# Patient Record
Sex: MALE | Race: BLACK OR AFRICAN AMERICAN | NOT HISPANIC OR LATINO | Employment: FULL TIME | URBAN - METROPOLITAN AREA
[De-identification: names, ages, dates, MRNs, and addresses within clinical notes are randomized per-mention and may not be internally consistent; named-entity substitution may affect disease eponyms.]

---

## 2018-04-23 ENCOUNTER — HOSPITAL ENCOUNTER (EMERGENCY)
Facility: HOSPITAL | Age: 23
Discharge: HOME | End: 2018-04-23
Attending: EMERGENCY MEDICINE
Payer: COMMERCIAL

## 2018-04-23 VITALS
BODY MASS INDEX: 28.77 KG/M2 | HEART RATE: 65 BPM | WEIGHT: 201 LBS | OXYGEN SATURATION: 96 % | SYSTOLIC BLOOD PRESSURE: 126 MMHG | DIASTOLIC BLOOD PRESSURE: 72 MMHG | RESPIRATION RATE: 20 BRPM | TEMPERATURE: 98 F | HEIGHT: 70 IN

## 2018-04-23 DIAGNOSIS — J02.9 PHARYNGITIS, UNSPECIFIED ETIOLOGY: ICD-10-CM

## 2018-04-23 DIAGNOSIS — R09.81 SINUS CONGESTION: Primary | ICD-10-CM

## 2018-04-23 LAB — DEPRECATED S PYO AG THROAT QL EIA: NEGATIVE

## 2018-04-23 PROCEDURE — 99284 EMERGENCY DEPT VISIT MOD MDM: CPT | Mod: 25

## 2018-04-23 PROCEDURE — 87880 STREP A ASSAY W/OPTIC: CPT

## 2018-04-23 PROCEDURE — 63600175 PHARM REV CODE 636 W HCPCS: Performed by: PHYSICIAN ASSISTANT

## 2018-04-23 PROCEDURE — 87081 CULTURE SCREEN ONLY: CPT

## 2018-04-23 PROCEDURE — 25000003 PHARM REV CODE 250: Performed by: PHYSICIAN ASSISTANT

## 2018-04-23 PROCEDURE — 96372 THER/PROPH/DIAG INJ SC/IM: CPT

## 2018-04-23 RX ORDER — KETOROLAC TROMETHAMINE 30 MG/ML
15 INJECTION, SOLUTION INTRAMUSCULAR; INTRAVENOUS
Status: COMPLETED | OUTPATIENT
Start: 2018-04-23 | End: 2018-04-23

## 2018-04-23 RX ORDER — IBUPROFEN 600 MG/1
600 TABLET ORAL EVERY 6 HOURS PRN
Qty: 20 TABLET | Refills: 0 | Status: SHIPPED | OUTPATIENT
Start: 2018-04-23 | End: 2019-08-26 | Stop reason: ALTCHOICE

## 2018-04-23 RX ORDER — BENZONATATE 100 MG/1
100 CAPSULE ORAL 3 TIMES DAILY PRN
Qty: 20 CAPSULE | Refills: 0 | Status: SHIPPED | OUTPATIENT
Start: 2018-04-23 | End: 2018-05-03

## 2018-04-23 RX ORDER — ACETAMINOPHEN 325 MG/1
325 TABLET ORAL EVERY 6 HOURS PRN
COMMUNITY
End: 2023-02-09

## 2018-04-23 RX ORDER — FLUTICASONE PROPIONATE 50 MCG
1 SPRAY, SUSPENSION (ML) NASAL 2 TIMES DAILY PRN
Qty: 15 G | Refills: 0 | Status: SHIPPED | OUTPATIENT
Start: 2018-04-23 | End: 2022-03-21

## 2018-04-23 RX ORDER — BENZONATATE 100 MG/1
100 CAPSULE ORAL
Status: COMPLETED | OUTPATIENT
Start: 2018-04-23 | End: 2018-04-23

## 2018-04-23 RX ADMIN — BENZONATATE 100 MG: 100 CAPSULE ORAL at 06:04

## 2018-04-23 RX ADMIN — KETOROLAC TROMETHAMINE 15 MG: 30 INJECTION, SOLUTION INTRAMUSCULAR at 06:04

## 2018-04-25 LAB — BACTERIA THROAT CULT: NORMAL

## 2018-12-07 ENCOUNTER — HOSPITAL ENCOUNTER (EMERGENCY)
Facility: HOSPITAL | Age: 23
Discharge: HOME | End: 2018-12-07
Attending: EMERGENCY MEDICINE
Payer: COMMERCIAL

## 2018-12-07 VITALS
DIASTOLIC BLOOD PRESSURE: 96 MMHG | BODY MASS INDEX: 29.4 KG/M2 | HEART RATE: 68 BPM | SYSTOLIC BLOOD PRESSURE: 153 MMHG | HEIGHT: 71 IN | RESPIRATION RATE: 20 BRPM | OXYGEN SATURATION: 97 % | WEIGHT: 210 LBS | TEMPERATURE: 98 F

## 2018-12-07 DIAGNOSIS — R09.89 ABNORMAL LUNG SOUNDS: ICD-10-CM

## 2018-12-07 DIAGNOSIS — J06.9 URI WITH COUGH AND CONGESTION: Primary | ICD-10-CM

## 2018-12-07 DIAGNOSIS — S02.5XXA CLOSED FRACTURE OF TOOTH, INITIAL ENCOUNTER: ICD-10-CM

## 2018-12-07 LAB
FLUAV AG SPEC QL IA: NEGATIVE
FLUBV AG SPEC QL IA: NEGATIVE
SPECIMEN SOURCE: NORMAL

## 2018-12-07 PROCEDURE — 25000003 PHARM REV CODE 250: Performed by: NURSE PRACTITIONER

## 2018-12-07 PROCEDURE — 25000242 PHARM REV CODE 250 ALT 637 W/ HCPCS: Performed by: NURSE PRACTITIONER

## 2018-12-07 PROCEDURE — 94760 N-INVAS EAR/PLS OXIMETRY 1: CPT

## 2018-12-07 PROCEDURE — 94640 AIRWAY INHALATION TREATMENT: CPT

## 2018-12-07 PROCEDURE — 99284 EMERGENCY DEPT VISIT MOD MDM: CPT | Mod: 25

## 2018-12-07 PROCEDURE — 87400 INFLUENZA A/B EACH AG IA: CPT

## 2018-12-07 RX ORDER — PENICILLIN V POTASSIUM 250 MG/1
250 TABLET, FILM COATED ORAL EVERY 6 HOURS
Qty: 28 TABLET | Refills: 0 | Status: SHIPPED | OUTPATIENT
Start: 2018-12-07 | End: 2018-12-14

## 2018-12-07 RX ORDER — PENICILLIN V POTASSIUM 250 MG/1
250 TABLET, FILM COATED ORAL
Status: COMPLETED | OUTPATIENT
Start: 2018-12-07 | End: 2018-12-07

## 2018-12-07 RX ORDER — ALBUTEROL SULFATE 90 UG/1
1-2 AEROSOL, METERED RESPIRATORY (INHALATION) EVERY 6 HOURS PRN
Qty: 1 INHALER | Refills: 0 | Status: SHIPPED | OUTPATIENT
Start: 2018-12-07 | End: 2022-03-21 | Stop reason: ALTCHOICE

## 2018-12-07 RX ORDER — SULINDAC 150 MG/1
150 TABLET ORAL 2 TIMES DAILY
Qty: 10 TABLET | Refills: 0 | Status: SHIPPED | OUTPATIENT
Start: 2018-12-07 | End: 2022-03-21

## 2018-12-07 RX ORDER — NAPROXEN 250 MG/1
250 TABLET ORAL
Status: COMPLETED | OUTPATIENT
Start: 2018-12-07 | End: 2018-12-07

## 2018-12-07 RX ORDER — ALBUTEROL SULFATE 2.5 MG/.5ML
2.5 SOLUTION RESPIRATORY (INHALATION)
Status: COMPLETED | OUTPATIENT
Start: 2018-12-07 | End: 2018-12-07

## 2018-12-07 RX ADMIN — NAPROXEN 250 MG: 250 TABLET ORAL at 05:12

## 2018-12-07 RX ADMIN — PENICILLIN V POTASIUM 250 MG: 250 TABLET ORAL at 05:12

## 2018-12-07 RX ADMIN — ALBUTEROL SULFATE 2.5 MG: 2.5 SOLUTION RESPIRATORY (INHALATION) at 06:12

## 2018-12-07 NOTE — ED TRIAGE NOTES
Patient reports body aches, runny nose, headache, sore throat, cough with yellow sputum, fever, vomiting x 2 days. Also reports right lower dental pain. Reports taking Ibuprofen and Theraflu for the symptoms with minimal relief.

## 2018-12-08 NOTE — ED PROVIDER NOTES
Encounter Date: 12/7/2018       History     Chief Complaint   Patient presents with    Generalized Body Aches     body aches, runny nose, headache, sore throat, cough - yellow, fever, and vomiting x 2 days.    Dental Pain     right bottom cracked tooth x 3 days.     Chief complaint:  Myalgias, dental pain    History of present illness:  Patient is a 23-year-old male who presents with 2 days of upper respiratory symptoms as well as a fractured right molar.  He reports he has tried there fluid ibuprofen without effectiveness.  Currently reports 7/10 pain scale.  Reports tooth was fracture 3 days ago while eating.  Reports the distant history of asthma.      The history is provided by the patient and medical records. No  was used.     Review of patient's allergies indicates:  No Known Allergies  History reviewed. No pertinent past medical history.  Past Surgical History:   Procedure Laterality Date    ACHILLES TENDON SURGERY       Family History   Family history unknown: Yes     Social History     Tobacco Use    Smoking status: Current Some Day Smoker    Smokeless tobacco: Never Used    Tobacco comment: Cigar   Substance Use Topics    Alcohol use: Yes     Comment: occasionally    Drug use: No     Review of Systems   Constitutional: Negative for appetite change, chills, diaphoresis, fatigue and fever.   HENT: Positive for dental problem, rhinorrhea and sore throat. Negative for congestion, ear discharge, ear pain, postnasal drip, sinus pressure, sneezing and voice change.    Eyes: Negative for discharge, itching and visual disturbance.   Respiratory: Positive for cough. Negative for shortness of breath and wheezing.    Cardiovascular: Negative for chest pain, palpitations and leg swelling.   Gastrointestinal: Positive for nausea and vomiting. Negative for abdominal pain.   Endocrine: Negative for polydipsia, polyphagia and polyuria.   Genitourinary: Negative for difficulty urinating,  discharge, dysuria, frequency, hematuria, penile pain, penile swelling and urgency.   Musculoskeletal: Positive for myalgias. Negative for arthralgias.   Skin: Negative for rash and wound.   Neurological: Positive for headaches. Negative for dizziness, seizures, syncope and weakness.   Hematological: Negative for adenopathy. Does not bruise/bleed easily.   Psychiatric/Behavioral: Negative for agitation and self-injury. The patient is not nervous/anxious.        Physical Exam     Initial Vitals [12/07/18 1715]   BP Pulse Resp Temp SpO2   (!) 140/90 76 18 98.4 °F (36.9 °C) 97 %      MAP       --         Physical Exam    Nursing note and vitals reviewed.  Constitutional: He appears well-developed and well-nourished. He is not diaphoretic. No distress. He is not intubated.   HENT:   Head: Normocephalic and atraumatic.   Right Ear: External ear normal.   Left Ear: External ear normal.   Nose: Nose normal. No epistaxis.   Mouth/Throat: Oropharynx is clear and moist. Abnormal dentition.       Eyes: Pupils are equal, round, and reactive to light. Right eye exhibits no discharge. Left eye exhibits no discharge. No scleral icterus.   Neck: Normal range of motion.   Cardiovascular: Regular rhythm, S1 normal, S2 normal and normal heart sounds. Exam reveals no gallop.    No murmur heard.  Pulmonary/Chest: Effort normal and breath sounds normal. No accessory muscle usage. No apnea, no tachypnea and no bradypnea. He is not intubated. No respiratory distress. He has no decreased breath sounds. He has no wheezes. He has no rhonchi. He has no rales.   Abdominal: He exhibits no distension.   Musculoskeletal: Normal range of motion.   Neurological: He is alert and oriented to person, place, and time.   Skin: Skin is dry. Capillary refill takes less than 2 seconds.         ED Course   Procedures  Labs Reviewed   INFLUENZA A AND B ANTIGEN          Imaging Results          X-Ray Chest PA And Lateral (Final result)  Result time 12/07/18  18:02:20    Final result by Aria Bouderaux MD (12/07/18 18:02:20)                 Impression:      No acute intrathoracic process identified.      Electronically signed by: Aria Boudreaux MD  Date:    12/07/2018  Time:    18:02             Narrative:    EXAMINATION:  XR CHEST PA AND LATERAL    CLINICAL HISTORY:  Other specified symptoms and signs involving the circulatory and respiratory systems    TECHNIQUE:  PA and lateral views of the chest were performed.    COMPARISON:  None    FINDINGS:  Cardiac silhouette is normal in size.  Lungs are symmetrically expanded.  No evidence of focal consolidative process, pneumothorax, or significant effusion.  No acute osseous abnormality identified.                                       APC / Resident Notes:   Initial assessment:  23-year-old male with dental pain secondary to a dental fracture as well as upper respiratory infection symptoms.    Differential diagnosis includes asthma exacerbation, influenza, URI, dental fracture    I will give Pen-VK both now on discharge for dental fracture as well as an anti-inflammatory such as Clinoril 150 mg p.o. b.i.d. after reviewing renal status allergy listed medications.  Patient was given 2.5 mg of albuterol inhaled.  Naproxen 250 mg given in the emergency department for relief of myalgias.    Chest x-ray indicated no abnormalities.    Patient was discharged home on Pen-VK for dental infection and told to follow up with his dentist as soon as possible.  He is also prescribed an albuterol MDI for use every 4-6 hours p.r.n. wheezing; Clinoril 100 mg p.o. b.i.d. for relief of myalgias and body aches.  He is recommended to use Flonase for rhinorrhea, Cepacol for sore throat, Delsym for cough.  Follow up with primary care provider for upper respiratory infection symptoms.    Return to the emergency department for any worsening or changes in condition.              ED Course as of Dec 07 1857   Fri Dec 07, 2018   1811   No acute  intrathoracic process identified. X-Ray Chest PA And Lateral [VC]   1812 BP: (!) 140/90 [VC]   1812 Temp: 98.4 °F (36.9 °C) [VC]   1812 Pulse: 76 [VC]   1812 Resp: 18 [VC]   1812 SpO2: 97 % [VC]   1817 Influenza A Ag, EIA: Negative [VC]   1817 Influenza B Ag, EIA: Negative [VC]      ED Course User Index  [VC] Sanju Guthrie DNP     Clinical Impression:   The primary encounter diagnosis was URI with cough and congestion. Diagnoses of Abnormal lung sounds and Closed fracture of tooth, initial encounter were also pertinent to this visit.      Disposition:   Disposition: Discharged  Condition: Stable                        Sanju Guthrie DNP  12/07/18 1901

## 2018-12-08 NOTE — DISCHARGE INSTRUCTIONS
Flonase for congestion and runny nose. Cepacol lozenges, warm fluids to drink, and warm salt water gargles for sore throat. Delsym over the counter for cough. Return to the Emergency department for any worsening or failure to improve, otherwise follow up with your primary care provider.

## 2019-03-01 ENCOUNTER — HOSPITAL ENCOUNTER (EMERGENCY)
Facility: HOSPITAL | Age: 24
Discharge: HOME | End: 2019-03-01
Attending: EMERGENCY MEDICINE
Payer: COMMERCIAL

## 2019-03-01 VITALS
TEMPERATURE: 99 F | HEIGHT: 71 IN | SYSTOLIC BLOOD PRESSURE: 185 MMHG | OXYGEN SATURATION: 100 % | HEART RATE: 62 BPM | BODY MASS INDEX: 29.4 KG/M2 | WEIGHT: 210 LBS | DIASTOLIC BLOOD PRESSURE: 104 MMHG | RESPIRATION RATE: 18 BRPM

## 2019-03-01 DIAGNOSIS — K04.01 ACUTE PULPITIS: ICD-10-CM

## 2019-03-01 DIAGNOSIS — K08.89 PAIN, DENTAL: Primary | ICD-10-CM

## 2019-03-01 PROCEDURE — 25000003 PHARM REV CODE 250: Performed by: NURSE PRACTITIONER

## 2019-03-01 PROCEDURE — 99283 EMERGENCY DEPT VISIT LOW MDM: CPT

## 2019-03-01 RX ORDER — HYDROCODONE BITARTRATE AND ACETAMINOPHEN 5; 325 MG/1; MG/1
1 TABLET ORAL
Status: COMPLETED | OUTPATIENT
Start: 2019-03-01 | End: 2019-03-01

## 2019-03-01 RX ORDER — PENICILLIN V POTASSIUM 250 MG/1
500 TABLET, FILM COATED ORAL
Status: COMPLETED | OUTPATIENT
Start: 2019-03-01 | End: 2019-03-01

## 2019-03-01 RX ORDER — PENICILLIN V POTASSIUM 500 MG/1
500 TABLET, FILM COATED ORAL 4 TIMES DAILY
Qty: 40 TABLET | Refills: 0 | Status: SHIPPED | OUTPATIENT
Start: 2019-03-01 | End: 2019-03-11

## 2019-03-01 RX ORDER — ONDANSETRON 4 MG/1
4 TABLET, ORALLY DISINTEGRATING ORAL
Status: COMPLETED | OUTPATIENT
Start: 2019-03-01 | End: 2019-03-01

## 2019-03-01 RX ORDER — HYDROCODONE BITARTRATE AND ACETAMINOPHEN 5; 325 MG/1; MG/1
1 TABLET ORAL EVERY 6 HOURS PRN
Qty: 10 TABLET | Refills: 0 | Status: SHIPPED | OUTPATIENT
Start: 2019-03-01 | End: 2019-03-11

## 2019-03-01 RX ADMIN — HYDROCODONE BITARTRATE AND ACETAMINOPHEN 1 TABLET: 5; 325 TABLET ORAL at 03:03

## 2019-03-01 RX ADMIN — ONDANSETRON 4 MG: 4 TABLET, ORALLY DISINTEGRATING ORAL at 03:03

## 2019-03-01 RX ADMIN — PENICILLIN V POTASIUM 500 MG: 250 TABLET ORAL at 03:03

## 2019-03-01 NOTE — ED TRIAGE NOTES
Arrived via personal transportation. Right bottom dental pain x 2 months.Pt states it really started hurting bad yesterday. Pt denies making dentist appointment

## 2019-03-01 NOTE — ED PROVIDER NOTES
Encounter Date: 3/1/2019       History     Chief Complaint   Patient presents with    Dental Pain     Pt c/o R lower jaw tooth pain off and on for a couple months. Pain worse since yesterday.      This is a 24 y/o male with no significant medical history that comes to the ER c/o dental pain that started yesterday. Pt reports a cracked tooth for approximately 1 month with intermittent pain. Pt states pain became severe and constant last night with associated swelling in the area surrounding the tooth. He denies fever, bodyaches, vomiting, difficulty swallowing/choking.  He has attempted treatment with ibuprofen and placing a temporary cap/filling over the tooth without improvement.  No other modifying factors.          Review of patient's allergies indicates:  No Known Allergies  History reviewed. No pertinent past medical history.  Past Surgical History:   Procedure Laterality Date    ACHILLES TENDON SURGERY       Family History   Family history unknown: Yes     Social History     Tobacco Use    Smoking status: Current Some Day Smoker    Smokeless tobacco: Never Used    Tobacco comment: Cigar   Substance Use Topics    Alcohol use: Yes     Comment: occasionally    Drug use: No     Review of Systems   Constitutional: Negative for chills and fever.   HENT: Positive for dental problem and ear pain. Negative for sinus pain, sore throat and trouble swallowing.    Respiratory: Negative for choking.    Gastrointestinal: Negative for nausea and vomiting.   Musculoskeletal: Negative for back pain.   Neurological: Negative for weakness and headaches.       Physical Exam     Initial Vitals [03/01/19 1544]   BP Pulse Resp Temp SpO2   (!) 185/104 62 18 98.8 °F (37.1 °C) 100 %      MAP       --         Physical Exam    Nursing note and vitals reviewed.  Constitutional: He appears well-developed and well-nourished. He is not diaphoretic. No distress.   HENT:   Mouth/Throat: Uvula is midline, oropharynx is clear and moist and  mucous membranes are normal. No trismus in the jaw. No uvula swelling.       Cracked molar #31 with surrounding gingival swelling, erythema, tenderness. No sublingual/facial swelling, fluctuant abscess, trismus, drooling.   Eyes: Pupils are equal, round, and reactive to light.   Neck: Normal range of motion.   Pulmonary/Chest: No respiratory distress.   Neurological: He is alert and oriented to person, place, and time. No sensory deficit.   Skin: Skin is warm and dry.   Psychiatric: He has a normal mood and affect.         ED Course   Procedures  Labs Reviewed - No data to display       Imaging Results    None             Additional MDM:   Comments: This is an urgent evaluation of a 22 y/o male that presents to the ED with dental pain.  He appears to have pulpitis; most likely a periapical abscess/dental infection.  Pt is afebrile, non-toxic in appearance; no signs of Aiden's angina, airway compromise, facial cellulitis/swelling, sepsis, dehydration, or a fluctuant abscess to drain.  Will discharge with Pen VK and pain control.  Given return precautions and instructed to follow up with a dentist within a week.  .                    Clinical Impression:       ICD-10-CM ICD-9-CM   1. Pain, dental K08.89 525.9   2. Acute pulpitis K04.01 522.0         Disposition:   Disposition: Discharged  Condition: Stable                        Chelsea Gorman NP  03/01/19 4469

## 2019-03-01 NOTE — DISCHARGE INSTRUCTIONS
It appears you have a dental infection.    Please take PENICILLIN for the infection for 10 days.    Take IBUPROFEN for pain/inflammation.  If you still have pain, take NORCO for breakthrough pain.  Please do not take Norco while working, drinking alcohol, driving/operating heavy machinery, swimming. It may cause drowsiness, impair judgment, and reduce physical capabilities.    Please follow up with a dentist within 1 week. Call for an appointment as soon as possible.  If you do not have a dentist, refer to the dental resources page for nearby clinics.    Return to the ER for any new or worsening symptoms or concerns.

## 2019-08-22 ENCOUNTER — OCCUPATIONAL HEALTH (OUTPATIENT)
Dept: URGENT CARE | Facility: CLINIC | Age: 24
End: 2019-08-22

## 2019-08-22 PROCEDURE — 80305 PR COLLECTION ONLY DRUG SCREEN: ICD-10-PCS | Mod: S$GLB,,, | Performed by: NURSE PRACTITIONER

## 2019-08-22 PROCEDURE — 80305 DRUG TEST PRSMV DIR OPT OBS: CPT | Mod: S$GLB,,, | Performed by: NURSE PRACTITIONER

## 2019-08-26 ENCOUNTER — HOSPITAL ENCOUNTER (EMERGENCY)
Facility: HOSPITAL | Age: 24
Discharge: HOME | End: 2019-08-26
Attending: EMERGENCY MEDICINE
Payer: COMMERCIAL

## 2019-08-26 VITALS
BODY MASS INDEX: 32.2 KG/M2 | WEIGHT: 230 LBS | OXYGEN SATURATION: 97 % | TEMPERATURE: 98 F | SYSTOLIC BLOOD PRESSURE: 151 MMHG | HEART RATE: 66 BPM | DIASTOLIC BLOOD PRESSURE: 104 MMHG | HEIGHT: 71 IN | RESPIRATION RATE: 18 BRPM

## 2019-08-26 DIAGNOSIS — S39.012A BACK STRAIN, INITIAL ENCOUNTER: ICD-10-CM

## 2019-08-26 DIAGNOSIS — M54.50 ACUTE BILATERAL LOW BACK PAIN WITHOUT SCIATICA: Primary | ICD-10-CM

## 2019-08-26 DIAGNOSIS — M53.3 COCCYX PAIN: ICD-10-CM
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
## 2019-08-26 PROCEDURE — 99284 EMERGENCY DEPT VISIT MOD MDM: CPT | Mod: 25

## 2019-08-26 PROCEDURE — 99283 EMERGENCY DEPT VISIT LOW MDM: CPT | Mod: 25

## 2019-08-26 PROCEDURE — 25000003 PHARM REV CODE 250: Performed by: NURSE PRACTITIONER

## 2019-08-26 RX ORDER — METHOCARBAMOL 500 MG/1
1000 TABLET, FILM COATED ORAL 3 TIMES DAILY PRN
Qty: 18 TABLET | Refills: 0 | Status: SHIPPED | OUTPATIENT
Start: 2019-08-26 | End: 2022-03-21

## 2019-08-26 RX ORDER — NAPROXEN 500 MG/1
500 TABLET ORAL
Status: COMPLETED | OUTPATIENT
Start: 2019-08-26 | End: 2019-08-26

## 2019-08-26 RX ORDER — METHOCARBAMOL 500 MG/1
1000 TABLET, FILM COATED ORAL
Status: COMPLETED | OUTPATIENT
Start: 2019-08-26 | End: 2019-08-26

## 2019-08-26 RX ORDER — NAPROXEN 500 MG/1
500 TABLET ORAL 2 TIMES DAILY PRN
Qty: 10 TABLET | Refills: 0 | Status: SHIPPED | OUTPATIENT
Start: 2019-08-26 | End: 2019-08-31

## 2019-08-26 RX ADMIN — NAPROXEN 500 MG: 500 TABLET ORAL at 07:08

## 2019-08-26 RX ADMIN — METHOCARBAMOL 1000 MG: 500 TABLET, FILM COATED ORAL at 07:08

## 2019-08-26 NOTE — ED TRIAGE NOTES
Patient reports reports falling at work approximately 2 hours ago and injuring back and buttocks.  Patient denies hitting head or LOC.  Patient reports lower back pain and tingling to left buttocks.

## 2019-08-26 NOTE — ED NOTES
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 Supervisor doesn't need any more information will receive papers from the patient.

## 2019-08-27 NOTE — ED PROVIDER NOTES
Encounter Date: 8/26/2019       History     Chief Complaint   Patient presents with    Fall     fall at work complaints of lower back pain.  moreso on left side.     CC: Back and Buttock pain following fall    HPI: Chester Red Jr., a 24 y.o. male presents to the ED with an acute onset of lower back pain (L>R) and pain over the tailbone after a slip and fall that occurred at work today. He reports he was walking out of the locker room at work and had a slip and fall in a puddle of water. Reports no head injury or loss of consciousness. No medications or treatments attempted prior to arrival.    The history is provided by the patient. No  was used.     Review of patient's allergies indicates:  No Known Allergies  Past Medical History:   Diagnosis Date    Hypertension      Past Surgical History:   Procedure Laterality Date    ACHILLES TENDON SURGERY       Family History   Family history unknown: Yes     Social History     Tobacco Use    Smoking status: Current Some Day Smoker    Smokeless tobacco: Never Used    Tobacco comment: Cigar   Substance Use Topics    Alcohol use: Yes     Comment: occasionally    Drug use: No     Review of Systems   Constitutional: Negative for fever.   HENT: Negative for sore throat and trouble swallowing.         (-) Head Injury   Gastrointestinal: Negative for abdominal pain and vomiting.   Musculoskeletal: Positive for back pain. Negative for neck pain.        (+) Tailbone Pain     Skin: Negative for color change, pallor, rash and wound.   Neurological: Negative for syncope and headaches.   Psychiatric/Behavioral: Negative for confusion.       Physical Exam     Initial Vitals [08/26/19 1756]   BP Pulse Resp Temp SpO2   131/77 73 16 98.1 °F (36.7 °C) 99 %      MAP       --         Physical Exam    Nursing note and vitals reviewed.  Constitutional: He appears well-developed and well-nourished. He is not diaphoretic. He is cooperative.  Non-toxic appearance. He  does not have a sickly appearance. He does not appear ill. No distress.   HENT:   Head: Normocephalic and atraumatic.   Right Ear: Tympanic membrane and external ear normal. No hemotympanum.   Left Ear: Tympanic membrane and external ear normal. No hemotympanum.   Nose: No mucosal edema or rhinorrhea.   Mouth/Throat: Uvula is midline, oropharynx is clear and moist and mucous membranes are normal. No trismus in the jaw.   Eyes: Conjunctivae and EOM are normal.   Neck: Trachea normal, normal range of motion and phonation normal. Neck supple. No spinous process tenderness and no muscular tenderness present. Normal range of motion present. No neck rigidity.   Cardiovascular: Normal rate and regular rhythm.   Pulmonary/Chest: Effort normal. No tachypnea and no bradypnea. No respiratory distress. He has no wheezes. He has no rhonchi. He has no rales. He exhibits no tenderness and no bony tenderness.   Abdominal: He exhibits no distension. There is no tenderness. There is no rebound.   Musculoskeletal: Normal range of motion. He exhibits tenderness.        Right hip: Normal.        Left hip: Normal.        Cervical back: He exhibits no tenderness, no bony tenderness, no deformity and no pain.        Thoracic back: He exhibits no tenderness, no bony tenderness, no deformity and no pain.        Lumbar back: He exhibits tenderness (R>L), bony tenderness and pain.   TTP over the upper sacrum. No crepitus or bony deformity.    Neurological: He is alert and oriented to person, place, and time. He has normal strength. No sensory deficit. Coordination and gait normal. GCS score is 15. GCS eye subscore is 4. GCS verbal subscore is 5. GCS motor subscore is 6.   Skin: Skin is warm, dry and intact. Capillary refill takes less than 2 seconds. No bruising and no rash noted. No erythema.   Psychiatric: He has a normal mood and affect. His behavior is normal. Judgment and thought content normal.         ED Course   Procedures  Labs  Reviewed - No data to display       Imaging Results          X-Ray Lumbar Spine Ap And Lateral (Final result)  Result time 08/26/19 21:14:13    Final result by Enmanuel Beaulieu MD (08/26/19 21:14:13)                 Impression:      No acute findings in the lumbar spine      Electronically signed by: Enmanuel Beaulieu MD  Date:    08/26/2019  Time:    21:14             Narrative:    EXAMINATION:  XR LUMBAR SPINE AP AND LATERAL    CLINICAL HISTORY:  Low back pain, minor trauma;    TECHNIQUE:  AP, lateral and spot images were performed of the lumbar spine.    COMPARISON:  None    FINDINGS:  Lumbar spine has normal alignment.  Body heights and disc space heights are maintained.  No compression fracture or bone destruction.  There is a transitional S1 segment.                               X-Ray Sacrum And Coccyx (Final result)  Result time 08/26/19 21:24:53    Final result by Porfirio Mercado MD (08/26/19 21:24:53)                 Impression:      No acute process.      Electronically signed by: Porfirio Mercado MD  Date:    08/26/2019  Time:    21:24             Narrative:    EXAMINATION:  XR SACRUM AND COCCYX    CLINICAL HISTORY:  Unspecified fall, initial encounter    TECHNIQUE:  Two or three views of the sacrum and coccyx were performed.    COMPARISON:  None    FINDINGS:  The bone mineralization is within normal limits.  No cortical step-off is identified.  The alignment of the sacrum and coccyx is unremarkable.  The sacroiliac joints are within normal limits.  The neural arches are intact.  There are phleboliths in the pelvis.  The soft tissues are unremarkable.  There is no evidence of a fracture or dislocation.                                       APC / Resident Notes:   This is an evaluation of a 24 y.o. male that presents to the Emergency Department for back pain and sacral pain following a slip and fall. Physical Exam shows a non-toxic, afebrile, and well appearing male.  Palpation of the generalized lower lumbar  muscular back, left greater than right.  There is some midline vertebral tenderness without crepitus, step-off, or bony deformity noted.  There is tenderness over the proximal coccyx without crepitus noted. No bruising over the buttocks, sacrum, or lower back.  No external evidence of head injury or trauma. Vital signs are reassuring. If available, previous records reviewed. RESULTS:  X-ray lumbar spine without evidence of acute fracture or subluxation.    My overall impression is slip and fall at work with back pain/strain and coccyx pain. I considered, but at this time, do not suspect acute displaced bony fracture, subluxation, closed head injury, or intra-abdominal injury.    ED Course:  Naproxen and Robaxin. D/C Meds:  Naproxen and Robaxin. D/C Information:  I/she does needed, follow up with occupational health.  The diagnosis, treatment plan, instructions for follow-up and reevaluation with occupational health tomorrow for work clearance as well as ED return precautions were discussed and understanding was verbalized. All questions or concerns have been addressed.  KARLEE Pena, DENNISP-C                 Clinical Impression:       ICD-10-CM ICD-9-CM   1. Acute bilateral low back pain without sciatica M54.5 724.2     338.19   2. Fall W19.XXXA E888.9   3. Accident at workplace Y99.0 E928.9   4. Back strain, initial encounter S39.012A 847.9   5. Coccyx pain M53.3 724.79         Disposition:   Disposition: Discharged  Condition: Stable                        BRET Klein  08/26/19 2860

## 2019-08-27 NOTE — DISCHARGE INSTRUCTIONS
Please return to the Emergency Department for any new or worsening symptoms including: worsening or changes in your pain, fever, chest pain, shortness of breath, loss of consciousness, dizziness, weakness, or any other concerns.     Please follow up with Occupational Medicine(Health) in 1 day for a recheck of your symptoms.     Please take all medication as prescribed. You have been prescribed Robaxin (Methocarbamol) for pain. Please do not take this medication while working, drinking alcohol, swimming, or while driving/operating heavy machinery. This medication may cause drowsiness, dizziness, impair judgment, and reduce physical capabilities.You should not drive, operate heavy machinery, or make life changing decisions while taking this medication.      You have been prescribed Naproxen for pain. This is an Non-Steroidal Anti-Inflammatory (NSAID) Medication. Please do not take any additional NSAIDs while you are taking this medication including (Advil, Aleve, Motrin, Ibuprofen, Mobic\meloxicam, Naprosyn, Toradol, ketoralac, etc.). Please stop taking this medication if you experience: weakness, itching, yellow skin or eyes, joint pains, vomiting blood, blood or black stools, unusual weight gain, or swelling in your arms, legs, hands, or feet.

## 2019-08-29 ENCOUNTER — TELEPHONE (OUTPATIENT)
Dept: URGENT CARE | Facility: CLINIC | Age: 24
End: 2019-08-29

## 2019-09-04 ENCOUNTER — OCCUPATIONAL HEALTH (OUTPATIENT)
Dept: URGENT CARE | Facility: CLINIC | Age: 24
End: 2019-09-04

## 2019-09-04 ENCOUNTER — TELEPHONE (OUTPATIENT)
Dept: URGENT CARE | Facility: CLINIC | Age: 24
End: 2019-09-04

## 2019-09-04 DIAGNOSIS — Z02.1 PHYSICAL EXAM, PRE-EMPLOYMENT: Primary | ICD-10-CM

## 2019-09-04 PROCEDURE — 99080 OSHA QUESTIONNAIRE: ICD-10-PCS | Mod: S$GLB,,, | Performed by: PHYSICIAN ASSISTANT

## 2019-09-04 PROCEDURE — 94010 PULMONARY FUNCTION SCREENING (OCC MED_PHYSICALS): ICD-10-PCS | Mod: S$GLB,,, | Performed by: PHYSICIAN ASSISTANT

## 2019-09-04 PROCEDURE — 99080 SPECIAL REPORTS OR FORMS: CPT | Mod: S$GLB,,, | Performed by: PHYSICIAN ASSISTANT

## 2019-09-04 PROCEDURE — 94010 BREATHING CAPACITY TEST: CPT | Mod: S$GLB,,, | Performed by: PHYSICIAN ASSISTANT

## 2022-02-16 PROCEDURE — 99284 EMERGENCY DEPT VISIT MOD MDM: CPT

## 2022-02-17 ENCOUNTER — HOSPITAL ENCOUNTER (EMERGENCY)
Facility: HOSPITAL | Age: 27
Discharge: HOME | End: 2022-02-17
Attending: EMERGENCY MEDICINE
Payer: COMMERCIAL

## 2022-02-17 VITALS
OXYGEN SATURATION: 100 % | BODY MASS INDEX: 34.87 KG/M2 | RESPIRATION RATE: 17 BRPM | DIASTOLIC BLOOD PRESSURE: 86 MMHG | SYSTOLIC BLOOD PRESSURE: 135 MMHG | HEART RATE: 68 BPM | TEMPERATURE: 99 F | WEIGHT: 250 LBS

## 2022-02-17 DIAGNOSIS — U07.1 COVID-19 VIRUS INFECTION: Primary | ICD-10-CM

## 2022-02-17 DIAGNOSIS — R42 DIZZINESS: ICD-10-CM

## 2022-02-17 DIAGNOSIS — U07.1 COVID-19 VIRUS DETECTED: ICD-10-CM

## 2022-02-17 DIAGNOSIS — T50.905A MEDICATION SIDE EFFECT, INITIAL ENCOUNTER: ICD-10-CM

## 2022-02-17 LAB
ALBUMIN SERPL BCP-MCNC: 4.3 G/DL (ref 3.5–5.2)
ALP SERPL-CCNC: 48 U/L (ref 55–135)
ALT SERPL W/O P-5'-P-CCNC: 35 U/L (ref 10–44)
ANION GAP SERPL CALC-SCNC: 10 MMOL/L (ref 8–16)
AST SERPL-CCNC: 38 U/L (ref 10–40)
BASOPHILS # BLD AUTO: 0.03 K/UL (ref 0–0.2)
BASOPHILS NFR BLD: 1 % (ref 0–1.9)
BILIRUB SERPL-MCNC: 0.9 MG/DL (ref 0.1–1)
BNP SERPL-MCNC: 18 PG/ML (ref 0–99)
BUN SERPL-MCNC: 13 MG/DL (ref 6–20)
CALCIUM SERPL-MCNC: 9.1 MG/DL (ref 8.7–10.5)
CHLORIDE SERPL-SCNC: 101 MMOL/L (ref 95–110)
CO2 SERPL-SCNC: 26 MMOL/L (ref 23–29)
CREAT SERPL-MCNC: 1.2 MG/DL (ref 0.5–1.4)
DIFFERENTIAL METHOD: ABNORMAL
EOSINOPHIL # BLD AUTO: 0.3 K/UL (ref 0–0.5)
EOSINOPHIL NFR BLD: 11.4 % (ref 0–8)
ERYTHROCYTE [DISTWIDTH] IN BLOOD BY AUTOMATED COUNT: 13.8 % (ref 11.5–14.5)
EST. GFR  (AFRICAN AMERICAN): >60 ML/MIN/1.73 M^2
EST. GFR  (NON AFRICAN AMERICAN): >60 ML/MIN/1.73 M^2
GLUCOSE SERPL-MCNC: 116 MG/DL (ref 70–110)
HCT VFR BLD AUTO: 43.1 % (ref 40–54)
HGB BLD-MCNC: 14.3 G/DL (ref 14–18)
IMM GRANULOCYTES # BLD AUTO: 0.01 K/UL (ref 0–0.04)
IMM GRANULOCYTES NFR BLD AUTO: 0.3 % (ref 0–0.5)
LYMPHOCYTES # BLD AUTO: 1.1 K/UL (ref 1–4.8)
LYMPHOCYTES NFR BLD: 39.3 % (ref 18–48)
MCH RBC QN AUTO: 26.7 PG (ref 27–31)
MCHC RBC AUTO-ENTMCNC: 33.2 G/DL (ref 32–36)
MCV RBC AUTO: 81 FL (ref 82–98)
MONOCYTES # BLD AUTO: 0.4 K/UL (ref 0.3–1)
MONOCYTES NFR BLD: 14.5 % (ref 4–15)
NEUTROPHILS # BLD AUTO: 1 K/UL (ref 1.8–7.7)
NEUTROPHILS NFR BLD: 33.5 % (ref 38–73)
NRBC BLD-RTO: 0 /100 WBC
PLATELET # BLD AUTO: 255 K/UL (ref 150–450)
PMV BLD AUTO: 10.4 FL (ref 9.2–12.9)
POTASSIUM SERPL-SCNC: 3.7 MMOL/L (ref 3.5–5.1)
PROT SERPL-MCNC: 7.7 G/DL (ref 6–8.4)
RBC # BLD AUTO: 5.35 M/UL (ref 4.6–6.2)
SARS-COV-2 RDRP RESP QL NAA+PROBE: POSITIVE
SODIUM SERPL-SCNC: 137 MMOL/L (ref 136–145)
TROPONIN I SERPL DL<=0.01 NG/ML-MCNC: <0.03 NG/ML
WBC # BLD AUTO: 2.9 K/UL (ref 3.9–12.7)

## 2022-02-17 PROCEDURE — U0002 COVID-19 LAB TEST NON-CDC: HCPCS | Performed by: EMERGENCY MEDICINE

## 2022-02-17 PROCEDURE — 80053 COMPREHEN METABOLIC PANEL: CPT | Performed by: EMERGENCY MEDICINE

## 2022-02-17 PROCEDURE — 93005 ELECTROCARDIOGRAM TRACING: CPT | Performed by: SPECIALIST

## 2022-02-17 PROCEDURE — 85025 COMPLETE CBC W/AUTO DIFF WBC: CPT | Performed by: EMERGENCY MEDICINE

## 2022-02-17 PROCEDURE — 93010 ELECTROCARDIOGRAM REPORT: CPT | Mod: ,,, | Performed by: SPECIALIST

## 2022-02-17 PROCEDURE — 83880 ASSAY OF NATRIURETIC PEPTIDE: CPT | Performed by: EMERGENCY MEDICINE

## 2022-02-17 PROCEDURE — 93010 EKG 12-LEAD: ICD-10-PCS | Mod: ,,, | Performed by: SPECIALIST

## 2022-02-17 PROCEDURE — 84484 ASSAY OF TROPONIN QUANT: CPT | Performed by: EMERGENCY MEDICINE

## 2022-02-17 NOTE — ED NOTES
Resting comfortably on stretcher with RR e/u. Admits to becoming dizzy with chest discomfort after using nasal spray for nasal congestion.   Denies cp/sob at this time. Admits s/s resolved. No other complaints/requests. No neuro deficits. BBS C/E. Skin w/d, MARANJEET. +cms. NADN. Mother at bedside.

## 2022-02-17 NOTE — ED PROVIDER NOTES
Encounter Date: 2/16/2022       History     Chief Complaint   Patient presents with    Dizziness     Took half a hydrocodone this am with some robitussin and nasal spray this afternoon when it started     This is a 26-year-old male with history of hypertension who comes in complaining of dizziness.  Patient reports that he has been having a really bad sinus infection.  He has been taking Robitussin and using Afrin repeatedly.  He also took half a hydrocodone.  Patient reports that after he used Afrin several times today he began to feel dizzy and lightheaded.  He felt like his heart was racing.  Patient denies any chest pain or shortness of breath.  No focal weakness or numbness.  He denies any blurry or double vision.  No headache.  He reports that his symptoms were mild to moderate and constant.  He denies any exacerbating or alleviating factors otherwise.        Review of patient's allergies indicates:  No Known Allergies  Past Medical History:   Diagnosis Date    Hypertension      Past Surgical History:   Procedure Laterality Date    ACHILLES TENDON SURGERY       Family History   Family history unknown: Yes     Social History     Tobacco Use    Smoking status: Current Some Day Smoker    Smokeless tobacco: Never Used    Tobacco comment: Cigar   Substance Use Topics    Alcohol use: Yes     Comment: occasionally    Drug use: No     Review of Systems   Constitutional: Negative for chills and fever.   HENT: Positive for congestion. Negative for sore throat and trouble swallowing.    Respiratory: Negative for cough and shortness of breath.    Cardiovascular: Positive for palpitations. Negative for chest pain.   Gastrointestinal: Negative for abdominal pain, diarrhea, nausea and vomiting.   Genitourinary: Negative for dysuria and flank pain.   Musculoskeletal: Negative for back pain and neck pain.   Neurological: Positive for dizziness. Negative for weakness, numbness and headaches.   Psychiatric/Behavioral:  Negative for agitation and confusion.   All other systems reviewed and are negative.      Physical Exam     Initial Vitals [02/16/22 2341]   BP Pulse Resp Temp SpO2   (!) 147/104 88 20 98.3 °F (36.8 °C) 100 %      MAP       --         Physical Exam    Nursing note and vitals reviewed.  Constitutional: Vital signs are normal. He appears well-developed and well-nourished.  Non-toxic appearance. No distress.   HENT:   Head: Normocephalic and atraumatic.   Mouth/Throat: Oropharynx is clear and moist.   Bilateral frontal sinus tenderness to palpation with nasal congestion.   Eyes: Conjunctivae and EOM are normal. Pupils are equal, round, and reactive to light.   Neck: Neck supple.   Normal range of motion.  Cardiovascular: Normal rate, regular rhythm and intact distal pulses.   Pulmonary/Chest: Breath sounds normal. He has no wheezes.   Abdominal: Abdomen is soft. Normal appearance and bowel sounds are normal. There is no abdominal tenderness.   Musculoskeletal:         General: No tenderness or edema. Normal range of motion.      Cervical back: Normal range of motion and neck supple. No rigidity. No muscular tenderness.     Lymphadenopathy:     He has no cervical adenopathy.     He has no axillary adenopathy.   Neurological: He is alert and oriented to person, place, and time. He has normal strength. No cranial nerve deficit or sensory deficit. Gait normal.   Skin: Skin is warm, dry and intact.   Psychiatric: He has a normal mood and affect. His behavior is normal.         ED Course   Procedures  Labs Reviewed   CBC W/ AUTO DIFFERENTIAL - Abnormal; Notable for the following components:       Result Value    WBC 2.90 (*)     MCV 81 (*)     MCH 26.7 (*)     Gran # (ANC) 1.0 (*)     Gran % 33.5 (*)     Eosinophil % 11.4 (*)     All other components within normal limits    Narrative:     Recoll. [id number] by THAD at 02/17/2022 01:22, reason: Specimen   clotted   COMPREHENSIVE METABOLIC PANEL - Abnormal; Notable for the  following components:    Glucose 116 (*)     Alkaline Phosphatase 48 (*)     All other components within normal limits   SARS-COV-2 RNA AMPLIFICATION, QUAL - Abnormal; Notable for the following components:    SARS-CoV-2 RNA, Amplification, Qual Positive (*)     All other components within normal limits   TROPONIN I   B-TYPE NATRIURETIC PEPTIDE    Narrative:     Recoll. [id number] by MS1 at 02/17/2022 01:21, reason: Specimen   clotted     EKG Readings: (Independently Interpreted)   Time: 0043  Rate: 63  Normal sinus rhythm. No ST or T wave abnormality.  No priors.       Imaging Results          X-Ray Chest PA And Lateral (Final result)  Result time 02/17/22 00:35:43    Final result by Carson Ortega DO (02/17/22 00:35:43)                 Narrative:    EXAM DESCRIPTION: XR CHEST PA AND LATERAL    CLINICAL HISTORY:26 years Male, dizziness    Comparison: None    FINDINGS:  No focal lung consolidation.  No pleural effusion. No pneumothorax.  Cardiomediastinal silhouette is within normal limits.  No acute osseous abnormality.    IMPRESSION:    No acute cardiopulmonary disease.    Electronically signed by:  Carson Ortega DO  2/17/2022 12:35 AM CST Workstation: [id number]                               Medications - No data to display  Medical Decision Making:   Initial Assessment:   This is a 26-year-old male with history of hypertension who comes in complaining of dizziness after using Robitussin, often hydrocodone.  On examination his vitals are stable.  Is alert and oriented.  He is neurologically intact.  He does have sinus congestion.  His exam is unremarkable otherwise.    Orders included EKG, CBC, CMP, chest x-ray.  He was hydrated.  Differential Diagnosis:   Arrhythmia, electrolyte abnormality, dehydration, ACS, medication side effect.  Independently Interpreted Test(s):   I have ordered and independently interpreted X-rays - see summary below.       <> Summary of X-Ray Reading(s): Chest x-ray was  independently reviewed by me and showed no infiltrate or effusion.  I have ordered and independently interpreted EKG Reading(s) - see prior notes  Clinical Tests:   Lab Tests: Ordered and Reviewed       <> Summary of Lab: Labs were reviewed and are significant for positive COVID-19 test.  ED Management:  Patient's work-up is significant for COVID19. His symptoms were likely secondary to that an afrin use. He was instructed to stop using afrin. He was instructed regarding quarantine precautions. He is to follow-up outpatient and return to the ER for any concerns.                      Clinical Impression:   Final diagnoses:  [R42] Dizziness  [U07.1] COVID-19 virus infection (Primary)  [T50.905A] Medication side effect, initial encounter          ED Disposition Condition    Discharge Stable        ED Prescriptions     None        Follow-up Information     Follow up With Specialties Details Why Contact Info    Sanchez Hawkins MD Family Medicine In 2 days For recheck [address] [facility] [address] [phone]             Martha Hernandez MD  02/17/22 3350

## 2022-03-21 ENCOUNTER — OFFICE VISIT (OUTPATIENT)
Dept: FAMILY MEDICINE | Facility: CLINIC | Age: 27
End: 2022-03-21
Payer: COMMERCIAL

## 2022-03-21 ENCOUNTER — LAB VISIT (OUTPATIENT)
Dept: LAB | Facility: HOSPITAL | Age: 27
End: 2022-03-21
Payer: COMMERCIAL

## 2022-03-21 VITALS
RESPIRATION RATE: 16 BRPM | HEIGHT: 71 IN | TEMPERATURE: 99 F | HEART RATE: 59 BPM | SYSTOLIC BLOOD PRESSURE: 134 MMHG | OXYGEN SATURATION: 97 % | BODY MASS INDEX: 34.72 KG/M2 | WEIGHT: 248 LBS | DIASTOLIC BLOOD PRESSURE: 78 MMHG

## 2022-03-21 DIAGNOSIS — R03.0 ELEVATED BLOOD PRESSURE READING WITHOUT DIAGNOSIS OF HYPERTENSION: ICD-10-CM

## 2022-03-21 DIAGNOSIS — Z00.00 WELLNESS EXAMINATION: Primary | ICD-10-CM

## 2022-03-21 DIAGNOSIS — Z00.00 WELLNESS EXAMINATION: ICD-10-CM

## 2022-03-21 LAB
CHOLEST SERPL-MCNC: 129 MG/DL (ref 120–199)
CHOLEST/HDLC SERPL: 3.6 {RATIO} (ref 2–5)
ESTIMATED AVG GLUCOSE: 114 MG/DL (ref 68–131)
HBA1C MFR BLD: 5.6 % (ref 4–5.6)
HDLC SERPL-MCNC: 36 MG/DL (ref 40–75)
HDLC SERPL: 27.9 % (ref 20–50)
LDLC SERPL CALC-MCNC: 68.6 MG/DL (ref 63–159)
NONHDLC SERPL-MCNC: 93 MG/DL
TRIGL SERPL-MCNC: 122 MG/DL (ref 30–150)

## 2022-03-21 PROCEDURE — 1159F MED LIST DOCD IN RCRD: CPT | Mod: CPTII,S$GLB,,

## 2022-03-21 PROCEDURE — 3075F SYST BP GE 130 - 139MM HG: CPT | Mod: CPTII,S$GLB,,

## 2022-03-21 PROCEDURE — 99203 OFFICE O/P NEW LOW 30 MIN: CPT | Mod: S$GLB,,,

## 2022-03-21 PROCEDURE — 3008F BODY MASS INDEX DOCD: CPT | Mod: CPTII,S$GLB,,

## 2022-03-21 PROCEDURE — 80061 LIPID PANEL: CPT

## 2022-03-21 PROCEDURE — 99203 PR OFFICE/OUTPT VISIT, NEW, LEVL III, 30-44 MIN: ICD-10-PCS | Mod: S$GLB,,,

## 2022-03-21 PROCEDURE — 3008F PR BODY MASS INDEX (BMI) DOCUMENTED: ICD-10-PCS | Mod: CPTII,S$GLB,,

## 2022-03-21 PROCEDURE — 36415 COLL VENOUS BLD VENIPUNCTURE: CPT | Mod: PO

## 2022-03-21 PROCEDURE — 1160F RVW MEDS BY RX/DR IN RCRD: CPT | Mod: CPTII,S$GLB,,

## 2022-03-21 PROCEDURE — 99999 PR PBB SHADOW E&M-EST. PATIENT-LVL IV: ICD-10-PCS | Mod: PBBFAC,,,

## 2022-03-21 PROCEDURE — 3078F PR MOST RECENT DIASTOLIC BLOOD PRESSURE < 80 MM HG: ICD-10-PCS | Mod: CPTII,S$GLB,,

## 2022-03-21 PROCEDURE — 83036 HEMOGLOBIN GLYCOSYLATED A1C: CPT

## 2022-03-21 PROCEDURE — 99999 PR PBB SHADOW E&M-EST. PATIENT-LVL IV: CPT | Mod: PBBFAC,,,

## 2022-03-21 PROCEDURE — 1159F PR MEDICATION LIST DOCUMENTED IN MEDICAL RECORD: ICD-10-PCS | Mod: CPTII,S$GLB,,

## 2022-03-21 PROCEDURE — 1160F PR REVIEW ALL MEDS BY PRESCRIBER/CLIN PHARMACIST DOCUMENTED: ICD-10-PCS | Mod: CPTII,S$GLB,,

## 2022-03-21 PROCEDURE — 3078F DIAST BP <80 MM HG: CPT | Mod: CPTII,S$GLB,,

## 2022-03-21 PROCEDURE — 3075F PR MOST RECENT SYSTOLIC BLOOD PRESS GE 130-139MM HG: ICD-10-PCS | Mod: CPTII,S$GLB,,

## 2022-03-21 NOTE — PROGRESS NOTES
Subjective:       Patient ID: Chester Red Jr. is a 26 y.o. male.    Chief Complaint: Establish Care    Chester Red is a 25 yo M pt w/ Mhx listed below that presents to the clinic to establish care. He does have concerns about elevated blood pressures. He called EMS recently for dizziness, headaches and chest pain after smoking a new vape brand. The medical providers recommended that he follow up with primary care to discuss elevated blood pressures. He has not been monitoring his pressures at home and he is not currently on therapy for HTN. He admits that he eats a lot of salt in his diet. He has high end of normal pressures at presentation today. Patient states that he would prefer lifestyle modifications over starting a daily medication at this time for this problem .      Past Medical History:   Diagnosis Date    Hypertension        Review of patient's allergies indicates:  No Known Allergies      Current Outpatient Medications:     acetaminophen (TYLENOL) 325 MG tablet, Take 325 mg by mouth every 6 (six) hours as needed for Pain., Disp: , Rfl:     Review of Systems   Constitutional: Negative for activity change, appetite change, chills, diaphoresis, fatigue, fever and unexpected weight change.   HENT: Negative for congestion, ear pain, postnasal drip, rhinorrhea, sinus pressure, sneezing, sore throat and trouble swallowing.    Eyes: Negative for pain, itching and visual disturbance.   Respiratory: Negative for cough, chest tightness, shortness of breath and wheezing.    Cardiovascular: Negative for chest pain, palpitations and leg swelling.   Gastrointestinal: Negative for abdominal distention, abdominal pain, blood in stool, constipation, diarrhea, nausea and vomiting.   Endocrine: Negative for cold intolerance and heat intolerance.   Genitourinary: Negative for difficulty urinating, dysuria, frequency, hematuria and urgency.   Musculoskeletal: Negative for arthralgias, back pain, myalgias and neck  "pain.   Skin: Negative for color change, pallor, rash and wound.   Neurological: Positive for dizziness, light-headedness and headaches. Negative for syncope, weakness and numbness.   Hematological: Negative for adenopathy.   Psychiatric/Behavioral: Negative for behavioral problems. The patient is not nervous/anxious.        Objective:      /78 (BP Location: Right arm, Patient Position: Sitting, BP Method: Large (Manual))   Pulse (!) 59   Temp 98.8 °F (37.1 °C) (Oral)   Resp 16   Ht 5' 11" (1.803 m)   Wt 112.5 kg (248 lb 0.3 oz)   SpO2 97%   BMI 34.59 kg/m²   Physical Exam  Vitals reviewed.   Constitutional:       General: He is not in acute distress.     Appearance: Normal appearance. He is obese. He is not ill-appearing, toxic-appearing or diaphoretic.   HENT:      Head: Normocephalic.      Right Ear: External ear normal.      Left Ear: External ear normal.      Nose: Nose normal. No congestion or rhinorrhea.      Mouth/Throat:      Mouth: Mucous membranes are moist.      Pharynx: Oropharynx is clear.   Eyes:      General: No scleral icterus.        Right eye: No discharge.         Left eye: No discharge.      Extraocular Movements: Extraocular movements intact.      Conjunctiva/sclera: Conjunctivae normal.   Cardiovascular:      Rate and Rhythm: Normal rate and regular rhythm.      Pulses: Normal pulses.      Heart sounds: Normal heart sounds. No murmur heard.    No friction rub. No gallop.   Pulmonary:      Effort: Pulmonary effort is normal. No respiratory distress.      Breath sounds: Normal breath sounds. No wheezing, rhonchi or rales.   Chest:      Chest wall: No tenderness.   Abdominal:      General: There is no distension.      Palpations: Abdomen is soft. There is no mass.      Tenderness: There is no abdominal tenderness. There is no guarding.   Musculoskeletal:         General: No swelling, tenderness or deformity. Normal range of motion.      Cervical back: Normal range of motion.      " Right lower leg: No edema.      Left lower leg: No edema.   Skin:     General: Skin is warm and dry.      Capillary Refill: Capillary refill takes less than 2 seconds.      Coloration: Skin is not jaundiced.      Findings: No bruising, erythema, lesion or rash.   Neurological:      Mental Status: He is alert and oriented to person, place, and time.      Gait: Gait normal.   Psychiatric:         Mood and Affect: Mood normal.         Behavior: Behavior normal.         Thought Content: Thought content normal.         Judgment: Judgment normal.         Assessment:       1. Wellness examination    2. Elevated blood pressure reading without diagnosis of hypertension        Plan:       Wellness examination  -     Lipid Panel; Future; Expected date: 03/21/2022  -     Hemoglobin A1C; Future; Expected date: 03/21/2022    Elevated blood pressure reading without diagnosis of hypertension        -      Likely situational or related to patient's excessive salt intake. Recommended modifying diet and regular exercise to manage this problem .        Patient to follow up in 12 months w/ Dr. Mckeon to establish care.        Darío Rios PA-C  Family Medicine Physician Assistant       I spent a total of 20 minutes on the day of the visit.This includes face to face time and non-face to face time preparing to see the patient (eg, review of tests), obtaining and/or reviewing separately obtained history, documenting clinical information in the electronic or other health record, independently interpreting results and communicating results to the patient/family/caregiver, or care coordinator.      We have addressed [3] Low: 2 or more self-limited or minor problems / 1 stable chronic illness / 1 acute, uncomplicated illness or injury  The complexity of the data reviewed and analyzed for this visit was [3] Limited (Reviewed prior external note, ordered unique testing or reviewed the results of each unique test)   The risk of complications  and/or morbidity or mortality are [3] Low risk   The level of Medical Decision Making for this visit is [3] Low

## 2022-03-21 NOTE — PATIENT INSTRUCTIONS
If you are due for any health screening(s) below please notify me so we can arrange them to be ordered and scheduled to maintain your health.     Health Maintenance   Topic Date Due    Lipid Panel  Never done    TETANUS VACCINE  Never done    Hepatitis C Screening  Completed

## 2022-03-25 ENCOUNTER — LAB VISIT (OUTPATIENT)
Dept: LAB | Facility: HOSPITAL | Age: 27
End: 2022-03-25
Attending: NURSE PRACTITIONER
Payer: COMMERCIAL

## 2022-03-25 ENCOUNTER — OFFICE VISIT (OUTPATIENT)
Dept: FAMILY MEDICINE | Facility: CLINIC | Age: 27
End: 2022-03-25
Payer: COMMERCIAL

## 2022-03-25 VITALS
BODY MASS INDEX: 34.9 KG/M2 | DIASTOLIC BLOOD PRESSURE: 82 MMHG | WEIGHT: 249.31 LBS | HEIGHT: 71 IN | TEMPERATURE: 98 F | OXYGEN SATURATION: 98 % | SYSTOLIC BLOOD PRESSURE: 136 MMHG | HEART RATE: 61 BPM

## 2022-03-25 DIAGNOSIS — R10.12 LUQ ABDOMINAL PAIN: Primary | ICD-10-CM

## 2022-03-25 DIAGNOSIS — R10.12 LUQ ABDOMINAL PAIN: ICD-10-CM

## 2022-03-25 LAB
ALBUMIN SERPL BCP-MCNC: 4 G/DL (ref 3.5–5.2)
ALP SERPL-CCNC: 62 U/L (ref 55–135)
ALT SERPL W/O P-5'-P-CCNC: 27 U/L (ref 10–44)
ANION GAP SERPL CALC-SCNC: 8 MMOL/L (ref 8–16)
AST SERPL-CCNC: 22 U/L (ref 10–40)
BASOPHILS # BLD AUTO: 0.03 K/UL (ref 0–0.2)
BASOPHILS NFR BLD: 0.6 % (ref 0–1.9)
BILIRUB SERPL-MCNC: 0.6 MG/DL (ref 0.1–1)
BUN SERPL-MCNC: 10 MG/DL (ref 6–20)
CALCIUM SERPL-MCNC: 10.1 MG/DL (ref 8.7–10.5)
CHLORIDE SERPL-SCNC: 100 MMOL/L (ref 95–110)
CO2 SERPL-SCNC: 30 MMOL/L (ref 23–29)
CREAT SERPL-MCNC: 1 MG/DL (ref 0.5–1.4)
DIFFERENTIAL METHOD: ABNORMAL
EOSINOPHIL # BLD AUTO: 0.2 K/UL (ref 0–0.5)
EOSINOPHIL NFR BLD: 3.9 % (ref 0–8)
ERYTHROCYTE [DISTWIDTH] IN BLOOD BY AUTOMATED COUNT: 14.1 % (ref 11.5–14.5)
EST. GFR  (AFRICAN AMERICAN): >60 ML/MIN/1.73 M^2
EST. GFR  (NON AFRICAN AMERICAN): >60 ML/MIN/1.73 M^2
GLUCOSE SERPL-MCNC: 95 MG/DL (ref 70–110)
HCT VFR BLD AUTO: 45 % (ref 40–54)
HGB BLD-MCNC: 14 G/DL (ref 14–18)
IMM GRANULOCYTES # BLD AUTO: 0.01 K/UL (ref 0–0.04)
IMM GRANULOCYTES NFR BLD AUTO: 0.2 % (ref 0–0.5)
LIPASE SERPL-CCNC: 37 U/L (ref 4–60)
LYMPHOCYTES # BLD AUTO: 1.5 K/UL (ref 1–4.8)
LYMPHOCYTES NFR BLD: 31.9 % (ref 18–48)
MCH RBC QN AUTO: 26.7 PG (ref 27–31)
MCHC RBC AUTO-ENTMCNC: 31.1 G/DL (ref 32–36)
MCV RBC AUTO: 86 FL (ref 82–98)
MONOCYTES # BLD AUTO: 0.5 K/UL (ref 0.3–1)
MONOCYTES NFR BLD: 10.7 % (ref 4–15)
NEUTROPHILS # BLD AUTO: 2.5 K/UL (ref 1.8–7.7)
NEUTROPHILS NFR BLD: 52.7 % (ref 38–73)
NRBC BLD-RTO: 0 /100 WBC
PLATELET # BLD AUTO: 293 K/UL (ref 150–450)
PMV BLD AUTO: 10.6 FL (ref 9.2–12.9)
POTASSIUM SERPL-SCNC: 4.4 MMOL/L (ref 3.5–5.1)
PROT SERPL-MCNC: 7.7 G/DL (ref 6–8.4)
RBC # BLD AUTO: 5.25 M/UL (ref 4.6–6.2)
SODIUM SERPL-SCNC: 138 MMOL/L (ref 136–145)
WBC # BLD AUTO: 4.67 K/UL (ref 3.9–12.7)

## 2022-03-25 PROCEDURE — 99214 OFFICE O/P EST MOD 30 MIN: CPT | Mod: S$GLB,,, | Performed by: NURSE PRACTITIONER

## 2022-03-25 PROCEDURE — 80053 COMPREHEN METABOLIC PANEL: CPT | Performed by: NURSE PRACTITIONER

## 2022-03-25 PROCEDURE — 3008F PR BODY MASS INDEX (BMI) DOCUMENTED: ICD-10-PCS | Mod: CPTII,S$GLB,, | Performed by: NURSE PRACTITIONER

## 2022-03-25 PROCEDURE — 85025 COMPLETE CBC W/AUTO DIFF WBC: CPT | Performed by: NURSE PRACTITIONER

## 2022-03-25 PROCEDURE — 3079F DIAST BP 80-89 MM HG: CPT | Mod: CPTII,S$GLB,, | Performed by: NURSE PRACTITIONER

## 2022-03-25 PROCEDURE — 3008F BODY MASS INDEX DOCD: CPT | Mod: CPTII,S$GLB,, | Performed by: NURSE PRACTITIONER

## 2022-03-25 PROCEDURE — 1159F MED LIST DOCD IN RCRD: CPT | Mod: CPTII,S$GLB,, | Performed by: NURSE PRACTITIONER

## 2022-03-25 PROCEDURE — 3044F HG A1C LEVEL LT 7.0%: CPT | Mod: CPTII,S$GLB,, | Performed by: NURSE PRACTITIONER

## 2022-03-25 PROCEDURE — 99999 PR PBB SHADOW E&M-EST. PATIENT-LVL III: ICD-10-PCS | Mod: PBBFAC,,, | Performed by: NURSE PRACTITIONER

## 2022-03-25 PROCEDURE — 3079F PR MOST RECENT DIASTOLIC BLOOD PRESSURE 80-89 MM HG: ICD-10-PCS | Mod: CPTII,S$GLB,, | Performed by: NURSE PRACTITIONER

## 2022-03-25 PROCEDURE — 3044F PR MOST RECENT HEMOGLOBIN A1C LEVEL <7.0%: ICD-10-PCS | Mod: CPTII,S$GLB,, | Performed by: NURSE PRACTITIONER

## 2022-03-25 PROCEDURE — 3075F PR MOST RECENT SYSTOLIC BLOOD PRESS GE 130-139MM HG: ICD-10-PCS | Mod: CPTII,S$GLB,, | Performed by: NURSE PRACTITIONER

## 2022-03-25 PROCEDURE — 36415 COLL VENOUS BLD VENIPUNCTURE: CPT | Mod: PO | Performed by: NURSE PRACTITIONER

## 2022-03-25 PROCEDURE — 99214 PR OFFICE/OUTPT VISIT, EST, LEVL IV, 30-39 MIN: ICD-10-PCS | Mod: S$GLB,,, | Performed by: NURSE PRACTITIONER

## 2022-03-25 PROCEDURE — 99999 PR PBB SHADOW E&M-EST. PATIENT-LVL III: CPT | Mod: PBBFAC,,, | Performed by: NURSE PRACTITIONER

## 2022-03-25 PROCEDURE — 83690 ASSAY OF LIPASE: CPT | Performed by: NURSE PRACTITIONER

## 2022-03-25 PROCEDURE — 3075F SYST BP GE 130 - 139MM HG: CPT | Mod: CPTII,S$GLB,, | Performed by: NURSE PRACTITIONER

## 2022-03-25 PROCEDURE — 1159F PR MEDICATION LIST DOCUMENTED IN MEDICAL RECORD: ICD-10-PCS | Mod: CPTII,S$GLB,, | Performed by: NURSE PRACTITIONER

## 2022-03-25 RX ORDER — OMEPRAZOLE 40 MG/1
40 CAPSULE, DELAYED RELEASE ORAL EVERY MORNING
Qty: 30 CAPSULE | Refills: 0 | Status: SHIPPED | OUTPATIENT
Start: 2022-03-25 | End: 2022-04-21

## 2022-03-25 NOTE — PROGRESS NOTES
This dictation has been generated using Modal Fluency Dictation some phonetic errors may occur. Please contact author for clarification if needed.     Problem List Items Addressed This Visit    None     Visit Diagnoses     LUQ abdominal pain    -  Primary    Relevant Orders    CBC Auto Differential    Comprehensive Metabolic Panel    Urinalysis    Lipase          Orders Placed This Encounter    CBC Auto Differential    Comprehensive Metabolic Panel    Urinalysis    Lipase    omeprazole (PRILOSEC) 40 MG capsule     Left upper quadrant abdominal pain CBC CMP urinalysis lipase.  Rule out anemia.  Check liver and pancreatic enzymes.  Urinalysis rule out UTI.  Suspect gastritis brief omeprazole use as above.  Work note    No follow-ups on file.    ________________________________________________________________  ________________________________________________________________      Chief Complaint   Patient presents with    Abdominal Pain     History of present illness  This 27 y.o. presents today for complaint of left upper quadrant abdominal pain.  Symptoms present for a week.  He started taking lot of Pepto-Bismol and has black stools.  Denies any blood or mucus in stool.  No nausea vomiting diarrhea.  Eating does not trigger the pain.  Denies excessive alcohol intake.  No ibuprofen use or NSAIDs.  Limited review of systems negative      Past Medical History:   Diagnosis Date    Hypertension        Past Surgical History:   Procedure Laterality Date    ACHILLES TENDON SURGERY         Family History   Family history unknown: Yes       Social History     Socioeconomic History    Marital status: Single   Tobacco Use    Smoking status: Current Some Day Smoker    Smokeless tobacco: Never Used    Tobacco comment: Cigar   Substance and Sexual Activity    Alcohol use: Yes     Comment: occasionally    Drug use: No    Sexual activity: Never       Current Outpatient Medications   Medication Sig Dispense Refill     acetaminophen (TYLENOL) 325 MG tablet Take 325 mg by mouth every 6 (six) hours as needed for Pain.      omeprazole (PRILOSEC) 40 MG capsule Take 1 capsule (40 mg total) by mouth every morning. 30 capsule 0     No current facility-administered medications for this visit.       Review of patient's allergies indicates:  No Known Allergies    Physical examination  Vitals Reviewed\  Vitals:    03/25/22 0720   BP: 136/82   Pulse: 61   Temp: 98 °F (36.7 °C)     Body mass index is 34.78 kg/m². .    Weight: 113.1 kg (249 lb 5.4 oz)    Gen. Well-dressed well-nourished   Skin warm dry and intact.  No rashes noted.  HEENT.  Masked     Neck is supple without adenopathy  Chest.  Respirations are even unlabored.  Lungs are clear to auscultation.  Cardiac regular rate and rhythm.  No chest wall adenopathy noted.  Abdomen is soft and not distended.  Bowel sounds are present.  No tenderness during palpation of the abdomen.  No Hepatosplenomegaly noted.  No hernia noted.  No CVA tenderness to percussion.    Neuro. Awake alert oriented x4.  Normal judgment and cognition noted.  Extremities no clubbing cyanosis or edema noted.     Call or return to clinic prn if these symptoms worsen or fail to improve as anticipated.

## 2022-03-28 ENCOUNTER — TELEPHONE (OUTPATIENT)
Dept: FAMILY MEDICINE | Facility: CLINIC | Age: 27
End: 2022-03-28
Payer: COMMERCIAL

## 2022-03-28 NOTE — TELEPHONE ENCOUNTER
"----- Message from Melissa Rhodes sent at 3/28/2022  3:29 PM CDT -----  Regarding: Rest Results  "Type:  Patient Call Back    Who Called:JAJA RAJAN JR. [[id number]]    What is the reqeust in detail:Pt requesting call back to receive test results from lab on 3/25. Please advise    Can the clinic reply by [facility]?no    Best Call Back Number:[phone]      Additional Information:            "

## 2022-06-22 ENCOUNTER — LAB VISIT (OUTPATIENT)
Dept: LAB | Facility: HOSPITAL | Age: 27
End: 2022-06-22
Attending: STUDENT IN AN ORGANIZED HEALTH CARE EDUCATION/TRAINING PROGRAM
Payer: COMMERCIAL

## 2022-06-22 ENCOUNTER — OFFICE VISIT (OUTPATIENT)
Dept: FAMILY MEDICINE | Facility: CLINIC | Age: 27
End: 2022-06-22
Payer: COMMERCIAL

## 2022-06-22 VITALS
BODY MASS INDEX: 34.72 KG/M2 | SYSTOLIC BLOOD PRESSURE: 140 MMHG | HEIGHT: 71 IN | DIASTOLIC BLOOD PRESSURE: 90 MMHG | HEART RATE: 80 BPM | WEIGHT: 248 LBS

## 2022-06-22 DIAGNOSIS — K20.90 ESOPHAGITIS: ICD-10-CM

## 2022-06-22 DIAGNOSIS — Z72.0 TOBACCO USE: ICD-10-CM

## 2022-06-22 DIAGNOSIS — R55 VASOVAGAL SYNCOPE: Primary | ICD-10-CM

## 2022-06-22 PROCEDURE — 3080F PR MOST RECENT DIASTOLIC BLOOD PRESSURE >= 90 MM HG: ICD-10-PCS | Mod: CPTII,S$GLB,, | Performed by: STUDENT IN AN ORGANIZED HEALTH CARE EDUCATION/TRAINING PROGRAM

## 2022-06-22 PROCEDURE — 1159F MED LIST DOCD IN RCRD: CPT | Mod: CPTII,S$GLB,, | Performed by: STUDENT IN AN ORGANIZED HEALTH CARE EDUCATION/TRAINING PROGRAM

## 2022-06-22 PROCEDURE — 99999 PR PBB SHADOW E&M-EST. PATIENT-LVL IV: ICD-10-PCS | Mod: PBBFAC,,, | Performed by: STUDENT IN AN ORGANIZED HEALTH CARE EDUCATION/TRAINING PROGRAM

## 2022-06-22 PROCEDURE — 99213 OFFICE O/P EST LOW 20 MIN: CPT | Mod: 25,S$GLB,, | Performed by: STUDENT IN AN ORGANIZED HEALTH CARE EDUCATION/TRAINING PROGRAM

## 2022-06-22 PROCEDURE — 3080F DIAST BP >= 90 MM HG: CPT | Mod: CPTII,S$GLB,, | Performed by: STUDENT IN AN ORGANIZED HEALTH CARE EDUCATION/TRAINING PROGRAM

## 2022-06-22 PROCEDURE — 1159F PR MEDICATION LIST DOCUMENTED IN MEDICAL RECORD: ICD-10-PCS | Mod: CPTII,S$GLB,, | Performed by: STUDENT IN AN ORGANIZED HEALTH CARE EDUCATION/TRAINING PROGRAM

## 2022-06-22 PROCEDURE — 1160F PR REVIEW ALL MEDS BY PRESCRIBER/CLIN PHARMACIST DOCUMENTED: ICD-10-PCS | Mod: CPTII,S$GLB,, | Performed by: STUDENT IN AN ORGANIZED HEALTH CARE EDUCATION/TRAINING PROGRAM

## 2022-06-22 PROCEDURE — 3044F PR MOST RECENT HEMOGLOBIN A1C LEVEL <7.0%: ICD-10-PCS | Mod: CPTII,S$GLB,, | Performed by: STUDENT IN AN ORGANIZED HEALTH CARE EDUCATION/TRAINING PROGRAM

## 2022-06-22 PROCEDURE — 3044F HG A1C LEVEL LT 7.0%: CPT | Mod: CPTII,S$GLB,, | Performed by: STUDENT IN AN ORGANIZED HEALTH CARE EDUCATION/TRAINING PROGRAM

## 2022-06-22 PROCEDURE — 99406 PR TOBACCO USE CESSATION INTERMEDIATE 3-10 MINUTES: ICD-10-PCS | Mod: S$GLB,,, | Performed by: STUDENT IN AN ORGANIZED HEALTH CARE EDUCATION/TRAINING PROGRAM

## 2022-06-22 PROCEDURE — 3077F PR MOST RECENT SYSTOLIC BLOOD PRESSURE >= 140 MM HG: ICD-10-PCS | Mod: CPTII,S$GLB,, | Performed by: STUDENT IN AN ORGANIZED HEALTH CARE EDUCATION/TRAINING PROGRAM

## 2022-06-22 PROCEDURE — 3008F PR BODY MASS INDEX (BMI) DOCUMENTED: ICD-10-PCS | Mod: CPTII,S$GLB,, | Performed by: STUDENT IN AN ORGANIZED HEALTH CARE EDUCATION/TRAINING PROGRAM

## 2022-06-22 PROCEDURE — 99406 BEHAV CHNG SMOKING 3-10 MIN: CPT | Mod: S$GLB,,, | Performed by: STUDENT IN AN ORGANIZED HEALTH CARE EDUCATION/TRAINING PROGRAM

## 2022-06-22 PROCEDURE — 99999 PR PBB SHADOW E&M-EST. PATIENT-LVL IV: CPT | Mod: PBBFAC,,, | Performed by: STUDENT IN AN ORGANIZED HEALTH CARE EDUCATION/TRAINING PROGRAM

## 2022-06-22 PROCEDURE — 99213 PR OFFICE/OUTPT VISIT, EST, LEVL III, 20-29 MIN: ICD-10-PCS | Mod: 25,S$GLB,, | Performed by: STUDENT IN AN ORGANIZED HEALTH CARE EDUCATION/TRAINING PROGRAM

## 2022-06-22 PROCEDURE — 3077F SYST BP >= 140 MM HG: CPT | Mod: CPTII,S$GLB,, | Performed by: STUDENT IN AN ORGANIZED HEALTH CARE EDUCATION/TRAINING PROGRAM

## 2022-06-22 PROCEDURE — 87338 HPYLORI STOOL AG IA: CPT | Performed by: STUDENT IN AN ORGANIZED HEALTH CARE EDUCATION/TRAINING PROGRAM

## 2022-06-22 PROCEDURE — 3008F BODY MASS INDEX DOCD: CPT | Mod: CPTII,S$GLB,, | Performed by: STUDENT IN AN ORGANIZED HEALTH CARE EDUCATION/TRAINING PROGRAM

## 2022-06-22 PROCEDURE — 1160F RVW MEDS BY RX/DR IN RCRD: CPT | Mod: CPTII,S$GLB,, | Performed by: STUDENT IN AN ORGANIZED HEALTH CARE EDUCATION/TRAINING PROGRAM

## 2022-06-22 RX ORDER — SUCRALFATE 1 G/1
1 TABLET ORAL 4 TIMES DAILY
Qty: 28 TABLET | Refills: 0 | Status: SHIPPED | OUTPATIENT
Start: 2022-06-22 | End: 2022-06-27

## 2022-06-22 RX ORDER — OMEPRAZOLE 40 MG/1
40 CAPSULE, DELAYED RELEASE ORAL 2 TIMES DAILY
Qty: 60 CAPSULE | Refills: 0 | Status: SHIPPED | OUTPATIENT
Start: 2022-06-22 | End: 2022-06-29 | Stop reason: SDUPTHER

## 2022-06-22 NOTE — PROGRESS NOTES
"[facility] MEDICINE CLINIC NOTE    Patient Name: Chester Red Jr.  YOB: 1995    PRESENTING HISTORY   Chief Complaint:   Chief Complaint   Patient presents with    Headache    Tachycardia        History of Present Illness:  Mr. Chester Red Jr. is a 27 y.o. male     Here with 2 issues.   1.   Epigastric and right chest pain  He attributes to heartburn.   A/w burping.   Recurrent, not improving for the last 3 months since onset.   Already on daily PPI.   No sig change with eating.     2.   On father's day had headache. Went to bathroom. HR went from 72 to 146 per smart watch.   Dizzy, room spinning.   A little shaky                                           Review of Systems   All other systems reviewed and are negative.        PAST HISTORY:     Past Medical History:   Diagnosis Date    Hypertension        Past Surgical History:   Procedure Laterality Date    ACHILLES TENDON SURGERY         Family History   Family history unknown: Yes       Social History     Socioeconomic History    Marital status: Single   Tobacco Use    Smoking status: Current Some Day Smoker    Smokeless tobacco: Never Used    Tobacco comment: Cigar   Substance and Sexual Activity    Alcohol use: Yes     Comment: occasionally    Drug use: No    Sexual activity: Never       MEDICATIONS & ALLERGIES:     Current Outpatient Medications on File Prior to Visit   Medication Sig    omeprazole (PRILOSEC) 40 MG capsule TAKE 1 CAPSULE(40 MG) BY MOUTH EVERY MORNING    acetaminophen (TYLENOL) 325 MG tablet Take 325 mg by mouth every 6 (six) hours as needed for Pain.     No current facility-administered medications on file prior to visit.       Review of patient's allergies indicates:  No Known Allergies    OBJECTIVE:   Vital Signs:  Vitals:    06/22/22 0857   BP: (!) 140/90   Pulse: 80   Weight: 112.5 kg (248 lb 0.3 oz)   Height: 5' 11" (1.803 m)       No results found for this or any previous visit (from the past 24 " hour(s)).      Physical Exam  Vitals and nursing note reviewed.   Constitutional:       General: He is not in acute distress.     Appearance: He is not ill-appearing, toxic-appearing or diaphoretic.   HENT:      Head: Normocephalic and atraumatic.      Right Ear: External ear normal.      Left Ear: External ear normal.   Eyes:      General: No scleral icterus.     Conjunctiva/sclera: Conjunctivae normal.      Pupils: Pupils are equal, round, and reactive to light.      Comments: No conjunctival pallor   Neck:      Thyroid: No thyromegaly.   Cardiovascular:      Rate and Rhythm: Normal rate and regular rhythm.      Heart sounds: Normal heart sounds. No murmur heard.  Pulmonary:      Effort: Pulmonary effort is normal. No respiratory distress.      Breath sounds: Normal breath sounds. No wheezing or rales.   Abdominal:      Tenderness: There is abdominal tenderness (epigastrium). There is no guarding or rebound.   Musculoskeletal:         General: No tenderness or deformity. Normal range of motion.      Cervical back: Normal range of motion and neck supple.   Lymphadenopathy:      Cervical: No cervical adenopathy.   Skin:     General: Skin is warm and dry.      Findings: No erythema or rash.   Neurological:      Mental Status: He is alert and oriented to person, place, and time.      Gait: Gait is intact.   Psychiatric:         Mood and Affect: Mood and affect normal.         Cognition and Memory: Memory normal.         Judgment: Judgment normal.         ASSESSMENT & PLAN:     Vasovagal syncope  -     Holter monitor - 48 hour; Future  Symptoms most consistent with vagal episode    Esophagitis  -     Ambulatory referral/consult to Gastroenterology; Future; Expected date: 06/29/2022  -     omeprazole (PRILOSEC) 40 MG capsule; Take 1 capsule (40 mg total) by mouth 2 (two) times a day.  Dispense: 60 capsule; Refill: 0  -     sucralfate (CARAFATE) 1 gram tablet; Take 1 tablet (1 g total) by mouth 4 (four) times daily. for  7 days  Dispense: 28 tablet; Refill: 0  -     H. pylori antigen, stool; Future; Expected date: 06/22/2022  Suspected esophagitis as etiology of symptoms.   May benefit from EGD.   Referral to GI for consideration.   In meantime, increase intensity of stomach regimen.     Tobacco use  Discussed strategies for quitting, setting quit date, explored state of change. Total time 4 minutes       Armando Mckeon MD   Internal Medicine    This note was created using Dragon voice recognition software that occasionally misinterprets phrases or words.

## 2022-06-22 NOTE — PATIENT INSTRUCTIONS
"Take omeprazole twice daily.     Soren Briggs,     If you are due for any health screening(s) below please notify me so we can arrange them to be ordered and scheduled to maintain your health. Most healthy patients complete it. Don't lose out on improving your health.     Health Maintenance   Topic Date Due    TETANUS VACCINE  01/01/2024    Hepatitis C Screening  Completed    Lipid Panel  Completed                           Patient Education       Checking Your Blood Pressure at Home   The Basics   Written by the doctors and editors at [facility]   How is blood pressure measured? -- Blood pressure is usually measured with a device that goes around your upper arm. This is often done in a doctor's office. But some people also check their blood pressure themselves, at home or at work.  Blood pressure is explained with 2 numbers. For instance, your blood pressure might be "140 over 90." The first (top) number is the pressure inside your arteries when your heart is wyatt. The second (bottom) number is the pressure inside your arteries when your heart is relaxed. The table shows how doctors and nurses define high and normal blood pressure (table 1).  If your blood pressure gets too high, it puts you at risk for heart attack, stroke, and kidney disease. High blood pressure does not usually cause symptoms. But it can be serious.  What is a home blood pressure meter? -- A home blood pressure meter (or "monitor") is a device you can use to check your blood pressure yourself. It has a cuff that goes around your upper arm (figure 1). Some devices have a cuff that goes around your wrist instead. But doctors aren't sure if these work as well. The meter also has a small screen, or dial, that shows your blood pressure numbers.  There are also special meters you can wear for a day or 2. These are different because they automatically check your blood pressure throughout the day and night, even while you are sleeping. If your doctor " thinks you should use one of these devices, they will talk to you about how to wear it.  Why do I need to check my blood pressure at home? -- If your doctor knows or suspects that you have high blood pressure, they might want you to check it at home. There are a few reasons for this. Your doctor might want to look at:  Whether your blood pressure measures the same at home as it did in the doctor's office  How well your blood pressure medicines are working  Changes in your blood pressure, for example, if it goes up and down  People who check their own blood pressure at home usually do better at keeping it low.  How do I choose a home blood pressure meter? -- When choosing a home blood pressure meter, you will probably want to think about:  Cost - Some devices cost more than others. You should also check to see if your insurance will help pay for your device.  Size - It's important to make sure the cuff fits your arm comfortably. Your doctor or nurse can help you with this.  How easy it is to use - You should make sure you understand how to use the device. You also need to be able to read the numbers on the screen.  You do not need a prescription to buy a home blood pressure meter. You can buy them at most pharmacies or over the internet. Your doctor or nurse can help you choose the right device for you.  How do I check my blood pressure at home? -- Once you have a home blood pressure meter, your doctor or nurse should check it to make sure it fits you and works correctly.  When it's time to check your blood pressure:  Go to the bathroom and empty your bladder first. Having a full bladder can temporarily increase your blood pressure, making the results inaccurate.  Sit in a chair with your feet flat on the ground.  Try to breathe normally and stay calm.  Attach the cuff to your arm. Place the cuff directly on your skin, not over your clothing. The cuff should be tight enough to not slip down, but not uncomfortably  tight.  Sit and relax for about 3 to 5 minutes with the cuff on.  Follow the directions that came with your device to start measuring your blood pressure. This might involve squeezing the bulb at the end of the tube to inflate the cuff (fill it with air). With some monitors, you just need to press a button to inflate the cuff. When the cuff fills with air, it feels like someone is squeezing your arm, but it should not hurt. Then you will slowly deflate the cuff (let the air out of it), or it will deflate by itself. The screen or dial will show your blood pressure numbers.  Stay seated and relax for 1 minute, then measure your blood pressure again.  How often should I check my blood pressure? -- It depends. Different people need to follow different schedules. Your doctor or nurse will tell you how often to check your blood pressure, and when. Some people need to check their blood pressure twice a day, in the morning and evening.  Your doctor or nurse will probably tell you to keep track of your blood pressure for at least a few days (table 2). Then they will look at the numbers. The reason for this is that it's normal for your blood pressure to change a bit from day to day. For example, the numbers might change depending on whether you recently had caffeine, just exercised, or feel stressed. Checking your blood pressure over several days - or longer - will give your doctor or nurse a better idea of what is average for you.  How should I keep track of my blood pressure? -- Some blood pressure meters will record your numbers for you, or send them to your computer or smartphone. If yours does not do this, you will need to write them down. Your doctor or nurse can help you figure out the best way to keep track of the numbers.  What if my blood pressure is high? -- Your doctor or nurse will tell you what to do if your blood pressure is high when you check it at home. If you get a number that is higher than normal, measure  "it again to see if it is still high. If it is very high (above a certain number, which your doctor or nurse will tell you to watch out for), you should call your doctor right away.  If your blood pressure is only a little high, your doctor or nurse might tell you to keep checking it for a few more days or weeks, and then call if it does not go back down. Then they can help you decide what to do next.  All topics are updated as new evidence becomes available and our peer review process is complete.  This topic retrieved from Quarterly on: Sep 21, 2021.  Topic 791438 Version 4.0  Release: 29.4.2 - C29.263  © 2021 UpToDate, Inc. and/or its affiliates. All rights reserved.  table 1: Definition of normal and high blood pressure  Level  Top number  Bottom number    High 130 or above 80 or above   Elevated 120 to 129 79 or below   Normal 119 or below 79 or below   These definitions are from the American College of Cardiology/American Heart Association. Other expert groups might use slightly different definitions.  "Elevated blood pressure" is a term doctor or nurses use as a warning. It means you do not yet have high blood pressure, but your blood pressure is not as low as it should be for good health.  Graphic 87598 Version 6.0  figure 1: Using a home blood pressure meter     This is an example of a person using a home blood pressure meter.  Graphic 033606 Version 1.0    table 2: 7-day diary for checking blood pressure at home  Day 1  Day 2  Day 3  Day 4  Day 5  Day 6  Day 7    Morning  1st read Morning  1st read Morning  1st read Morning  1st read Morning  1st read Morning  1st read Morning  1st read   Systolic: __________ Systolic: __________ Systolic: __________ Systolic: __________ Systolic: __________ Systolic: __________ Systolic: __________   Diastolic: __________ Diastolic: __________ Diastolic: __________ Diastolic: __________ Diastolic: __________ Diastolic: __________ Diastolic: __________   Pulse: __________ " Pulse: __________ Pulse: __________ Pulse: __________ Pulse: __________ Pulse: __________ Pulse: __________   Morning  2nd read Morning  2nd read Morning  2nd read Morning  2nd read Morning  2nd read Morning  2nd read Morning  2nd read   Systolic: __________ Systolic: __________ Systolic: __________ Systolic: __________ Systolic: __________ Systolic: __________ Systolic: __________   Diastolic: __________ Diastolic: __________ Diastolic: __________ Diastolic: __________ Diastolic: __________ Diastolic: __________ Diastolic: __________   Pulse: __________ Pulse: __________ Pulse: __________ Pulse: __________ Pulse: __________ Pulse: __________ Pulse: __________   Evening  1st read Evening  1st read Evening  1st read Evening  1st read Evening  1st read Evening  1st read Evening  1st read   Systolic: __________ Systolic: __________ Systolic: __________ Systolic: __________ Systolic: __________ Systolic: __________ Systolic: __________   Diastolic: __________ Diastolic: __________ Diastolic: __________ Diastolic: __________ Diastolic: __________ Diastolic: __________ Diastolic: __________   Pulse: __________ Pulse: __________ Pulse: __________ Pulse: __________ Pulse: __________ Pulse: __________ Pulse: __________   Evening  2nd read Evening  2nd read Evening  2nd read Evening  2nd read Evening  2nd read Evening  2nd read Evening  2nd read   Systolic: __________ Systolic: __________ Systolic: __________ Systolic: __________ Systolic: __________ Systolic: __________ Systolic: __________   Diastolic: __________ Diastolic: __________ Diastolic: __________ Diastolic: __________ Diastolic: __________ Diastolic: __________ Diastolic: __________   Pulse: __________ Pulse: __________ Pulse: __________ Pulse: __________ Pulse: __________ Pulse: __________ Pulse: __________   Notes    Notes    Notes    Notes    Notes    Notes    Notes      ____________________ ____________________ ____________________ ____________________  ____________________ ____________________ ____________________   ____________________ ____________________ ____________________ ____________________ ____________________ ____________________ ____________________   ____________________ ____________________ ____________________ ____________________ ____________________ ____________________ ____________________   Patient name: ______________________________     Patient ID: ________________________________    Primary care provider: _______________________    Average BP: _______________________________    Graphic 129873 Version 1.0  Consumer Information Use and Disclaimer   This information is not specific medical advice and does not replace information you receive from your health care provider. This is only a brief summary of general information. It does NOT include all information about conditions, illnesses, injuries, tests, procedures, treatments, therapies, discharge instructions or life-style choices that may apply to you. You must talk with your health care provider for complete information about your health and treatment options. This information should not be used to decide whether or not to accept your health care provider's advice, instructions or recommendations. Only your health care provider has the knowledge and training to provide advice that is right for you. The use of this information is governed by the [employer] End User License Agreement, available at https://www.[employer]/en/solutions/[employer]/about/atitla.The use of [employer] content is governed by the [employer] Terms of Use. ©2021 [employer] All rights reserved.  Copyright   © 2021 UpToDate, Inc. and/or its affiliates. All rights reserved.

## 2022-06-29 LAB
H PYLORI AG STL QL IA: NOT DETECTED
SPECIMEN SOURCE: NORMAL

## 2022-07-05 ENCOUNTER — OFFICE VISIT (OUTPATIENT)
Dept: GASTROENTEROLOGY | Facility: CLINIC | Age: 27
End: 2022-07-05
Payer: COMMERCIAL

## 2022-07-05 ENCOUNTER — TELEPHONE (OUTPATIENT)
Dept: GASTROENTEROLOGY | Facility: CLINIC | Age: 27
End: 2022-07-05

## 2022-07-05 DIAGNOSIS — R14.2 BELCHING: ICD-10-CM

## 2022-07-05 DIAGNOSIS — R12 HEARTBURN: ICD-10-CM

## 2022-07-05 DIAGNOSIS — K21.9 GASTROESOPHAGEAL REFLUX DISEASE, UNSPECIFIED WHETHER ESOPHAGITIS PRESENT: Primary | ICD-10-CM

## 2022-07-05 DIAGNOSIS — R09.A2 GLOBUS SENSATION: ICD-10-CM

## 2022-07-05 DIAGNOSIS — R14.0 BLOATING: ICD-10-CM

## 2022-07-05 DIAGNOSIS — R10.11 RUQ PAIN: ICD-10-CM

## 2022-07-05 PROCEDURE — 1160F PR REVIEW ALL MEDS BY PRESCRIBER/CLIN PHARMACIST DOCUMENTED: ICD-10-PCS | Mod: CPTII,S$GLB,,

## 2022-07-05 PROCEDURE — 3044F PR MOST RECENT HEMOGLOBIN A1C LEVEL <7.0%: ICD-10-PCS | Mod: CPTII,S$GLB,,

## 2022-07-05 PROCEDURE — 99204 OFFICE O/P NEW MOD 45 MIN: CPT | Mod: S$GLB,,,

## 2022-07-05 PROCEDURE — 99999 PR PBB SHADOW E&M-EST. PATIENT-LVL III: CPT | Mod: PBBFAC,,,

## 2022-07-05 PROCEDURE — 99204 PR OFFICE/OUTPT VISIT, NEW, LEVL IV, 45-59 MIN: ICD-10-PCS | Mod: S$GLB,,,

## 2022-07-05 PROCEDURE — 1159F PR MEDICATION LIST DOCUMENTED IN MEDICAL RECORD: ICD-10-PCS | Mod: CPTII,S$GLB,,

## 2022-07-05 PROCEDURE — 3044F HG A1C LEVEL LT 7.0%: CPT | Mod: CPTII,S$GLB,,

## 2022-07-05 PROCEDURE — 99999 PR PBB SHADOW E&M-EST. PATIENT-LVL III: ICD-10-PCS | Mod: PBBFAC,,,

## 2022-07-05 PROCEDURE — 1159F MED LIST DOCD IN RCRD: CPT | Mod: CPTII,S$GLB,,

## 2022-07-05 PROCEDURE — 1160F RVW MEDS BY RX/DR IN RCRD: CPT | Mod: CPTII,S$GLB,,

## 2022-07-05 RX ORDER — OMEPRAZOLE 40 MG/1
40 CAPSULE, DELAYED RELEASE ORAL 2 TIMES DAILY
Qty: 180 CAPSULE | Refills: 0 | Status: SHIPPED | OUTPATIENT
Start: 2022-07-05 | End: 2022-10-21 | Stop reason: SDUPTHER

## 2022-07-05 NOTE — PROGRESS NOTES
Subjective:       Patient ID: Chester Red Jr. is a 27 y.o. male There is no height or weight on file to calculate BMI.    Chief Complaint: Gastroesophageal Reflux    This patient is new to me.  Referring Provider: Dr. Armando Mckeon for esophagtitis.       Gastroesophageal Reflux  He complains of abdominal pain (intermittent spontaneous epigastric & RUQ pain that started within the past week days; rated 5/10 currently; feels like a sprain; took tylenol with improvement in pain), belching (occurs after eating), globus sensation, heartburn and water brash. He reports no chest pain, no choking, no coughing, no dysphagia, no early satiety, no hoarse voice, no nausea or no sore throat. This is a chronic problem. The current episode started more than 1 month ago. The problem occurs frequently. The problem has been gradually improving. The heartburn duration is several minutes. The heartburn is located in the substernum. Heartburn pain intensity: varies. The heartburn wakes him from sleep. The heartburn does not limit his activity. The heartburn doesn't change with position. Nothing aggravates the symptoms. Associated symptoms include fatigue. Pertinent negatives include no anemia, melena, muscle weakness or weight loss. Also reports bloating. He has tried an antacid and a PPI (Recently prescribed prilosec 40 mg BID but has not gotten prescription; past treatments: TUMs, veronica seltzer, & carafate) for the symptoms. The treatment provided significant relief. Past procedures do not include an abdominal ultrasound, an EGD, esophageal manometry, esophageal pH monitoring, H. pylori antibody titer or a UGI. Past invasive treatments do not include gastroplasty, gastroplication or reflux surgery.     Review of Systems   Constitutional: Positive for fatigue. Negative for activity change, appetite change, chills, diaphoresis, fever, unexpected weight change and weight loss.   HENT: Negative for hoarse voice, sore throat and  trouble swallowing.    Respiratory: Negative for cough, choking and shortness of breath.    Cardiovascular: Negative for chest pain.   Gastrointestinal: Positive for abdominal pain (intermittent spontaneous epigastric & RUQ pain that started within the past week days; rated 5/10 currently; feels like a sprain; took tylenol with improvement in pain) and heartburn. Negative for abdominal distention, anal bleeding, blood in stool, constipation, diarrhea, dysphagia, melena, nausea, rectal pain and vomiting.   Musculoskeletal: Negative for muscle weakness.       No LMP for male patient.  Past Medical History:   Diagnosis Date    Hypertension      Past Surgical History:   Procedure Laterality Date    ACHILLES TENDON SURGERY       Family History   Problem Relation Age of Onset    Prostate cancer Paternal Grandfather     Colon cancer Neg Hx     Colon polyps Neg Hx     Esophageal cancer Neg Hx     Stomach cancer Neg Hx      Social History     Tobacco Use    Smoking status: Current Some Day Smoker     Types: Vaping with nicotine    Smokeless tobacco: Never Used    Tobacco comment: Cigar   Substance Use Topics    Alcohol use: Not Currently     Comment: stopped a few months ago    Drug use: No     Wt Readings from Last 10 Encounters:   06/22/22 112.5 kg (248 lb 0.3 oz)   03/25/22 113.1 kg (249 lb 5.4 oz)   03/21/22 112.5 kg (248 lb 0.3 oz)   02/16/22 113.4 kg (250 lb)   08/26/19 104.3 kg (230 lb)   03/01/19 95.3 kg (210 lb)   12/07/18 95.3 kg (210 lb)   04/23/18 91.2 kg (201 lb)   09/07/16 100.4 kg (221 lb 5.5 oz)   07/21/16 101.2 kg (223 lb)     Lab Results   Component Value Date    WBC 4.67 03/25/2022    HGB 14.0 03/25/2022    HCT 45.0 03/25/2022    MCV 86 03/25/2022     03/25/2022     CMP  Sodium   Date Value Ref Range Status   03/25/2022 138 136 - 145 mmol/L Final     Potassium   Date Value Ref Range Status   03/25/2022 4.4 3.5 - 5.1 mmol/L Final     Chloride   Date Value Ref Range Status   03/25/2022 100  95 - 110 mmol/L Final     CO2   Date Value Ref Range Status   03/25/2022 30 (H) 23 - 29 mmol/L Final     Glucose   Date Value Ref Range Status   03/25/2022 95 70 - 110 mg/dL Final     BUN   Date Value Ref Range Status   03/25/2022 10 6 - 20 mg/dL Final     Creatinine   Date Value Ref Range Status   03/25/2022 1.0 0.5 - 1.4 mg/dL Final     Calcium   Date Value Ref Range Status   03/25/2022 10.1 8.7 - 10.5 mg/dL Final     Total Protein   Date Value Ref Range Status   03/25/2022 7.7 6.0 - 8.4 g/dL Final     Albumin   Date Value Ref Range Status   03/25/2022 4.0 3.5 - 5.2 g/dL Final     Total Bilirubin   Date Value Ref Range Status   03/25/2022 0.6 0.1 - 1.0 mg/dL Final     Comment:     For infants and newborns, interpretation of results should be based  on gestational age, weight and in agreement with clinical  observations.    Premature Infant recommended reference ranges:  Up to 24 hours.............<8.0 mg/dL  Up to 48 hours............<12.0 mg/dL  3-5 days..................<15.0 mg/dL  6-29 days.................<15.0 mg/dL       Alkaline Phosphatase   Date Value Ref Range Status   03/25/2022 62 55 - 135 U/L Final     AST   Date Value Ref Range Status   03/25/2022 22 10 - 40 U/L Final     ALT   Date Value Ref Range Status   03/25/2022 27 10 - 44 U/L Final     Anion Gap   Date Value Ref Range Status   03/25/2022 8 8 - 16 mmol/L Final     eGFR if    Date Value Ref Range Status   03/25/2022 >60.0 >60 mL/min/1.73 m^2 Final     eGFR if non    Date Value Ref Range Status   03/25/2022 >60.0 >60 mL/min/1.73 m^2 Final     Comment:     Calculation used to obtain the estimated glomerular filtration  rate (eGFR) is the CKD-EPI equation.        Lab Results   Component Value Date    LIPASE 37 03/25/2022     Reviewed prior medical records including radiology report of chest x-ray 02/17/22, visit note with Dr. Mckeon 06/22/22 & endoscopy history (see surgical history).    Objective:      Physical  Exam  Vitals and nursing note reviewed.   Constitutional:       General: He is not in acute distress.     Appearance: Normal appearance. He is obese. He is not ill-appearing.   HENT:      Mouth/Throat:      Lips: Pink.      Mouth: Mucous membranes are moist.   Eyes:      Extraocular Movements: Extraocular movements intact.      Pupils: Pupils are equal, round, and reactive to light.   Cardiovascular:      Rate and Rhythm: Normal rate and regular rhythm.      Heart sounds: Normal heart sounds.   Pulmonary:      Effort: Pulmonary effort is normal. No respiratory distress.      Breath sounds: Normal breath sounds.   Abdominal:      General: Abdomen is protuberant. Bowel sounds are normal. There is no distension or abdominal bruit. There are no signs of injury.      Palpations: Abdomen is soft. There is no shifting dullness, fluid wave, hepatomegaly, splenomegaly or mass.      Tenderness: There is abdominal tenderness in the right upper quadrant. There is no guarding or rebound. Negative signs include Gloria's sign, Rovsing's sign and McBurney's sign.      Hernia: No hernia is present.   Skin:     General: Skin is warm and dry.      Coloration: Skin is not jaundiced or pale.   Neurological:      Mental Status: He is alert and oriented to person, place, and time.   Psychiatric:         Attention and Perception: Attention normal.         Mood and Affect: Mood normal.         Speech: Speech normal.         Behavior: Behavior normal.         Assessment:       1. Gastroesophageal reflux disease, unspecified whether esophagitis present    2. Heartburn    3. RUQ pain    4. Belching    5. Bloating    6. Globus sensation        Plan:       Gastroesophageal reflux disease, unspecified whether esophagitis present & Heartburn  - schedule EGD, discussed procedure with patient, including risks and benefits, patient verbalized understanding  -discussed about the different types of medications used to treat reflux and how to use them,  antacids can be used PRN for breakthrough heartburn symptoms by reducing stomach acid that is already produced, H2 blockers work by limiting the amount acid production, & PPI's work to block acid production and are taken daily, patient verbalized understanding.  -Educated patient on lifestyle modifications to help control/reduce reflux/abdominal pain including: avoid large meals, avoid eating within 2-3 hours of bedtime (avoid late night eating & lying down soon after eating), elevate head of bed if nocturnal symptoms are present, smoking cessation (if current smoker), & weight loss (if overweight).   -Educated to avoid known foods which trigger reflux symptoms & to minimize/avoid high-fat foods, chocolate, caffeine, citrus, alcohol, & tomato products.  -Advised to avoid/limit use of NSAID's, since they can cause GI upset, bleeding, and/or ulcers. If needed, take with food.    -Patient requested 90 day supply of omeprazole today because the 30 and 60 day supply were not accepted by insurance  -START:  omeprazole (PRILOSEC) 40 MG capsule; Take 1 capsule (40 mg total) by mouth 2 (two) times a day.  Dispense: 180 capsule; Refill: 0    RUQ pain  -     US Abdomen Limited (LIVER); Future; Expected date: 07/05/2022  -START: omeprazole (PRILOSEC) 40 MG capsule; Take 1 capsule (40 mg total) by mouth 2 (two) times a day.  Dispense: 180 capsule; Refill: 0    Belching & Bloating  - schedule EGD, discussed procedure with patient, including risks and benefits, patient verbalized understanding  -testing for H. Pylori typically performed during EGD (if not can do lab testing), patient verbalized understanding and agreed with management plan  - recommend OTC simethicone as directed, such as Phazyme or Gas-x  - low gas diet: Reduce or eliminate these foods from your diet: Broccoli, Cauliflower, Bono sprouts, Cabbage, Cooked dried beans, Carbonated beverages (sparkling water, soda, beer, champagne)  Other Causes Of Excess Gas  Include:   1) EATING TOO FAST or TALKING WHILE YOU CHEW may cause you to swallow air. This increases the amount of gas in the stomach and may worsen your symptoms.  --> Chew each mouthful completely before swallowing. Take your time.  2) OVEREATING may increase the feeling of being bloated and cause more gas.  --> When you are full, stop eating.  3) CONSTIPATION can increase the amount of normal intestinal gas.  --> Avoid constipation by increasing the amount of fiber in your diet by including whole cereal grains, fresh vegetables (except those in the above list) and fresh fruits. High-fiber foods absorb water and carry it out of the body. When increasing the amount of fiber in your diet, you also need to increase the amount of water that you drink. You should drink at least eight 8-ounce glasses of water (two quarts) per day.    Globus sensation  - schedule EGD, discussed procedure with patient, including risks and benefits, patient verbalized understanding  -Consider ENT referral pending results    Follow up in about 4 weeks (around 8/2/2022), or if symptoms worsen or fail to improve.      If no improvement in symptoms or symptoms worsen, call/follow-up at clinic or go to ER.        45 minutes of total time spent on the encounter, which includes face to face time and non-face to face time preparing to see the patient (eg, review of tests), Obtaining and/or reviewing separately obtained history, Documenting clinical information in the electronic or other health record, Independently interpreting results (not separately reported) and communicating results to the patient/family/caregiver, or Care coordination (not separately reported).

## 2022-07-05 NOTE — TELEPHONE ENCOUNTER
----- Message from Shahzad Roman sent at 7/5/2022  9:38 AM CDT -----  Contact: pt [phone]  Type: Needs Medical Advice  Who Called:  pt   Pharmacy name and phone #:   [id number] - [place] - [address]  [facility] [address]  Phone: [phone] Fax: [phone]      Best Call Back Number:[phone]    Additional Information: pt insurance will not cover the 90 day supply please call back to advise

## 2022-07-05 NOTE — TELEPHONE ENCOUNTER
Returned call no answer left message for patient spoke with the pharmacy and will send PA for BID dosing.

## 2022-07-05 NOTE — LETTER
July 5, 2022    Chester Red Jr.  [address]  [place] [address]             [facility]  Gastroenterology  [address]  [facility] [address]  Phone: [phone]   July 5, 2022     Patient: Chester Red Jr.   YOB: 1995   Date of Visit: 7/5/2022       To Whom it May Concern:    Chester Red was seen in my clinic on 7/5/2022.  Please excuse him from any classes or work missed.    If you have any questions or concerns, please don't hesitate to call.    Sincerely,         Denita Mohan, NP

## 2022-07-06 ENCOUNTER — PATIENT MESSAGE (OUTPATIENT)
Dept: GASTROENTEROLOGY | Facility: CLINIC | Age: 27
End: 2022-07-06
Payer: COMMERCIAL

## 2022-07-08 ENCOUNTER — HOSPITAL ENCOUNTER (OUTPATIENT)
Dept: RADIOLOGY | Facility: HOSPITAL | Age: 27
Discharge: HOME | End: 2022-07-08
Payer: COMMERCIAL

## 2022-07-08 DIAGNOSIS — K80.20 GALLSTONES: Primary | ICD-10-CM

## 2022-07-08 DIAGNOSIS — R10.11 RUQ PAIN: ICD-10-CM

## 2022-07-08 PROCEDURE — 76705 US ABDOMEN LIMITED: ICD-10-PCS | Mod: 26,,, | Performed by: RADIOLOGY

## 2022-07-08 PROCEDURE — 76705 ECHO EXAM OF ABDOMEN: CPT | Mod: TC

## 2022-07-08 PROCEDURE — 76705 ECHO EXAM OF ABDOMEN: CPT | Mod: 26,,, | Performed by: RADIOLOGY

## 2022-07-11 ENCOUNTER — TELEPHONE (OUTPATIENT)
Dept: CARDIOLOGY | Facility: HOSPITAL | Age: 27
End: 2022-07-11

## 2022-07-12 ENCOUNTER — TELEPHONE (OUTPATIENT)
Dept: SURGERY | Facility: CLINIC | Age: 27
End: 2022-07-12
Payer: COMMERCIAL

## 2022-07-13 ENCOUNTER — OFFICE VISIT (OUTPATIENT)
Dept: SURGERY | Facility: CLINIC | Age: 27
End: 2022-07-13
Payer: COMMERCIAL

## 2022-07-13 VITALS — HEART RATE: 72 BPM | TEMPERATURE: 97 F | SYSTOLIC BLOOD PRESSURE: 142 MMHG | DIASTOLIC BLOOD PRESSURE: 97 MMHG

## 2022-07-13 DIAGNOSIS — K80.20 GALLSTONES: ICD-10-CM

## 2022-07-13 DIAGNOSIS — R10.11 RUQ PAIN: ICD-10-CM

## 2022-07-13 PROCEDURE — 3077F PR MOST RECENT SYSTOLIC BLOOD PRESSURE >= 140 MM HG: ICD-10-PCS | Mod: CPTII,S$GLB,, | Performed by: SURGERY

## 2022-07-13 PROCEDURE — 3044F HG A1C LEVEL LT 7.0%: CPT | Mod: CPTII,S$GLB,, | Performed by: SURGERY

## 2022-07-13 PROCEDURE — 3044F PR MOST RECENT HEMOGLOBIN A1C LEVEL <7.0%: ICD-10-PCS | Mod: CPTII,S$GLB,, | Performed by: SURGERY

## 2022-07-13 PROCEDURE — 3080F PR MOST RECENT DIASTOLIC BLOOD PRESSURE >= 90 MM HG: ICD-10-PCS | Mod: CPTII,S$GLB,, | Performed by: SURGERY

## 2022-07-13 PROCEDURE — 99203 PR OFFICE/OUTPT VISIT, NEW, LEVL III, 30-44 MIN: ICD-10-PCS | Mod: S$GLB,,, | Performed by: SURGERY

## 2022-07-13 PROCEDURE — 99203 OFFICE O/P NEW LOW 30 MIN: CPT | Mod: S$GLB,,, | Performed by: SURGERY

## 2022-07-13 PROCEDURE — 3080F DIAST BP >= 90 MM HG: CPT | Mod: CPTII,S$GLB,, | Performed by: SURGERY

## 2022-07-13 PROCEDURE — 3077F SYST BP >= 140 MM HG: CPT | Mod: CPTII,S$GLB,, | Performed by: SURGERY

## 2022-07-13 NOTE — H&P
GENERAL SURGERY  OUTPATIENT H&P    REASON FOR VISIT/CC: gallstones    HPI: Chester Red Jr. is a 27 y.o. male referred for evaluation of gallstones and upper abdominal discomfort.  Patient reports majority of his symptoms seem to be related to belching and bloating in the epigastric region.  Occasional have pain in the right upper abdomen but also sometimes in the left upper abdomen.  Some symptoms occur after eating such as belching and bloating.  Occasionally have heartburn and water brash type symptoms.  He reports no dysphagia.  This has been ongoing for over a month.  Has been prescribed PPI but not on.  Has tried other treatments such as Tums, Sara-[place] and Carafate which does provide some relief.  Was seen by GI who arranged for him to have an EGD but also scheduled abdominal ultrasound due to occasional right upper quadrant abdominal pain.  This showed gallstones and was referred to General surgery.    I have reviewed the patient's chart including prior progress notes, procedures and testing.     ROS:   Review of Systems   Constitutional: Negative for activity change, chills and fever.   HENT: Negative for trouble swallowing.    Respiratory: Negative for apnea, chest tightness and shortness of breath.    Cardiovascular: Negative for chest pain, palpitations and leg swelling.   Gastrointestinal: Positive for abdominal pain. Negative for abdominal distention, nausea and vomiting.   Genitourinary: Negative for difficulty urinating, dysuria and hematuria.   Musculoskeletal: Negative for arthralgias, neck pain and neck stiffness.   Skin: Negative for color change and wound.   Neurological: Negative for dizziness, weakness and light-headedness.       PROBLEM LIST:  Patient Active Problem List   Diagnosis    Tobacco use         HISTORY  Past Medical History:   Diagnosis Date    Hypertension        Past Surgical History:   Procedure Laterality Date    ACHILLES TENDON SURGERY         Social History      Tobacco Use    Smoking status: Current Some Day Smoker     Types: Vaping with nicotine    Smokeless tobacco: Never Used    Tobacco comment: Cigar   Substance Use Topics    Alcohol use: Not Currently     Comment: stopped a few months ago    Drug use: No       Family History   Problem Relation Age of Onset    Prostate cancer Paternal Grandfather     Colon cancer Neg Hx     Colon polyps Neg Hx     Esophageal cancer Neg Hx     Stomach cancer Neg Hx          MEDS:  Current Outpatient Medications on File Prior to Visit   Medication Sig Dispense Refill    acetaminophen (TYLENOL) 325 MG tablet Take 325 mg by mouth every 6 (six) hours as needed for Pain.      omeprazole (PRILOSEC) 40 MG capsule Take 1 capsule (40 mg total) by mouth 2 (two) times a day. 180 capsule 0    sucralfate (CARAFATE) 1 gram tablet TAKE 1 TABLET(1 GRAM) BY MOUTH FOUR TIMES DAILY FOR 7 DAYS 28 tablet 0     No current facility-administered medications on file prior to visit.       ALLERGIES:  Review of patient's allergies indicates:  No Known Allergies      VITALS:  There were no vitals filed for this visit.      PHYSICAL EXAM:  Physical Exam  Vitals reviewed.   Constitutional:       General: He is not in acute distress.     Appearance: Normal appearance. He is well-developed.   HENT:      Head: Normocephalic and atraumatic.   Eyes:      General: No scleral icterus.  Neck:      Trachea: No tracheal deviation.   Cardiovascular:      Rate and Rhythm: Normal rate and regular rhythm.      Pulses: Normal pulses.   Pulmonary:      Effort: Pulmonary effort is normal. No respiratory distress.      Breath sounds: Normal breath sounds.   Abdominal:      General: There is no distension.      Palpations: Abdomen is soft.      Tenderness: There is no abdominal tenderness.   Musculoskeletal:         General: No swelling or tenderness. Normal range of motion.      Cervical back: Normal range of motion and neck supple. No rigidity.   Skin:     General:  Skin is warm and dry.      Coloration: Skin is not jaundiced.      Findings: No erythema.   Neurological:      General: No focal deficit present.      Mental Status: He is alert and oriented to person, place, and time. He is not disoriented.      Motor: No weakness or abnormal muscle tone.   Psychiatric:         Mood and Affect: Mood normal.         Behavior: Behavior normal.         Thought Content: Thought content normal.         Judgment: Judgment normal.           LABS:  Lab Results   Component Value Date    WBC 4.67 03/25/2022    RBC 5.25 03/25/2022    HGB 14.0 03/25/2022    HCT 45.0 03/25/2022     03/25/2022     Lab Results   Component Value Date    GLU 95 03/25/2022     03/25/2022    K 4.4 03/25/2022     03/25/2022    CO2 30 (H) 03/25/2022    BUN 10 03/25/2022    CREATININE 1.0 03/25/2022    CALCIUM 10.1 03/25/2022     Lab Results   Component Value Date    ALT 27 03/25/2022    AST 22 03/25/2022    ALKPHOS 62 03/25/2022    BILITOT 0.6 03/25/2022     No results found for: MG, PHOS    STUDIES:  Images and reports were personally reviewed.  Abd US  FINDINGS:  Liver: Normal in size, measuring 16.4 cm. Homogeneous echotexture. No focal hepatic lesions.     Gallbladder: A single gallstone is identified.  Thickening or edema of the gallbladder wall is not seen.     Biliary system: The common duct is not dilated, measuring 2.4 mm.  No intrahepatic ductal dilatation.     Spleen: Normal in size and echotexture, measuring 11.1 cm.     Miscellaneous: No upper abdominal ascites.     Impression:     Cholelithiasis.      ASSESSMENT & PLAN:  27 y.o. male with upper abdominal pain, bloating belching, gallstones  - uncertain if gallstones etiology of his symptoms, the possible it could be contributing to some of his right upper quadrant discomfort I am uncertain if the majority of his symptoms would improve if we removed his gallbladder  - agree with EGD which is scheduled to evaluate for peptic ulcer  disease/gastritis, H pylori  - if EGD is negative or if symptoms fail to improve with PPI and Carafate we can consider the patient for cholecystectomy  - I did discuss the procedure for laparoscopic cholecystectomy as well as associated risk of pain, bleeding, scarring, infection, retained stone, bile leak, injury to other organs, injury to common bile duct, etc.  - patient expressed understanding of the above plan

## 2022-07-15 ENCOUNTER — TELEPHONE (OUTPATIENT)
Dept: CARDIOLOGY | Facility: HOSPITAL | Age: 27
End: 2022-07-15

## 2022-07-18 ENCOUNTER — CLINICAL SUPPORT (OUTPATIENT)
Dept: CARDIOLOGY | Facility: HOSPITAL | Age: 27
End: 2022-07-18
Attending: STUDENT IN AN ORGANIZED HEALTH CARE EDUCATION/TRAINING PROGRAM
Payer: COMMERCIAL

## 2022-07-18 DIAGNOSIS — R55 VASOVAGAL SYNCOPE: ICD-10-CM

## 2022-07-18 PROCEDURE — 93225 XTRNL ECG REC<48 HRS REC: CPT

## 2022-07-18 PROCEDURE — 93227 HOLTER MONITOR - 48 HOUR (CUPID ONLY): ICD-10-PCS | Mod: ,,, | Performed by: INTERNAL MEDICINE

## 2022-07-18 PROCEDURE — 93227 XTRNL ECG REC<48 HR R&I: CPT | Mod: ,,, | Performed by: INTERNAL MEDICINE

## 2022-07-25 ENCOUNTER — HOSPITAL ENCOUNTER (EMERGENCY)
Facility: HOSPITAL | Age: 27
Discharge: HOME | End: 2022-07-25
Attending: EMERGENCY MEDICINE
Payer: COMMERCIAL

## 2022-07-25 VITALS
OXYGEN SATURATION: 99 % | SYSTOLIC BLOOD PRESSURE: 148 MMHG | HEART RATE: 76 BPM | DIASTOLIC BLOOD PRESSURE: 82 MMHG | TEMPERATURE: 99 F | RESPIRATION RATE: 18 BRPM | WEIGHT: 230 LBS | HEIGHT: 71 IN | BODY MASS INDEX: 32.2 KG/M2

## 2022-07-25 DIAGNOSIS — H10.10 ALLERGIC CONJUNCTIVITIS, UNSPECIFIED LATERALITY: Primary | ICD-10-CM

## 2022-07-25 PROCEDURE — 99283 EMERGENCY DEPT VISIT LOW MDM: CPT

## 2022-07-25 RX ORDER — OLOPATADINE HYDROCHLORIDE 1 MG/ML
1 SOLUTION/ DROPS OPHTHALMIC 2 TIMES DAILY
Qty: 5 ML | Refills: 0 | Status: SHIPPED | OUTPATIENT
Start: 2022-07-25 | End: 2022-11-15

## 2022-07-25 NOTE — ED PROVIDER NOTES
Encounter Date: 7/25/2022    SCRIBE #1 NOTE: I, Fadumo Ewing, am scribing for, and in the presence of, Errol Malik III, MD.       History     Chief Complaint   Patient presents with    Eye Problem     Left eye redness draining x 3 days      Time seen by provider: 11:35 AM on 07/25/2022    Chester Red Jr. is a 27 y.o. male who presents to the ED with left eye redness and itching that began 3 days ago. Pt denies a foreign body sensation in his eye and wearing contacts. Pt reports right eye is normal. 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 The patient denies fever, SOB, or any other symptoms at this time. PMHx of HTN. PSHx of achilles tendon surgery.     The history is provided by the patient.     Review of patient's allergies indicates:  No Known Allergies  Past Medical History:   Diagnosis Date    Hypertension      Past Surgical History:   Procedure Laterality Date    ACHILLES TENDON SURGERY       Family History   Problem Relation Age of Onset    Prostate cancer Paternal Grandfather     Colon cancer Neg Hx     Colon polyps Neg Hx     Esophageal cancer Neg Hx     Stomach cancer Neg Hx      Social History     Tobacco Use    Smoking status: Current Some Day Smoker     Types: Vaping with nicotine    Smokeless tobacco: Never Used    Tobacco comment: Cigar   Substance Use Topics    Alcohol use: Not Currently     Comment: stopped a few months ago    Drug use: No     Review of Systems   Constitutional: Negative for fever.   Eyes: Positive for redness and itching.   Respiratory: Negative for shortness of breath.    Genitourinary: Negative for flank pain.   Musculoskeletal: Negative for gait problem.   Neurological: Negative for weakness.   Psychiatric/Behavioral: Negative for confusion.       Physical Exam     Initial Vitals [07/25/22 1119]   BP Pulse Resp Temp SpO2   (!) 157/88 72 16 98.6 °F (37 °C) 99 %      MAP       --         Physical Exam    Nursing note and vitals reviewed.  Constitutional: He appears  well-developed and well-nourished.   HENT:   Head: Normocephalic and atraumatic.   Eyes: Left conjunctiva is injected.   Slit lamp exam:       The left eye shows no hyphema.   No hyphema.    Neck: Neck supple.   Normal range of motion.  Cardiovascular: Normal rate, regular rhythm and normal heart sounds. Exam reveals no gallop and no friction rub.    No murmur heard.  Pulmonary/Chest: Breath sounds normal. No respiratory distress. He has no wheezes. He has no rhonchi. He has no rales.   Abdominal: Abdomen is soft. He exhibits no distension. There is no abdominal tenderness.   Musculoskeletal:         General: Normal range of motion.      Cervical back: Normal range of motion and neck supple.     Neurological: He is alert and oriented to person, place, and time.   Skin: Skin is warm and dry.   Psychiatric: He has a normal mood and affect.         ED Course   Procedures  Labs Reviewed - No data to display       Imaging Results    None          Medications - No data to display  Medical Decision Making:   History:   Old Medical Records: I decided to obtain old medical records.  ED Management:  27-year-old male presents with a 3 day history of left eye redness with associated itching.  Contralateral eye is unaffected.  He has no pain or discharge.  Allergic conjunctivitis is most likely.       APC / Resident Notes:   I, Dr. Errol Malik III, personally performed the services described in this documentation. All medical record entries made by the scribe were at my direction and in my presence.  I have reviewed the chart and agree that the record reflects my personal performance and is accurate and complete     Scribe Attestation:   Scribe #1: I performed the above scribed service and the documentation accurately describes the services I performed. I attest to the accuracy of the note.                 Clinical Impression:   Final diagnoses:  [H10.10] Allergic conjunctivitis, unspecified laterality (Primary)          ED  Disposition Condition    Discharge Stable        ED Prescriptions     Medication Sig Dispense Start Date End Date Auth. Provider    olopatadine (PATANOL) 0.1 % ophthalmic solution Place 1 drop into both eyes 2 (two) times daily. 5 mL 7/25/2022 7/25/2023 Errol Malik III, MD        Follow-up Information     Follow up With Specialties Details Why Contact Info    Sarkis Guzman MD Ophthalmology In 2 days  [address] [facility] [address] [phone]             Errol Malik III, MD  07/25/22 1559

## 2022-07-25 NOTE — Clinical Note
"Chester"Bronwyn Red was seen and treated in our emergency department on 7/25/2022.  He may return to work on 07/25/2022.       If you have any questions or concerns, please don't hesitate to call.      JIM Mart RN    "

## 2022-07-28 LAB
OHS CV EVENT MONITOR DAY: 2
OHS CV HOLTER LENGTH DECIMAL HOURS: 96
OHS CV HOLTER LENGTH HOURS: 48
OHS CV HOLTER LENGTH MINUTES: 0
OHS CV HOLTER SINUS AVERAGE HR: 73
OHS CV HOLTER SINUS MAX HR: 137
OHS CV HOLTER SINUS MIN HR: 45

## 2022-08-01 ENCOUNTER — HOSPITAL ENCOUNTER (OUTPATIENT)
Facility: HOSPITAL | Age: 27
Discharge: HOME | End: 2022-08-01
Attending: INTERNAL MEDICINE | Admitting: INTERNAL MEDICINE
Payer: COMMERCIAL

## 2022-08-01 ENCOUNTER — ANESTHESIA EVENT (OUTPATIENT)
Dept: ENDOSCOPY | Facility: HOSPITAL | Age: 27
End: 2022-08-01
Payer: COMMERCIAL

## 2022-08-01 ENCOUNTER — ANESTHESIA (OUTPATIENT)
Dept: ENDOSCOPY | Facility: HOSPITAL | Age: 27
End: 2022-08-01
Payer: COMMERCIAL

## 2022-08-01 DIAGNOSIS — R12 HEARTBURN: Primary | ICD-10-CM

## 2022-08-01 PROCEDURE — D9220A PRA ANESTHESIA: Mod: ANES,,, | Performed by: ANESTHESIOLOGY

## 2022-08-01 PROCEDURE — 37000009 HC ANESTHESIA EA ADD 15 MINS: Performed by: INTERNAL MEDICINE

## 2022-08-01 PROCEDURE — 88342 CHG IMMUNOCYTOCHEMISTRY: ICD-10-PCS | Mod: 26,,, | Performed by: STUDENT IN AN ORGANIZED HEALTH CARE EDUCATION/TRAINING PROGRAM

## 2022-08-01 PROCEDURE — 88342 IMHCHEM/IMCYTCHM 1ST ANTB: CPT | Mod: 26,,, | Performed by: STUDENT IN AN ORGANIZED HEALTH CARE EDUCATION/TRAINING PROGRAM

## 2022-08-01 PROCEDURE — 27201012 HC FORCEPS, HOT/COLD, DISP: Performed by: INTERNAL MEDICINE

## 2022-08-01 PROCEDURE — 43239 EGD BIOPSY SINGLE/MULTIPLE: CPT | Mod: 59,,, | Performed by: INTERNAL MEDICINE

## 2022-08-01 PROCEDURE — 43239 EGD BIOPSY SINGLE/MULTIPLE: CPT | Mod: 59 | Performed by: INTERNAL MEDICINE

## 2022-08-01 PROCEDURE — 25000003 PHARM REV CODE 250: Performed by: INTERNAL MEDICINE

## 2022-08-01 PROCEDURE — D9220A PRA ANESTHESIA: ICD-10-PCS | Mod: ANES,,, | Performed by: ANESTHESIOLOGY

## 2022-08-01 PROCEDURE — 43239 PR EGD, FLEX, W/BIOPSY, SGL/MULTI: ICD-10-PCS | Mod: 59,,, | Performed by: INTERNAL MEDICINE

## 2022-08-01 PROCEDURE — 43248 PR EGD, FLEX, W/DILATION OVER GUIDEWIRE: ICD-10-PCS | Mod: ,,, | Performed by: INTERNAL MEDICINE

## 2022-08-01 PROCEDURE — D9220A PRA ANESTHESIA: Mod: CRNA,,, | Performed by: NURSE ANESTHETIST, CERTIFIED REGISTERED

## 2022-08-01 PROCEDURE — 88305 TISSUE EXAM BY PATHOLOGIST: CPT | Mod: 59 | Performed by: STUDENT IN AN ORGANIZED HEALTH CARE EDUCATION/TRAINING PROGRAM

## 2022-08-01 PROCEDURE — 43248 EGD GUIDE WIRE INSERTION: CPT | Performed by: INTERNAL MEDICINE

## 2022-08-01 PROCEDURE — C1769 GUIDE WIRE: HCPCS | Performed by: INTERNAL MEDICINE

## 2022-08-01 PROCEDURE — 25000003 PHARM REV CODE 250: Performed by: NURSE ANESTHETIST, CERTIFIED REGISTERED

## 2022-08-01 PROCEDURE — 63600175 PHARM REV CODE 636 W HCPCS: Performed by: NURSE ANESTHETIST, CERTIFIED REGISTERED

## 2022-08-01 PROCEDURE — 88305 TISSUE EXAM BY PATHOLOGIST: CPT | Mod: 26,,, | Performed by: STUDENT IN AN ORGANIZED HEALTH CARE EDUCATION/TRAINING PROGRAM

## 2022-08-01 PROCEDURE — 43248 EGD GUIDE WIRE INSERTION: CPT | Mod: ,,, | Performed by: INTERNAL MEDICINE

## 2022-08-01 PROCEDURE — 88342 IMHCHEM/IMCYTCHM 1ST ANTB: CPT | Performed by: STUDENT IN AN ORGANIZED HEALTH CARE EDUCATION/TRAINING PROGRAM

## 2022-08-01 PROCEDURE — D9220A PRA ANESTHESIA: ICD-10-PCS | Mod: CRNA,,, | Performed by: NURSE ANESTHETIST, CERTIFIED REGISTERED

## 2022-08-01 PROCEDURE — 37000008 HC ANESTHESIA 1ST 15 MINUTES: Performed by: INTERNAL MEDICINE

## 2022-08-01 PROCEDURE — 88305 TISSUE EXAM BY PATHOLOGIST: ICD-10-PCS | Mod: 26,,, | Performed by: STUDENT IN AN ORGANIZED HEALTH CARE EDUCATION/TRAINING PROGRAM

## 2022-08-01 RX ORDER — PROPOFOL 10 MG/ML
VIAL (ML) INTRAVENOUS
Status: DISCONTINUED | OUTPATIENT
Start: 2022-08-01 | End: 2022-08-01

## 2022-08-01 RX ORDER — FENTANYL CITRATE 50 UG/ML
INJECTION, SOLUTION INTRAMUSCULAR; INTRAVENOUS
Status: DISCONTINUED | OUTPATIENT
Start: 2022-08-01 | End: 2022-08-01

## 2022-08-01 RX ORDER — SODIUM CHLORIDE 9 MG/ML
INJECTION, SOLUTION INTRAVENOUS CONTINUOUS
Status: DISCONTINUED | OUTPATIENT
Start: 2022-08-01 | End: 2022-08-01 | Stop reason: HOSPADM

## 2022-08-01 RX ORDER — LIDOCAINE HYDROCHLORIDE 20 MG/ML
INJECTION INTRAVENOUS
Status: DISCONTINUED | OUTPATIENT
Start: 2022-08-01 | End: 2022-08-01

## 2022-08-01 RX ADMIN — FENTANYL CITRATE 50 MCG: 50 INJECTION, SOLUTION INTRAMUSCULAR; INTRAVENOUS at 01:08

## 2022-08-01 RX ADMIN — PROPOFOL 50 MG: 10 INJECTION, EMULSION INTRAVENOUS at 01:08

## 2022-08-01 RX ADMIN — PROPOFOL 100 MG: 10 INJECTION, EMULSION INTRAVENOUS at 01:08

## 2022-08-01 RX ADMIN — LIDOCAINE HYDROCHLORIDE 200 MG: 20 INJECTION, SOLUTION INTRAVENOUS at 01:08

## 2022-08-01 RX ADMIN — SODIUM CHLORIDE: 0.9 INJECTION, SOLUTION INTRAVENOUS at 10:08

## 2022-08-01 RX ADMIN — PROPOFOL 150 MG: 10 INJECTION, EMULSION INTRAVENOUS at 01:08

## 2022-08-01 NOTE — H&P
History & Physical - Short Stay  Gastroenterology    SUBJECTIVE:     Procedure: EGD    Chief Complaint/Indication for Procedure: Heartburn    PTA Medications   Medication Sig    acetaminophen (TYLENOL) 325 MG tablet Take 325 mg by mouth every 6 (six) hours as needed for Pain.    olopatadine (PATANOL) 0.1 % ophthalmic solution Place 1 drop into both eyes 2 (two) times daily.    omeprazole (PRILOSEC) 40 MG capsule Take 1 capsule (40 mg total) by mouth 2 (two) times a day.    sucralfate (CARAFATE) 1 gram tablet TAKE 1 TABLET(1 GRAM) BY MOUTH FOUR TIMES DAILY FOR 7 DAYS     Review of patient's allergies indicates:  No Known Allergies     Past Medical History:   Diagnosis Date    Acid reflux     Hypertension      Past Surgical History:   Procedure Laterality Date    ACHILLES TENDON SURGERY       Family History   Problem Relation Age of Onset    Prostate cancer Paternal Grandfather     Colon cancer Neg Hx     Colon polyps Neg Hx     Esophageal cancer Neg Hx     Stomach cancer Neg Hx      Social History     Tobacco Use    Smoking status: Current Every Day Smoker     Types: Vaping with nicotine    Smokeless tobacco: Never Used    Tobacco comment: Cigar   Substance Use Topics    Alcohol use: Not Currently     Comment: stopped a few months ago    Drug use: No     OBJECTIVE:     Vital Signs (Most Recent)  Temp: 98.2 °F (36.8 °C) (08/01/22 1029)  Pulse: 67 (08/01/22 1029)  Resp: 16 (08/01/22 1029)  BP: 138/84 (08/01/22 1029)  SpO2: 96 % (08/01/22 1029)    Physical Exam:                                                       GENERAL:  Comfortable, in no acute distress.                       HEENT EXAM:  Nonicteric.  No adenopathy.  Oropharynx is clear.               NECK:  Supple.                                                               LUNGS:  Clear.                                                               CARDIAC:  Regular rate and rhythm.  S1, S2.  No murmur.  ABDOMEN:  Soft, positive bowel sounds,  nontender.  No hepatosplenomegaly or masses.  No rebound or guarding.                                             EXTREMITIES:  No edema.     MENTAL STATUS:  Normal, alert and oriented.    ASSESSMENT/PLAN:     Assessment: Heartburn    Plan: EGD

## 2022-08-01 NOTE — ANESTHESIA POSTPROCEDURE EVALUATION
Anesthesia Post Evaluation    Patient: Cehster Red Jr.    Procedure(s) Performed: Procedure(s) (LRB):  EGD (ESOPHAGOGASTRODUODENOSCOPY) (N/A)    Final Anesthesia Type: general      Patient location during evaluation: PACU  Patient participation: Yes- Able to Participate  Level of consciousness: awake and alert and oriented  Post-procedure vital signs: reviewed and stable  Pain management: adequate  Airway patency: patent    PONV status at discharge: No PONV  Anesthetic complications: no      Cardiovascular status: blood pressure returned to baseline  Respiratory status: unassisted, spontaneous ventilation and room air  Hydration status: euvolemic  Follow-up not needed.          Vitals Value Taken Time   /84 08/01/22 1415   Temp  08/01/22 1437   Pulse 65 08/01/22 1415   Resp 19 08/01/22 1415   SpO2 95 % 08/01/22 1415         Event Time   Out of Recovery 14:26:00         Pain/Niru Score: Niru Score: 10 (8/1/2022  2:20 PM)

## 2022-08-01 NOTE — TRANSFER OF CARE
"Anesthesia Transfer of Care Note    Patient: Chester Red Jr.    Procedure(s) Performed: Procedure(s) (LRB):  EGD (ESOPHAGOGASTRODUODENOSCOPY) (N/A)    Patient location: PACU    Anesthesia Type: general    Transport from OR: Transported from OR on room air with adequate spontaneous ventilation    Post pain: adequate analgesia    Post assessment: no apparent anesthetic complications    Post vital signs: stable    Level of consciousness: awake    Nausea/Vomiting: no nausea/vomiting    Complications: none    Transfer of care protocol was followed      Last vitals:   Visit Vitals  /84 (BP Location: Left arm, Patient Position: Lying)   Pulse 67   Temp 36.8 °C (98.2 °F) (Skin)   Resp 16   Ht 5' 11" (1.803 m)   Wt 108.9 kg (240 lb)   SpO2 96%   BMI 33.47 kg/m²     "

## 2022-08-01 NOTE — PROVATION PATIENT INSTRUCTIONS
Discharge Summary/Instructions after an Endoscopic Procedure  Patient Name: Chester Red  Patient MRN: [id number]  Patient YOB: 1995  Monday, August 1, 2022  Bubba Lu MD  Dear patient,  As a result of recent federal legislation (The Federal Cures Act), you may   receive lab or pathology results from your procedure in your [facility]   account before your physician is able to contact you. Your physician or   their representative will relay the results to you with their   recommendations at their soonest availability.  Thank you,  RESTRICTIONS:  During your procedure today, you received medications for sedation.  These   medications may affect your judgment, balance and coordination.  Therefore,   for 24 hours, you have the following restrictions:   - DO NOT drive a car, operate machinery, make legal/financial decisions,   sign important papers or drink alcohol.    ACTIVITY:  Today: no heavy lifting, straining or running due to procedural   sedation/anesthesia.  The following day: return to full activity including work.  DIET:  Eat and drink normally unless instructed otherwise.     TREATMENT FOR COMMON SIDE EFFECTS:  - Mild abdominal pain, nausea, belching, bloating or excessive gas:  rest,   eat lightly and use a heating pad.  - Sore Throat: treat with throat lozenges and/or gargle with warm salt   water.  - Because air was used during the procedure, expelling large amounts of air   from your rectum or belching is normal.  - If a bowel prep was taken, you may not have a bowel movement for 1-3 days.    This is normal.  SYMPTOMS TO WATCH FOR AND REPORT TO YOUR PHYSICIAN:  1. Abdominal pain or bloating, other than gas cramps.  2. Chest pain.  3. Back pain.  4. Signs of infection such as: chills or fever occurring within 24 hours   after the procedure.  5. Rectal bleeding, which would show as bright red, maroon, or black stools.   (A tablespoon of blood from the rectum is not serious, especially  if   hemorrhoids are present.)  6. Vomiting.  7. Weakness or dizziness.  GO DIRECTLY TO THE NEAREST EMERGENCY ROOM IF YOU HAVE ANY OF THE FOLLOWING:      Difficulty breathing              Chills and/or fever over 101 F   Persistent vomiting and/or vomiting blood   Severe abdominal pain   Severe chest pain   Black, tarry stools   Bleeding- more than one tablespoon   Any other symptom or condition that you feel may need urgent attention  Your doctor recommends these additional instructions:  If any biopsies were taken, your doctors clinic will contact you in 1 to 2   weeks with any results.  - Discharge patient to home.   - Advance diet as tolerated.   - Continue present medications.   - Await pathology results.  For questions, problems or results please call your physician - Bubba Lu MD at Work:  [phone].  [facility], EMERGENCY ROOM PHONE NUMBER: [phone]  IF A COMPLICATION OR EMERGENCY SITUATION ARISES AND YOU ARE UNABLE TO REACH   YOUR PHYSICIAN - GO DIRECTLY TO THE EMERGENCY ROOM.  Bubba Lu MD  8/1/2022 1:47:20 PM  This report has been verified and signed electronically.  Dear patient,  As a result of recent federal legislation (The Federal Cures Act), you may   receive lab or pathology results from your procedure in your [facility]   account before your physician is able to contact you. Your physician or   their representative will relay the results to you with their   recommendations at their soonest availability.  Thank you,  PROVATION

## 2022-08-01 NOTE — ANESTHESIA PREPROCEDURE EVALUATION
08/01/2022  Chester Red Jr. is a 27 y.o., male.      Pre-op Assessment    I have reviewed the Patient Summary Reports.     I have reviewed the Nursing Notes. I have reviewed the NPO Status.   I have reviewed the Medications.     Review of Systems  Anesthesia Hx:  No problems with previous Anesthesia Denies Hx of Anesthetic complications    Social:  Smoker Smoking Status: Current Every Day Smoker  Smokeless Tobacco Status: Never Used  Alcohol use: Not Currently  Drug use: No       Cardiovascular:   Hypertension Denies MI.  Denies CAD.    Denies CABG/stent.   Denies Angina.    Pulmonary:   Denies COPD.  Denies Asthma.  Denies Recent URI.    Renal/:   Denies Chronic Renal Disease.     Hepatic/GI:   Denies GERD. Denies Liver Disease.    Neurological:   Denies TIA. Denies CVA. Denies Seizures.    Endocrine:   Denies Diabetes. Denies Hypothyroidism.    Psych:   Denies Psychiatric History.          Physical Exam  General: Well nourished, Cooperative, Alert and Oriented    Airway:  Mallampati: II / II  Mouth Opening: Normal  TM Distance: 4 - 6 cm  Tongue: Normal    Dental:  Intact    Chest/Lungs:  Clear to auscultation, Normal Respiratory Rate    Heart:  Rate: Normal  Rhythm: Regular Rhythm  Sounds: Normal        Anesthesia Plan  Type of Anesthesia, risks & benefits discussed:    Anesthesia Type: Gen Natural Airway  Intra-op Monitoring Plan: Standard ASA Monitors  Induction:  IV  Informed Consent: Informed consent signed with the Patient and all parties understand the risks and agree with anesthesia plan.  All questions answered.   ASA Score: 1    Ready For Surgery From Anesthesia Perspective.     .

## 2022-08-02 VITALS
SYSTOLIC BLOOD PRESSURE: 124 MMHG | HEART RATE: 65 BPM | HEIGHT: 71 IN | WEIGHT: 240 LBS | RESPIRATION RATE: 19 BRPM | OXYGEN SATURATION: 95 % | TEMPERATURE: 98 F | BODY MASS INDEX: 33.6 KG/M2 | DIASTOLIC BLOOD PRESSURE: 84 MMHG

## 2022-08-05 LAB
FINAL PATHOLOGIC DIAGNOSIS: NORMAL
GROSS: NORMAL
Lab: NORMAL
SUPPLEMENTAL DIAGNOSIS: NORMAL

## 2022-08-15 ENCOUNTER — OFFICE VISIT (OUTPATIENT)
Dept: SURGERY | Facility: CLINIC | Age: 27
End: 2022-08-15
Payer: COMMERCIAL

## 2022-08-15 VITALS
SYSTOLIC BLOOD PRESSURE: 128 MMHG | TEMPERATURE: 99 F | WEIGHT: 230 LBS | HEIGHT: 71 IN | HEART RATE: 73 BPM | DIASTOLIC BLOOD PRESSURE: 82 MMHG | BODY MASS INDEX: 32.2 KG/M2

## 2022-08-15 DIAGNOSIS — K80.20 GALLSTONES: Primary | ICD-10-CM

## 2022-08-15 DIAGNOSIS — K20.90 ESOPHAGITIS: ICD-10-CM

## 2022-08-15 DIAGNOSIS — K29.70 GASTRITIS WITHOUT BLEEDING, UNSPECIFIED CHRONICITY, UNSPECIFIED GASTRITIS TYPE: ICD-10-CM

## 2022-08-15 PROCEDURE — 3044F HG A1C LEVEL LT 7.0%: CPT | Mod: CPTII,S$GLB,, | Performed by: SURGERY

## 2022-08-15 PROCEDURE — 3044F PR MOST RECENT HEMOGLOBIN A1C LEVEL <7.0%: ICD-10-PCS | Mod: CPTII,S$GLB,, | Performed by: SURGERY

## 2022-08-15 PROCEDURE — 3074F PR MOST RECENT SYSTOLIC BLOOD PRESSURE < 130 MM HG: ICD-10-PCS | Mod: CPTII,S$GLB,, | Performed by: SURGERY

## 2022-08-15 PROCEDURE — 99213 PR OFFICE/OUTPT VISIT, EST, LEVL III, 20-29 MIN: ICD-10-PCS | Mod: S$GLB,,, | Performed by: SURGERY

## 2022-08-15 PROCEDURE — 3079F PR MOST RECENT DIASTOLIC BLOOD PRESSURE 80-89 MM HG: ICD-10-PCS | Mod: CPTII,S$GLB,, | Performed by: SURGERY

## 2022-08-15 PROCEDURE — 3008F BODY MASS INDEX DOCD: CPT | Mod: CPTII,S$GLB,, | Performed by: SURGERY

## 2022-08-15 PROCEDURE — 3074F SYST BP LT 130 MM HG: CPT | Mod: CPTII,S$GLB,, | Performed by: SURGERY

## 2022-08-15 PROCEDURE — 3079F DIAST BP 80-89 MM HG: CPT | Mod: CPTII,S$GLB,, | Performed by: SURGERY

## 2022-08-15 PROCEDURE — 1159F MED LIST DOCD IN RCRD: CPT | Mod: CPTII,S$GLB,, | Performed by: SURGERY

## 2022-08-15 PROCEDURE — 3008F PR BODY MASS INDEX (BMI) DOCUMENTED: ICD-10-PCS | Mod: CPTII,S$GLB,, | Performed by: SURGERY

## 2022-08-15 PROCEDURE — 99213 OFFICE O/P EST LOW 20 MIN: CPT | Mod: S$GLB,,, | Performed by: SURGERY

## 2022-08-15 PROCEDURE — 1159F PR MEDICATION LIST DOCUMENTED IN MEDICAL RECORD: ICD-10-PCS | Mod: CPTII,S$GLB,, | Performed by: SURGERY

## 2022-08-15 NOTE — PROGRESS NOTES
GENERAL SURGERY PROGRESS NOTE    HPI: Chester Red Jr. is a 27 y.o. male here for follow-up after being evaluated for gallstones and epigastric symptoms.  At that time his symptoms were felt more likely be related to peptic ulcer disease or gastritis.  Since undergone EGD.  He is on Prilosec and Carafate which he has been taken for 2-4 weeks.  Is having some improvement though not complete relief.  Denies nausea or vomiting yellowing of the skin or eyes.      VITALS:  Vitals:    08/15/22 1520   BP: 128/82   Pulse: 73   Temp: 98.6 °F (37 °C)       Physical Exam  Vitals reviewed.   Constitutional:       General: He is not in acute distress.     Appearance: Normal appearance. He is well-developed.   HENT:      Head: Normocephalic and atraumatic.   Eyes:      General: No scleral icterus.  Neck:      Trachea: No tracheal deviation.   Cardiovascular:      Rate and Rhythm: Normal rate and regular rhythm.      Pulses: Normal pulses.   Pulmonary:      Effort: Pulmonary effort is normal. No respiratory distress.      Breath sounds: Normal breath sounds.   Abdominal:      General: There is no distension.      Palpations: Abdomen is soft.      Tenderness: There is no abdominal tenderness.   Musculoskeletal:         General: No swelling or tenderness. Normal range of motion.      Cervical back: Normal range of motion and neck supple. No rigidity.   Skin:     General: Skin is warm and dry.      Coloration: Skin is not jaundiced.      Findings: No erythema.   Neurological:      General: No focal deficit present.      Mental Status: He is alert and oriented to person, place, and time. He is not disoriented.      Motor: No weakness or abnormal muscle tone.   Psychiatric:         Mood and Affect: Mood normal.         Behavior: Behavior normal.         Thought Content: Thought content normal.         Judgment: Judgment normal.            EGD  Impression:    - Normal esophagus. Biopsied. Dilated.                          -  Erythematous mucosa in the antrum. Biopsied.                          - Normal duodenal bulb, first portion of the                          duodenum and second portion of the duodenum.                          Biopsied.       PATH  . Duodenum, biopsy:   - Benign duodenal mucosa with patchy mildly increased intraepithelial   lymphocytes (see comment)   - Preserved villous architecture   - Negative for dysplasia or carcinoma   Comment:   These findings are mild and non-specific but raise the possibility of a   number of etiologies, including peptic duodenitis, medication induced injury   (such as nonsteroidal anti-inflammatory drug (NSAID) or angiotensin receptor   blocker use), celiac disease, bacterial overgrowth, and certain immunologic   disorders, among others. Clinical and serological correlation may be of use.   2. Stomach, antrum and body, biopsy:   - Mild chronic inactive gastritis with features suggestive of reactive   gastropathy   - Immunohistochemistry for Helicobacter pylori pending; results to follow in   a supplemental report   - Negative for intestinal metaplasia   - Negative for dysplasia or carcinoma   3. Esophagus, mid, biopsy:   - Chronic esophagitis, compatible with reflux esophagitis   - No significantly increased eosinophils (maximum of 3 per high power field)   - Negative for intestinal metaplasia   - Negative for dysplasia or malignancy     This addendum is being issued to report results of Helicobacter pylori   immunohistochemistry, performed on block 2A (stomach antrum and body) with   appropriate and reactive controls:   Helicobacter pylori (IHC): Negative   The prior diagnosis is unchanged.             ASSESSMENT & PLAN:  27 y.o. male with regurgitation, bloating and belching, upper abdominal pain  - results of EGD and pathology reviewed with the patient, antral gastritis and likely reflux esophagitis present which are likely the source of the majority of his symptoms, he is having some  relief with Carafate and Prilosec I recommend he continue using this medication  - review of images from EGD showed no obvious hiatal hernia  - I discussed with the patient he does have gallstones but whether not these are attributing to his symptoms is uncertain, it is reasonable for him to continue treatment for the gastritis and esophagitis and if his symptoms fail to fully resolve we could consider cholecystitis, patient is in agreement with this plan and will contact the office if he decides to proceed with cholecystectomy

## 2022-08-29 NOTE — ED PROVIDER NOTES
Encounter Date: 4/23/2018       History     Chief Complaint   Patient presents with    Migraine     every other day    Nasal Congestion     cold, stuffy nose and sore throat. Deneis taking medication     This is a 23-year-old male who complains of sore throat, sinus congestion, and cough since yesterday.  He explains his throat burns whenever he swallows but is able to drink and eat, and control his own secretions.  He also reports he has had a sinus infection before.  He explains he has a runny nose and sinus congestion.  He denies fever, shortness of breath, neck pain.  He also reports migraine headaches off and on ×2 months, but currently is not having headache.          Review of patient's allergies indicates:  No Known Allergies  History reviewed. No pertinent past medical history.  Past Surgical History:   Procedure Laterality Date    ACHILLES TENDON SURGERY       Family History   Problem Relation Age of Onset    Family history unknown: Yes     Social History   Substance Use Topics    Smoking status: Current Some Day Smoker    Smokeless tobacco: Never Used      Comment: Cigar    Alcohol use Yes      Comment: occasionally     Review of Systems   Constitutional: Negative for fever.   HENT: Positive for rhinorrhea, sinus pain and sore throat. Negative for voice change.    Respiratory: Negative for shortness of breath.    Cardiovascular: Negative for chest pain.   Gastrointestinal: Negative for nausea.   Genitourinary: Negative for dysuria.   Musculoskeletal: Negative for back pain.   Skin: Negative for rash.   Neurological: Positive for headaches. Negative for weakness.   Hematological: Does not bruise/bleed easily.       Physical Exam     Initial Vitals [04/23/18 1730]   BP Pulse Resp Temp SpO2   (!) 149/94 -- 18 98 °F (36.7 °C) 98 %      MAP       112.33         Physical Exam    Vitals reviewed.  Constitutional: He appears well-developed and well-nourished. He is not diaphoretic. No distress.   HENT:    Head: Normocephalic and atraumatic.   Right Ear: External ear normal.   Left Ear: External ear normal.   Nose: Mucosal edema and rhinorrhea present.   Symmetrically enlarged tonsils with no exudate.   Eyes: Conjunctivae are normal. No scleral icterus.   Neck: Normal range of motion. Neck supple.   Cardiovascular: Normal rate, regular rhythm, normal heart sounds and intact distal pulses.   Pulmonary/Chest: Breath sounds normal. No respiratory distress. He has no wheezes. He has no rhonchi. He has no rales. He exhibits no tenderness.   Musculoskeletal: Normal range of motion.   Lymphadenopathy:     He has cervical adenopathy (nontender).   Neurological: He is alert and oriented to person, place, and time.   Skin: Skin is warm and dry. No rash noted. No erythema.         ED Course   Procedures  Labs Reviewed   THROAT SCREEN, RAPID   CULTURE, STREP A,  THROAT             Medical Decision Making:   Initial Assessment:   23-year-old male complains of sore throat, nasal congestion, and cough times one day.  He presents well-appearing in no distress, afebrile, vital signs within normal limits.  Tonsils symmetrically enlarged.  No evidence of peritonsillar abscess or retropharyngeal abscess.  No sinus tenderness.  Lungs clear with normal work of breathing.  ED Management:  Rapid strep is negative.  Patient treated with NSAIDs and Flonase and instructed to follow-up with PCP for reevaluation.  He was discharged with return precautions.  He verbalized understanding and agreed with plan.                      Clinical Impression:   The primary encounter diagnosis was Sinus congestion. A diagnosis of Pharyngitis, unspecified etiology was also pertinent to this visit.                           Barney Beltran PA-C  04/24/18 0010     [facility] balance

## 2022-09-04 DIAGNOSIS — R10.13 EPIGASTRIC ABDOMINAL PAIN: Primary | ICD-10-CM

## 2022-10-10 ENCOUNTER — OFFICE VISIT (OUTPATIENT)
Dept: FAMILY MEDICINE | Facility: CLINIC | Age: 27
End: 2022-10-10
Payer: COMMERCIAL

## 2022-10-10 VITALS
RESPIRATION RATE: 18 BRPM | SYSTOLIC BLOOD PRESSURE: 142 MMHG | OXYGEN SATURATION: 98 % | HEIGHT: 71 IN | WEIGHT: 236.56 LBS | HEART RATE: 89 BPM | BODY MASS INDEX: 33.12 KG/M2 | DIASTOLIC BLOOD PRESSURE: 90 MMHG | TEMPERATURE: 99 F

## 2022-10-10 DIAGNOSIS — I10 ESSENTIAL HYPERTENSION: Primary | ICD-10-CM

## 2022-10-10 DIAGNOSIS — J02.9 SORE THROAT: ICD-10-CM

## 2022-10-10 PROCEDURE — 1159F MED LIST DOCD IN RCRD: CPT | Mod: CPTII,S$GLB,,

## 2022-10-10 PROCEDURE — 1160F RVW MEDS BY RX/DR IN RCRD: CPT | Mod: CPTII,S$GLB,,

## 2022-10-10 PROCEDURE — 99999 PR PBB SHADOW E&M-EST. PATIENT-LVL IV: ICD-10-PCS | Mod: PBBFAC,,,

## 2022-10-10 PROCEDURE — 1159F PR MEDICATION LIST DOCUMENTED IN MEDICAL RECORD: ICD-10-PCS | Mod: CPTII,S$GLB,,

## 2022-10-10 PROCEDURE — 3077F SYST BP >= 140 MM HG: CPT | Mod: CPTII,S$GLB,,

## 2022-10-10 PROCEDURE — 3080F DIAST BP >= 90 MM HG: CPT | Mod: CPTII,S$GLB,,

## 2022-10-10 PROCEDURE — 3044F PR MOST RECENT HEMOGLOBIN A1C LEVEL <7.0%: ICD-10-PCS | Mod: CPTII,S$GLB,,

## 2022-10-10 PROCEDURE — 3008F PR BODY MASS INDEX (BMI) DOCUMENTED: ICD-10-PCS | Mod: CPTII,S$GLB,,

## 2022-10-10 PROCEDURE — 99214 OFFICE O/P EST MOD 30 MIN: CPT | Mod: S$GLB,,,

## 2022-10-10 PROCEDURE — 3008F BODY MASS INDEX DOCD: CPT | Mod: CPTII,S$GLB,,

## 2022-10-10 PROCEDURE — 1160F PR REVIEW ALL MEDS BY PRESCRIBER/CLIN PHARMACIST DOCUMENTED: ICD-10-PCS | Mod: CPTII,S$GLB,,

## 2022-10-10 PROCEDURE — 99214 PR OFFICE/OUTPT VISIT, EST, LEVL IV, 30-39 MIN: ICD-10-PCS | Mod: S$GLB,,,

## 2022-10-10 PROCEDURE — 99999 PR PBB SHADOW E&M-EST. PATIENT-LVL IV: CPT | Mod: PBBFAC,,,

## 2022-10-10 PROCEDURE — 3044F HG A1C LEVEL LT 7.0%: CPT | Mod: CPTII,S$GLB,,

## 2022-10-10 PROCEDURE — 3080F PR MOST RECENT DIASTOLIC BLOOD PRESSURE >= 90 MM HG: ICD-10-PCS | Mod: CPTII,S$GLB,,

## 2022-10-10 PROCEDURE — 3077F PR MOST RECENT SYSTOLIC BLOOD PRESSURE >= 140 MM HG: ICD-10-PCS | Mod: CPTII,S$GLB,,

## 2022-10-10 RX ORDER — AMLODIPINE BESYLATE 5 MG/1
5 TABLET ORAL DAILY
Qty: 90 TABLET | Refills: 3 | Status: SHIPPED | OUTPATIENT
Start: 2022-10-10 | End: 2023-12-04

## 2022-10-10 RX ORDER — IBUPROFEN 800 MG/1
800 TABLET ORAL 3 TIMES DAILY
COMMUNITY
End: 2023-12-04

## 2022-10-10 NOTE — PROGRESS NOTES
Subjective:       Patient ID: Chester Red Jr. is a 27 y.o. male.    Chief Complaint: Headache and Fatigue    Presents to the clinic w/ concerns for elevated blood pressures. Has mostly been borderline before but in recent weeks has had pressure in 150/100s when checking at [facility], [place]. Elevated at presentation today. Has associated headaches and fatigue.     Also complains of sore throat since last night. Slept with mouth open and AC on. Coughing up red tinged phlegm.       Past Medical History:   Diagnosis Date    Acid reflux     Hypertension        Review of patient's allergies indicates:  No Known Allergies      Current Outpatient Medications:     ibuprofen (ADVIL,MOTRIN) 800 MG tablet, Take 800 mg by mouth 3 (three) times daily., Disp: , Rfl:     acetaminophen (TYLENOL) 325 MG tablet, Take 325 mg by mouth every 6 (six) hours as needed for Pain., Disp: , Rfl:     amLODIPine (NORVASC) 5 MG tablet, Take 1 tablet (5 mg total) by mouth once daily., Disp: 90 tablet, Rfl: 3    olopatadine (PATANOL) 0.1 % ophthalmic solution, Place 1 drop into both eyes 2 (two) times daily. (Patient not taking: Reported on 10/10/2022), Disp: 5 mL, Rfl: 0    omeprazole (PRILOSEC) 40 MG capsule, Take 1 capsule (40 mg total) by mouth 2 (two) times a day., Disp: 180 capsule, Rfl: 0    sucralfate (CARAFATE) 1 gram tablet, Take 1 tablet (1 g total) by mouth 4 (four) times daily before meals and nightly. (Patient not taking: Reported on 10/10/2022), Disp: 360 tablet, Rfl: 0    Review of Systems   Constitutional:  Positive for fatigue. Negative for activity change, appetite change, chills, diaphoresis, fever and unexpected weight change.   HENT:  Positive for sore throat. Negative for congestion, ear pain, postnasal drip, rhinorrhea, sinus pressure, sneezing and trouble swallowing.    Eyes:  Negative for pain, itching and visual disturbance.   Respiratory:  Negative for cough, chest tightness, shortness of breath and wheezing.    Cardiovascular:  " Negative for chest pain, palpitations and leg swelling.   Gastrointestinal:  Negative for abdominal distention, abdominal pain, blood in stool, constipation, diarrhea, nausea and vomiting.   Endocrine: Negative for cold intolerance and heat intolerance.   Genitourinary:  Negative for difficulty urinating, dysuria, frequency, hematuria and urgency.   Musculoskeletal:  Negative for arthralgias, back pain, myalgias and neck pain.   Skin:  Negative for color change, pallor, rash and wound.   Neurological:  Positive for headaches. Negative for dizziness, syncope, weakness and numbness.   Hematological:  Negative for adenopathy.   Psychiatric/Behavioral:  Negative for behavioral problems. The patient is not nervous/anxious.      Objective:      BP (!) 142/90 (BP Location: Left arm, Patient Position: Sitting, BP Method: Large (Manual))   Pulse 89   Temp 98.7 °F (37.1 °C) (Oral)   Resp 18   Ht 5' 11" (1.803 m)   Wt 107.3 kg (236 lb 8.9 oz)   SpO2 98%   BMI 32.99 kg/m²   Physical Exam  Vitals reviewed.   Constitutional:       General: He is not in acute distress.     Appearance: Normal appearance. He is obese. He is not ill-appearing, toxic-appearing or diaphoretic.   HENT:      Head: Normocephalic.      Right Ear: External ear normal.      Left Ear: External ear normal.      Nose: Nose normal. No congestion or rhinorrhea.      Mouth/Throat:      Mouth: Mucous membranes are moist.      Pharynx: Oropharynx is clear. Posterior oropharyngeal erythema present. No oropharyngeal exudate.   Eyes:      General: No scleral icterus.        Right eye: No discharge.         Left eye: No discharge.      Extraocular Movements: Extraocular movements intact.      Conjunctiva/sclera: Conjunctivae normal.   Cardiovascular:      Rate and Rhythm: Normal rate and regular rhythm.      Pulses: Normal pulses.      Heart sounds: Normal heart sounds. No murmur heard.    No friction rub. No gallop.   Pulmonary:      Effort: Pulmonary effort is " normal. No respiratory distress.      Breath sounds: Normal breath sounds. No wheezing, rhonchi or rales.   Chest:      Chest wall: No tenderness.   Abdominal:      General: There is no distension.      Palpations: Abdomen is soft. There is no mass.      Tenderness: There is no abdominal tenderness. There is no guarding.   Musculoskeletal:         General: No swelling, tenderness or deformity. Normal range of motion.      Cervical back: Normal range of motion.      Right lower leg: No edema.      Left lower leg: No edema.   Skin:     General: Skin is warm and dry.      Capillary Refill: Capillary refill takes less than 2 seconds.      Coloration: Skin is not jaundiced.      Findings: No bruising, erythema, lesion or rash.   Neurological:      Mental Status: He is alert and oriented to person, place, and time.      Gait: Gait normal.   Psychiatric:         Mood and Affect: Mood normal.         Behavior: Behavior normal.         Thought Content: Thought content normal.         Judgment: Judgment normal.       Assessment:       1. Essential hypertension    2. Sore throat          Plan:       Essential hypertension  -     amLODIPine (NORVASC) 5 MG tablet; Take 1 tablet (5 mg total) by mouth once daily.  Dispense: 90 tablet; Refill: 3    Sore throat          -    Chloraseptic spray.       Two weeks with me.          Darío Rios PA-C  Family Medicine Physician Assistant       I spent a total of 20 minutes on the day of the visit.This includes face to face time and non-face to face time preparing to see the patient (eg, review of tests), obtaining and/or reviewing separately obtained history, documenting clinical information in the electronic or other health record, independently interpreting results and communicating results to the patient/family/caregiver, or care coordinator.      We have addressed [4] Moderate: 1 or more chronic illnesses with exacerbation, progression, or side effects of treatment / 2 or more  stable chronic illnesses / 1 undiagnosed new problem with uncertain prognosis / 1 acute illness with systemic symptoms / 1 acute complicated injury  The complexity of the data reviewed and analyzed for this visit was [2] Minimal or None  The risk of complications and/or morbidity or mortality are [4] Moderate risk (I.e. prescription drug management / decision regarding minor surgery with identified pt or procedure risk factors / decision regarding elective major surgery without identified pt or procedure risk factors / diagnosis or treatment significantly limited by social determinants of health)   The level of Medical Decision Making for this visit is [4] Moderate

## 2022-10-10 NOTE — PATIENT INSTRUCTIONS
"Soren Briggs,     If you are due for any health screening(s) below please notify me so we can arrange them to be ordered and scheduled to maintain your health. Most healthy patients complete it. Don't lose out on improving your health.     Tests to Keep You Healthy    Tobacco Cessation: NO                          Patient Education       Checking Your Blood Pressure at Home   The Basics   Written by the doctors and editors at [facility]   How is blood pressure measured? -- Blood pressure is usually measured with a device that goes around your upper arm. This is often done in a doctor's office. But some people also check their blood pressure themselves, at home or at work.  Blood pressure is explained with 2 numbers. For instance, your blood pressure might be "140 over 90." The first (top) number is the pressure inside your arteries when your heart is wyatt. The second (bottom) number is the pressure inside your arteries when your heart is relaxed. The table shows how doctors and nurses define high and normal blood pressure (table 1).  If your blood pressure gets too high, it puts you at risk for heart attack, stroke, and kidney disease. High blood pressure does not usually cause symptoms. But it can be serious.  What is a home blood pressure meter? -- A home blood pressure meter (or "monitor") is a device you can use to check your blood pressure yourself. It has a cuff that goes around your upper arm (figure 1). Some devices have a cuff that goes around your wrist instead. But doctors aren't sure if these work as well. The meter also has a small screen, or dial, that shows your blood pressure numbers.  There are also special meters you can wear for a day or 2. These are different because they automatically check your blood pressure throughout the day and night, even while you are sleeping. If your doctor thinks you should use one of these devices, they will talk to you about how to wear it.  Why do I need to check " my blood pressure at home? -- If your doctor knows or suspects that you have high blood pressure, they might want you to check it at home. There are a few reasons for this. Your doctor might want to look at:  Whether your blood pressure measures the same at home as it did in the doctor's office  How well your blood pressure medicines are working  Changes in your blood pressure, for example, if it goes up and down  People who check their own blood pressure at home usually do better at keeping it low.  How do I choose a home blood pressure meter? -- When choosing a home blood pressure meter, you will probably want to think about:  Cost - Some devices cost more than others. You should also check to see if your insurance will help pay for your device.  Size - It's important to make sure the cuff fits your arm comfortably. Your doctor or nurse can help you with this.  How easy it is to use - You should make sure you understand how to use the device. You also need to be able to read the numbers on the screen.  You do not need a prescription to buy a home blood pressure meter. You can buy them at most pharmacies or over the internet. Your doctor or nurse can help you choose the right device for you.  How do I check my blood pressure at home? -- Once you have a home blood pressure meter, your doctor or nurse should check it to make sure it fits you and works correctly.  When it's time to check your blood pressure:  Go to the bathroom and empty your bladder first. Having a full bladder can temporarily increase your blood pressure, making the results inaccurate.  Sit in a chair with your feet flat on the ground.  Try to breathe normally and stay calm.  Attach the cuff to your arm. Place the cuff directly on your skin, not over your clothing. The cuff should be tight enough to not slip down, but not uncomfortably tight.  Sit and relax for about 3 to 5 minutes with the cuff on.  Follow the directions that came with your device  to start measuring your blood pressure. This might involve squeezing the bulb at the end of the tube to inflate the cuff (fill it with air). With some monitors, you just need to press a button to inflate the cuff. When the cuff fills with air, it feels like someone is squeezing your arm, but it should not hurt. Then you will slowly deflate the cuff (let the air out of it), or it will deflate by itself. The screen or dial will show your blood pressure numbers.  Stay seated and relax for 1 minute, then measure your blood pressure again.  How often should I check my blood pressure? -- It depends. Different people need to follow different schedules. Your doctor or nurse will tell you how often to check your blood pressure, and when. Some people need to check their blood pressure twice a day, in the morning and evening.  Your doctor or nurse will probably tell you to keep track of your blood pressure for at least a few days (table 2). Then they will look at the numbers. The reason for this is that it's normal for your blood pressure to change a bit from day to day. For example, the numbers might change depending on whether you recently had caffeine, just exercised, or feel stressed. Checking your blood pressure over several days - or longer - will give your doctor or nurse a better idea of what is average for you.  How should I keep track of my blood pressure? -- Some blood pressure meters will record your numbers for you, or send them to your computer or smartphone. If yours does not do this, you will need to write them down. Your doctor or nurse can help you figure out the best way to keep track of the numbers.  What if my blood pressure is high? -- Your doctor or nurse will tell you what to do if your blood pressure is high when you check it at home. If you get a number that is higher than normal, measure it again to see if it is still high. If it is very high (above a certain number, which your doctor or nurse will  "tell you to watch out for), you should call your doctor right away.  If your blood pressure is only a little high, your doctor or nurse might tell you to keep checking it for a few more days or weeks, and then call if it does not go back down. Then they can help you decide what to do next.  All topics are updated as new evidence becomes available and our peer review process is complete.  This topic retrieved from [employer] on: Sep 21, 2021.  Topic 005215 Version 4.0  Release: 29.4.2 - C29.263  © 2021 UpToDate, Inc. and/or its affiliates. All rights reserved.  table 1: Definition of normal and high blood pressure  Level  Top number  Bottom number    High 130 or above 80 or above   Elevated 120 to 129 79 or below   Normal 119 or below 79 or below   These definitions are from the American College of Cardiology/American Heart Association. Other expert groups might use slightly different definitions.  "Elevated blood pressure" is a term doctor or nurses use as a warning. It means you do not yet have high blood pressure, but your blood pressure is not as low as it should be for good health.  Graphic 49922 Version 6.0  figure 1: Using a home blood pressure meter     This is an example of a person using a home blood pressure meter.  Graphic 041862 Version 1.0    table 2: 7-day diary for checking blood pressure at home  Day 1  Day 2  Day 3  Day 4  Day 5  Day 6  Day 7    Morning  1st read Morning  1st read Morning  1st read Morning  1st read Morning  1st read Morning  1st read Morning  1st read   Systolic: __________ Systolic: __________ Systolic: __________ Systolic: __________ Systolic: __________ Systolic: __________ Systolic: __________   Diastolic: __________ Diastolic: __________ Diastolic: __________ Diastolic: __________ Diastolic: __________ Diastolic: __________ Diastolic: __________   Pulse: __________ Pulse: __________ Pulse: __________ Pulse: __________ Pulse: __________ Pulse: __________ Pulse: __________ "   Morning  2nd read Morning  2nd read Morning  2nd read Morning  2nd read Morning  2nd read Morning  2nd read Morning  2nd read   Systolic: __________ Systolic: __________ Systolic: __________ Systolic: __________ Systolic: __________ Systolic: __________ Systolic: __________   Diastolic: __________ Diastolic: __________ Diastolic: __________ Diastolic: __________ Diastolic: __________ Diastolic: __________ Diastolic: __________   Pulse: __________ Pulse: __________ Pulse: __________ Pulse: __________ Pulse: __________ Pulse: __________ Pulse: __________   Evening  1st read Evening  1st read Evening  1st read Evening  1st read Evening  1st read Evening  1st read Evening  1st read   Systolic: __________ Systolic: __________ Systolic: __________ Systolic: __________ Systolic: __________ Systolic: __________ Systolic: __________   Diastolic: __________ Diastolic: __________ Diastolic: __________ Diastolic: __________ Diastolic: __________ Diastolic: __________ Diastolic: __________   Pulse: __________ Pulse: __________ Pulse: __________ Pulse: __________ Pulse: __________ Pulse: __________ Pulse: __________   Evening  2nd read Evening  2nd read Evening  2nd read Evening  2nd read Evening  2nd read Evening  2nd read Evening  2nd read   Systolic: __________ Systolic: __________ Systolic: __________ Systolic: __________ Systolic: __________ Systolic: __________ Systolic: __________   Diastolic: __________ Diastolic: __________ Diastolic: __________ Diastolic: __________ Diastolic: __________ Diastolic: __________ Diastolic: __________   Pulse: __________ Pulse: __________ Pulse: __________ Pulse: __________ Pulse: __________ Pulse: __________ Pulse: __________   Notes    Notes    Notes    Notes    Notes    Notes    Notes      ____________________ ____________________ ____________________ ____________________ ____________________ ____________________ ____________________   ____________________ ____________________  ____________________ ____________________ ____________________ ____________________ ____________________   ____________________ ____________________ ____________________ ____________________ ____________________ ____________________ ____________________   Patient name: ______________________________     Patient ID: ________________________________    Primary care provider: _______________________    Average BP: _______________________________    [facility] 414938 Version 1.0  Consumer Information Use and Disclaimer   This information is not specific medical advice and does not replace information you receive from your health care provider. This is only a brief summary of general information. It does NOT include all information about conditions, illnesses, injuries, tests, procedures, treatments, therapies, discharge instructions or life-style choices that may apply to you. You must talk with your health care provider for complete information about your health and treatment options. This information should not be used to decide whether or not to accept your health care provider's advice, instructions or recommendations. Only your health care provider has the knowledge and training to provide advice that is right for you. The use of this information is governed by the [employer] User License Agreement, available at https://[email or web address]/en/solutions/[employer] [email or web address]/tatila.The use of [employer] content is governed by the [employer] Terms of Use. ©2021 [employer] All rights reserved.  Copyright   © 2021 UpToDate, Inc. and/or its affiliates. All rights reserved.

## 2022-10-21 ENCOUNTER — OFFICE VISIT (OUTPATIENT)
Dept: FAMILY MEDICINE | Facility: CLINIC | Age: 27
End: 2022-10-21
Payer: COMMERCIAL

## 2022-10-21 VITALS
HEIGHT: 71 IN | RESPIRATION RATE: 18 BRPM | TEMPERATURE: 99 F | WEIGHT: 251.56 LBS | OXYGEN SATURATION: 97 % | SYSTOLIC BLOOD PRESSURE: 136 MMHG | HEART RATE: 70 BPM | DIASTOLIC BLOOD PRESSURE: 80 MMHG | BODY MASS INDEX: 35.22 KG/M2

## 2022-10-21 DIAGNOSIS — R12 HEARTBURN: ICD-10-CM

## 2022-10-21 DIAGNOSIS — K21.9 GASTROESOPHAGEAL REFLUX DISEASE, UNSPECIFIED WHETHER ESOPHAGITIS PRESENT: ICD-10-CM

## 2022-10-21 DIAGNOSIS — I10 ESSENTIAL HYPERTENSION: Primary | ICD-10-CM

## 2022-10-21 DIAGNOSIS — J02.9 SORE THROAT: ICD-10-CM

## 2022-10-21 PROCEDURE — 99999 PR PBB SHADOW E&M-EST. PATIENT-LVL IV: ICD-10-PCS | Mod: PBBFAC,,,

## 2022-10-21 PROCEDURE — 99999 PR PBB SHADOW E&M-EST. PATIENT-LVL IV: CPT | Mod: PBBFAC,,,

## 2022-10-21 PROCEDURE — 1159F PR MEDICATION LIST DOCUMENTED IN MEDICAL RECORD: ICD-10-PCS | Mod: CPTII,S$GLB,,

## 2022-10-21 PROCEDURE — 99214 OFFICE O/P EST MOD 30 MIN: CPT | Mod: S$GLB,,,

## 2022-10-21 PROCEDURE — 3044F HG A1C LEVEL LT 7.0%: CPT | Mod: CPTII,S$GLB,,

## 2022-10-21 PROCEDURE — 1159F MED LIST DOCD IN RCRD: CPT | Mod: CPTII,S$GLB,,

## 2022-10-21 PROCEDURE — 3075F SYST BP GE 130 - 139MM HG: CPT | Mod: CPTII,S$GLB,,

## 2022-10-21 PROCEDURE — 3079F PR MOST RECENT DIASTOLIC BLOOD PRESSURE 80-89 MM HG: ICD-10-PCS | Mod: CPTII,S$GLB,,

## 2022-10-21 PROCEDURE — 3044F PR MOST RECENT HEMOGLOBIN A1C LEVEL <7.0%: ICD-10-PCS | Mod: CPTII,S$GLB,,

## 2022-10-21 PROCEDURE — 1160F RVW MEDS BY RX/DR IN RCRD: CPT | Mod: CPTII,S$GLB,,

## 2022-10-21 PROCEDURE — 99214 PR OFFICE/OUTPT VISIT, EST, LEVL IV, 30-39 MIN: ICD-10-PCS | Mod: S$GLB,,,

## 2022-10-21 PROCEDURE — 3075F PR MOST RECENT SYSTOLIC BLOOD PRESS GE 130-139MM HG: ICD-10-PCS | Mod: CPTII,S$GLB,,

## 2022-10-21 PROCEDURE — 1160F PR REVIEW ALL MEDS BY PRESCRIBER/CLIN PHARMACIST DOCUMENTED: ICD-10-PCS | Mod: CPTII,S$GLB,,

## 2022-10-21 PROCEDURE — 3079F DIAST BP 80-89 MM HG: CPT | Mod: CPTII,S$GLB,,

## 2022-10-21 RX ORDER — OMEPRAZOLE 40 MG/1
40 CAPSULE, DELAYED RELEASE ORAL 2 TIMES DAILY
Qty: 180 CAPSULE | Refills: 3 | Status: SHIPPED | OUTPATIENT
Start: 2022-10-21 | End: 2022-11-15 | Stop reason: SDUPTHER

## 2022-10-21 NOTE — PATIENT INSTRUCTIONS
Soren Briggs,     If you are due for any health screening(s) below please notify me so we can arrange them to be ordered and scheduled to maintain your health. Most healthy patients complete it. Don't lose out on improving your health.     Tests to Keep You Healthy    Last Blood Pressure <= 139/89 (10/21/2022): NO  Tobacco Cessation: NO

## 2022-10-21 NOTE — PROGRESS NOTES
Subjective:       Patient ID: Chester Red Jr. is a 27 y.o. male.    Chief Complaint: Follow-up and Hypertension    Presents to the clinic for follow up of HTN. Doing well on current regimen. Pressures stable at presentation today and at home. No longer having the associated symptoms he was experiencing before. Still having issues with the sore throat. Having heartburn and excessive burping lately. Has been out of his omeprazole which previously helped with this. I believe the sore throat is related.       Past Medical History:   Diagnosis Date    Acid reflux     Hypertension        Review of patient's allergies indicates:  No Known Allergies      Current Outpatient Medications:     amLODIPine (NORVASC) 5 MG tablet, Take 1 tablet (5 mg total) by mouth once daily., Disp: 90 tablet, Rfl: 3    ibuprofen (ADVIL,MOTRIN) 800 MG tablet, Take 800 mg by mouth 3 (three) times daily., Disp: , Rfl:     acetaminophen (TYLENOL) 325 MG tablet, Take 325 mg by mouth every 6 (six) hours as needed for Pain., Disp: , Rfl:     olopatadine (PATANOL) 0.1 % ophthalmic solution, Place 1 drop into both eyes 2 (two) times daily. (Patient not taking: No sig reported), Disp: 5 mL, Rfl: 0    omeprazole (PRILOSEC) 40 MG capsule, Take 1 capsule (40 mg total) by mouth 2 (two) times a day., Disp: 180 capsule, Rfl: 3    sucralfate (CARAFATE) 1 gram tablet, Take 1 tablet (1 g total) by mouth 4 (four) times daily before meals and nightly. (Patient not taking: No sig reported), Disp: 360 tablet, Rfl: 0    Review of Systems   Constitutional:  Negative for activity change, appetite change, chills, diaphoresis, fatigue, fever and unexpected weight change.   HENT:  Positive for sore throat. Negative for congestion, ear pain, postnasal drip, rhinorrhea, sinus pressure, sneezing and trouble swallowing.    Eyes:  Negative for pain, itching and visual disturbance.   Respiratory:  Negative for cough, chest tightness, shortness of breath and wheezing.   "  Cardiovascular:  Negative for chest pain, palpitations and leg swelling.   Gastrointestinal:  Negative for abdominal distention, abdominal pain, blood in stool, constipation, diarrhea, nausea and vomiting.   Endocrine: Negative for cold intolerance and heat intolerance.   Genitourinary:  Negative for difficulty urinating, dysuria, frequency, hematuria and urgency.   Musculoskeletal:  Negative for arthralgias, back pain, myalgias and neck pain.   Skin:  Negative for color change, pallor, rash and wound.   Neurological:  Positive for headaches. Negative for dizziness, syncope, weakness and numbness.   Hematological:  Negative for adenopathy.   Psychiatric/Behavioral:  Negative for behavioral problems. The patient is not nervous/anxious.      Objective:      /80 (BP Location: Right arm, Patient Position: Sitting, BP Method: Large (Manual))   Pulse 70   Temp 98.7 °F (37.1 °C) (Oral)   Resp 18   Ht 5' 11" (1.803 m)   Wt 114.1 kg (251 lb 8.7 oz)   SpO2 97%   BMI 35.08 kg/m²   Physical Exam  Vitals reviewed.   Constitutional:       General: He is not in acute distress.     Appearance: Normal appearance. He is obese. He is not ill-appearing, toxic-appearing or diaphoretic.   HENT:      Head: Normocephalic.      Right Ear: External ear normal.      Left Ear: External ear normal.      Nose: Nose normal. No congestion or rhinorrhea.      Mouth/Throat:      Mouth: Mucous membranes are moist.      Pharynx: Oropharynx is clear. Posterior oropharyngeal erythema present.   Eyes:      General: No scleral icterus.        Right eye: No discharge.         Left eye: No discharge.      Extraocular Movements: Extraocular movements intact.      Conjunctiva/sclera: Conjunctivae normal.   Cardiovascular:      Rate and Rhythm: Normal rate and regular rhythm.      Pulses: Normal pulses.      Heart sounds: Normal heart sounds. No murmur heard.    No friction rub. No gallop.   Pulmonary:      Effort: Pulmonary effort is normal. No " respiratory distress.      Breath sounds: Normal breath sounds. No wheezing, rhonchi or rales.   Chest:      Chest wall: No tenderness.   Abdominal:      General: There is no distension.      Palpations: Abdomen is soft. There is no mass.      Tenderness: There is no abdominal tenderness. There is no guarding.   Musculoskeletal:         General: No swelling, tenderness or deformity. Normal range of motion.      Cervical back: Normal range of motion.      Right lower leg: No edema.      Left lower leg: No edema.   Skin:     General: Skin is warm and dry.      Capillary Refill: Capillary refill takes less than 2 seconds.      Coloration: Skin is not jaundiced.      Findings: No bruising, erythema, lesion or rash.   Neurological:      Mental Status: He is alert and oriented to person, place, and time.      Gait: Gait normal.   Psychiatric:         Mood and Affect: Mood normal.         Behavior: Behavior normal.         Thought Content: Thought content normal.         Judgment: Judgment normal.       Assessment:       1. Essential hypertension    2. Heartburn    3. Gastroesophageal reflux disease, unspecified whether esophagitis present    4. Sore throat          Plan:       Essential hypertension       -      Stable. Continue meds as prescribed. Will continue to monitor.    Heartburn  -     omeprazole (PRILOSEC) 40 MG capsule; Take 1 capsule (40 mg total) by mouth 2 (two) times a day.  Dispense: 180 capsule; Refill: 3    Gastroesophageal reflux disease, unspecified whether esophagitis present  -     omeprazole (PRILOSEC) 40 MG capsule; Take 1 capsule (40 mg total) by mouth 2 (two) times a day.  Dispense: 180 capsule; Refill: 3    Sore throat  -     omeprazole (PRILOSEC) 40 MG capsule; Take 1 capsule (40 mg total) by mouth 2 (two) times a day.  Dispense: 180 capsule; Refill: 3        Has follow up with Linda Rios PA-C  Family Medicine Physician Assistant       I spent a total of 20 minutes  on the day of the visit.This includes face to face time and non-face to face time preparing to see the patient (eg, review of tests), obtaining and/or reviewing separately obtained history, documenting clinical information in the electronic or other health record, independently interpreting results and communicating results to the patient/family/caregiver, or care coordinator.      We have addressed [4] Moderate: 1 or more chronic illnesses with exacerbation, progression, or side effects of treatment / 2 or more stable chronic illnesses / 1 undiagnosed new problem with uncertain prognosis / 1 acute illness with systemic symptoms / 1 acute complicated injury  The complexity of the data reviewed and analyzed for this visit was [2] Minimal or None  The risk of complications and/or morbidity or mortality are [4] Moderate risk (I.e. prescription drug management / decision regarding minor surgery with identified pt or procedure risk factors / decision regarding elective major surgery without identified pt or procedure risk factors / diagnosis or treatment significantly limited by social determinants of health)   The level of Medical Decision Making for this visit is [4] Moderate

## 2022-11-11 ENCOUNTER — HOSPITAL ENCOUNTER (EMERGENCY)
Facility: HOSPITAL | Age: 27
Discharge: HOME | End: 2022-11-12
Attending: EMERGENCY MEDICINE
Payer: COMMERCIAL

## 2022-11-11 DIAGNOSIS — R42 LIGHTHEADEDNESS: ICD-10-CM

## 2022-11-11 DIAGNOSIS — R07.9 CHEST PAIN, UNSPECIFIED TYPE: Primary | ICD-10-CM

## 2022-11-11 DIAGNOSIS — R07.9 CHEST PAIN: ICD-10-CM

## 2022-11-11 DIAGNOSIS — R06.02 SOB (SHORTNESS OF BREATH): ICD-10-CM

## 2022-11-11 LAB
ALBUMIN SERPL BCP-MCNC: 4.5 G/DL (ref 3.5–5.2)
ALP SERPL-CCNC: 49 U/L (ref 55–135)
ALT SERPL W/O P-5'-P-CCNC: 28 U/L (ref 10–44)
ANION GAP SERPL CALC-SCNC: 6 MMOL/L (ref 8–16)
AST SERPL-CCNC: 24 U/L (ref 10–40)
BASOPHILS # BLD AUTO: 0.05 K/UL (ref 0–0.2)
BASOPHILS NFR BLD: 0.8 % (ref 0–1.9)
BILIRUB SERPL-MCNC: 0.8 MG/DL (ref 0.1–1)
BNP SERPL-MCNC: 11 PG/ML (ref 0–99)
BUN SERPL-MCNC: 16 MG/DL (ref 6–20)
CALCIUM SERPL-MCNC: 9.2 MG/DL (ref 8.7–10.5)
CHLORIDE SERPL-SCNC: 103 MMOL/L (ref 95–110)
CO2 SERPL-SCNC: 28 MMOL/L (ref 23–29)
CREAT SERPL-MCNC: 1.2 MG/DL (ref 0.5–1.4)
DIFFERENTIAL METHOD: ABNORMAL
EOSINOPHIL # BLD AUTO: 0.2 K/UL (ref 0–0.5)
EOSINOPHIL NFR BLD: 3.2 % (ref 0–8)
ERYTHROCYTE [DISTWIDTH] IN BLOOD BY AUTOMATED COUNT: 13.5 % (ref 11.5–14.5)
EST. GFR  (NO RACE VARIABLE): >60 ML/MIN/1.73 M^2
GLUCOSE SERPL-MCNC: 99 MG/DL (ref 70–110)
HCT VFR BLD AUTO: 41.2 % (ref 40–54)
HGB BLD-MCNC: 13.6 G/DL (ref 14–18)
IMM GRANULOCYTES # BLD AUTO: 0.01 K/UL (ref 0–0.04)
IMM GRANULOCYTES NFR BLD AUTO: 0.2 % (ref 0–0.5)
LYMPHOCYTES # BLD AUTO: 2.5 K/UL (ref 1–4.8)
LYMPHOCYTES NFR BLD: 42 % (ref 18–48)
MCH RBC QN AUTO: 27.1 PG (ref 27–31)
MCHC RBC AUTO-ENTMCNC: 33 G/DL (ref 32–36)
MCV RBC AUTO: 82 FL (ref 82–98)
MONOCYTES # BLD AUTO: 0.5 K/UL (ref 0.3–1)
MONOCYTES NFR BLD: 9.1 % (ref 4–15)
NEUTROPHILS # BLD AUTO: 2.7 K/UL (ref 1.8–7.7)
NEUTROPHILS NFR BLD: 44.7 % (ref 38–73)
NRBC BLD-RTO: 0 /100 WBC
PLATELET # BLD AUTO: 308 K/UL (ref 150–450)
PMV BLD AUTO: 9.9 FL (ref 9.2–12.9)
POTASSIUM SERPL-SCNC: 3.5 MMOL/L (ref 3.5–5.1)
PROT SERPL-MCNC: 7.7 G/DL (ref 6–8.4)
RBC # BLD AUTO: 5.02 M/UL (ref 4.6–6.2)
SODIUM SERPL-SCNC: 137 MMOL/L (ref 136–145)
TROPONIN I SERPL DL<=0.01 NG/ML-MCNC: <0.03 NG/ML
WBC # BLD AUTO: 5.95 K/UL (ref 3.9–12.7)

## 2022-11-11 PROCEDURE — 93010 EKG 12-LEAD: ICD-10-PCS | Mod: ,,, | Performed by: SPECIALIST

## 2022-11-11 PROCEDURE — 25000003 PHARM REV CODE 250: Performed by: STUDENT IN AN ORGANIZED HEALTH CARE EDUCATION/TRAINING PROGRAM

## 2022-11-11 PROCEDURE — 93005 ELECTROCARDIOGRAM TRACING: CPT | Performed by: SPECIALIST

## 2022-11-11 PROCEDURE — 80053 COMPREHEN METABOLIC PANEL: CPT | Performed by: STUDENT IN AN ORGANIZED HEALTH CARE EDUCATION/TRAINING PROGRAM

## 2022-11-11 PROCEDURE — 83880 ASSAY OF NATRIURETIC PEPTIDE: CPT | Performed by: STUDENT IN AN ORGANIZED HEALTH CARE EDUCATION/TRAINING PROGRAM

## 2022-11-11 PROCEDURE — 85025 COMPLETE CBC W/AUTO DIFF WBC: CPT | Performed by: STUDENT IN AN ORGANIZED HEALTH CARE EDUCATION/TRAINING PROGRAM

## 2022-11-11 PROCEDURE — 99284 EMERGENCY DEPT VISIT MOD MDM: CPT | Mod: 25

## 2022-11-11 PROCEDURE — 93010 ELECTROCARDIOGRAM REPORT: CPT | Mod: ,,, | Performed by: SPECIALIST

## 2022-11-11 PROCEDURE — 84484 ASSAY OF TROPONIN QUANT: CPT | Performed by: STUDENT IN AN ORGANIZED HEALTH CARE EDUCATION/TRAINING PROGRAM

## 2022-11-11 RX ORDER — ASPIRIN 325 MG
325 TABLET ORAL
Status: COMPLETED | OUTPATIENT
Start: 2022-11-11 | End: 2022-11-11

## 2022-11-11 RX ADMIN — ASPIRIN 325 MG ORAL TABLET 325 MG: 325 PILL ORAL at 10:11

## 2022-11-12 VITALS
DIASTOLIC BLOOD PRESSURE: 81 MMHG | RESPIRATION RATE: 20 BRPM | HEART RATE: 60 BPM | TEMPERATURE: 99 F | SYSTOLIC BLOOD PRESSURE: 133 MMHG | OXYGEN SATURATION: 98 % | BODY MASS INDEX: 32.08 KG/M2 | WEIGHT: 230 LBS

## 2022-11-12 LAB — TROPONIN I SERPL DL<=0.01 NG/ML-MCNC: <0.03 NG/ML

## 2022-11-12 PROCEDURE — 84484 ASSAY OF TROPONIN QUANT: CPT | Performed by: STUDENT IN AN ORGANIZED HEALTH CARE EDUCATION/TRAINING PROGRAM

## 2022-11-12 NOTE — DISCHARGE INSTRUCTIONS
Please return to the ER if you have any of the following:  - Continued chest pain  - Trouble breathing  - Nausea or vomiting  - Severe abdominal pain  - Lightheadedness or if you feel like you are going to pass out  - Any new or concerning symptoms

## 2022-11-12 NOTE — ED PROVIDER NOTES
Encounter Date: 11/11/2022       History     Chief Complaint   Patient presents with    Chest Pain     Left sided chest pain radiating down left arm with dizziness and sob     Patient is a 27-year-old male with a past medical history of hypertension who presents with left-sided chest pain radiating to neck and left arm that began 2 hours PTA.  Patient states that he also had lightheadedness and shortness of breath associated with onset of chest pain.  He endorses continued left arm pain but denies current shortness of breath, lightheadedness.  He denies nausea, vomiting, abdominal pain, syncope, fever, cough.    The history is provided by the patient.   Review of patient's allergies indicates:  No Known Allergies  Past Medical History:   Diagnosis Date    Acid reflux     Hypertension      Past Surgical History:   Procedure Laterality Date    ACHILLES TENDON SURGERY      ESOPHAGOGASTRODUODENOSCOPY N/A 8/1/2022    Procedure: EGD (ESOPHAGOGASTRODUODENOSCOPY);  Surgeon: Bubba Lu MD;  Location: [facility];  Service: Endoscopy;  Laterality: N/A;     Family History   Problem Relation Age of Onset    Prostate cancer Paternal Grandfather     Colon cancer Neg Hx     Colon polyps Neg Hx     Esophageal cancer Neg Hx     Stomach cancer Neg Hx      Social History     Tobacco Use    Smoking status: Every Day     Types: Vaping with nicotine    Smokeless tobacco: Never    Tobacco comments:     Cigar   Substance Use Topics    Alcohol use: Not Currently     Comment: stopped a few months ago    Drug use: No     Review of Systems   Constitutional:  Negative for chills and fever.   HENT:  Negative for congestion and rhinorrhea.    Respiratory:  Positive for shortness of breath. Negative for cough.    Cardiovascular:  Positive for chest pain.   Gastrointestinal:  Negative for abdominal pain, nausea and vomiting.   Genitourinary:  Negative for dysuria and hematuria.   Musculoskeletal:  Negative for back pain and neck pain.         Left arm pain   Skin:  Negative for rash and wound.   Neurological:  Positive for light-headedness. Negative for syncope and headaches.   Hematological:  Does not bruise/bleed easily.   Psychiatric/Behavioral:  Negative for agitation and confusion.      Physical Exam     Initial Vitals [11/11/22 2232]   BP Pulse Resp Temp SpO2   (!) 154/96 71 18 (!) 46.8 °F (8.2 °C) 98 %      MAP       --         Physical Exam    Nursing note and vitals reviewed.  Constitutional: He is not diaphoretic.  Non-toxic appearance.   HENT:   Head: Normocephalic and atraumatic.   Eyes: EOM are normal.   Neck: Neck supple.   Normal range of motion.  Cardiovascular:  Normal rate, regular rhythm, normal heart sounds and intact distal pulses.     Exam reveals no gallop and no friction rub.       No murmur heard.  Pulmonary/Chest: Breath sounds normal. No respiratory distress. He has no wheezes. He has no rhonchi. He has no rales. He exhibits no tenderness.   Abdominal: Abdomen is soft. He exhibits no distension. There is no abdominal tenderness. There is no guarding.   Musculoskeletal:         General: No edema.      Cervical back: Normal range of motion and neck supple.      Comments: No obvious deformities to extremities.     Neurological: He is alert and oriented to person, place, and time.   Moves all extremities well and equally. Cranial nerves 2-12 grossly intact.   Skin: Skin is warm and dry.   Psychiatric: He has a normal mood and affect. Thought content normal.       ED Course   Procedures  Labs Reviewed   CBC W/ AUTO DIFFERENTIAL - Abnormal; Notable for the following components:       Result Value    Hemoglobin 13.6 (*)     All other components within normal limits   COMPREHENSIVE METABOLIC PANEL - Abnormal; Notable for the following components:    Alkaline Phosphatase 49 (*)     Anion Gap 6 (*)     All other components within normal limits   TROPONIN I   B-TYPE NATRIURETIC PEPTIDE   TROPONIN I          Imaging Results               X-Ray Chest AP Portable (In process)                      Medications   aspirin tablet 325 mg (325 mg Oral Given 11/11/22 4445)     Medical Decision Making:   Initial Assessment:   Patient is a 27-year-old male here with left-sided chest pain, shortness of breath, lightheadedness.  Patient nontoxic appearing, hypertensive to 154/96 with normal heart rate, satting well on room air with normal respiratory rate, afebrile.  Differential Diagnosis:   ACS, arrhythmia, musculoskeletal pain, pneumonia, electrolyte abnormality.  Independently Interpreted Test(s):   I have ordered and independently interpreted X-rays - see summary below.       <> Summary of X-Ray Reading(s): Chest x-ray without acute abnormality on my independent review  Clinical Tests:   Lab Tests: Ordered and Reviewed  The following lab test(s) were unremarkable: CMP, BNP and Troponin       <> Summary of Lab: Mildly decreased hemoglobin.  Radiological Study: Ordered and Reviewed  Medical Tests: Ordered and Reviewed  ED Management:  Patient PERC negative, low risk for PE.  Aspirin given.  EKG shows normal sinus rhythm rate of 73, no ST segment elevation or depression, T-wave inversion in lead 3. Patient hemodynamically stable with unremarkable cardiac workup.  Patient hemodynamically stable and in no respiratory distress, appropriate for discharge home with PCP follow-up.  Strict return precautions issued.  Patient verbalized understanding agrees with plan.  Case discussed with Dr. Lorenzana.                        Clinical Impression:   Final diagnoses:  [R07.9] Chest pain  [R07.9] Chest pain, unspecified type (Primary)  [R06.02] SOB (shortness of breath)  [R42] Lightheadedness        ED Disposition Condition    Discharge Stable          ED Prescriptions    None       Follow-up Information       Follow up With Specialties Details Why Contact Info    Armando Mckeon MD Family Medicine Schedule an appointment as soon as possible for a visit in 3 days For  follow up for your ER visit [address]  [place] [address]  [phone]               Eden Montes De Oca MD  Resident  11/12/22 8156

## 2022-11-15 ENCOUNTER — OFFICE VISIT (OUTPATIENT)
Dept: FAMILY MEDICINE | Facility: CLINIC | Age: 27
End: 2022-11-15
Payer: COMMERCIAL

## 2022-11-15 VITALS
HEART RATE: 81 BPM | BODY MASS INDEX: 34.81 KG/M2 | WEIGHT: 248.69 LBS | SYSTOLIC BLOOD PRESSURE: 130 MMHG | TEMPERATURE: 98 F | HEIGHT: 71 IN | OXYGEN SATURATION: 98 % | DIASTOLIC BLOOD PRESSURE: 84 MMHG

## 2022-11-15 DIAGNOSIS — K21.9 GASTROESOPHAGEAL REFLUX DISEASE, UNSPECIFIED WHETHER ESOPHAGITIS PRESENT: ICD-10-CM

## 2022-11-15 DIAGNOSIS — I10 ESSENTIAL HYPERTENSION: ICD-10-CM

## 2022-11-15 DIAGNOSIS — G44.209 TENSION HEADACHE: Primary | ICD-10-CM

## 2022-11-15 PROCEDURE — 99214 PR OFFICE/OUTPT VISIT, EST, LEVL IV, 30-39 MIN: ICD-10-PCS | Mod: S$GLB,,, | Performed by: PHYSICIAN ASSISTANT

## 2022-11-15 PROCEDURE — 99999 PR PBB SHADOW E&M-EST. PATIENT-LVL IV: CPT | Mod: PBBFAC,,, | Performed by: PHYSICIAN ASSISTANT

## 2022-11-15 PROCEDURE — 3075F PR MOST RECENT SYSTOLIC BLOOD PRESS GE 130-139MM HG: ICD-10-PCS | Mod: CPTII,S$GLB,, | Performed by: PHYSICIAN ASSISTANT

## 2022-11-15 PROCEDURE — 3008F BODY MASS INDEX DOCD: CPT | Mod: CPTII,S$GLB,, | Performed by: PHYSICIAN ASSISTANT

## 2022-11-15 PROCEDURE — 3079F PR MOST RECENT DIASTOLIC BLOOD PRESSURE 80-89 MM HG: ICD-10-PCS | Mod: CPTII,S$GLB,, | Performed by: PHYSICIAN ASSISTANT

## 2022-11-15 PROCEDURE — 3079F DIAST BP 80-89 MM HG: CPT | Mod: CPTII,S$GLB,, | Performed by: PHYSICIAN ASSISTANT

## 2022-11-15 PROCEDURE — 3044F PR MOST RECENT HEMOGLOBIN A1C LEVEL <7.0%: ICD-10-PCS | Mod: CPTII,S$GLB,, | Performed by: PHYSICIAN ASSISTANT

## 2022-11-15 PROCEDURE — 99999 PR PBB SHADOW E&M-EST. PATIENT-LVL IV: ICD-10-PCS | Mod: PBBFAC,,, | Performed by: PHYSICIAN ASSISTANT

## 2022-11-15 PROCEDURE — 3044F HG A1C LEVEL LT 7.0%: CPT | Mod: CPTII,S$GLB,, | Performed by: PHYSICIAN ASSISTANT

## 2022-11-15 PROCEDURE — 99214 OFFICE O/P EST MOD 30 MIN: CPT | Mod: S$GLB,,, | Performed by: PHYSICIAN ASSISTANT

## 2022-11-15 PROCEDURE — 3075F SYST BP GE 130 - 139MM HG: CPT | Mod: CPTII,S$GLB,, | Performed by: PHYSICIAN ASSISTANT

## 2022-11-15 PROCEDURE — 1159F PR MEDICATION LIST DOCUMENTED IN MEDICAL RECORD: ICD-10-PCS | Mod: CPTII,S$GLB,, | Performed by: PHYSICIAN ASSISTANT

## 2022-11-15 PROCEDURE — 3008F PR BODY MASS INDEX (BMI) DOCUMENTED: ICD-10-PCS | Mod: CPTII,S$GLB,, | Performed by: PHYSICIAN ASSISTANT

## 2022-11-15 PROCEDURE — 1159F MED LIST DOCD IN RCRD: CPT | Mod: CPTII,S$GLB,, | Performed by: PHYSICIAN ASSISTANT

## 2022-11-15 RX ORDER — TIZANIDINE 4 MG/1
4 TABLET ORAL NIGHTLY
Qty: 30 TABLET | Refills: 2 | Status: SHIPPED | OUTPATIENT
Start: 2022-11-15 | End: 2022-11-25

## 2022-11-15 RX ORDER — OMEPRAZOLE 40 MG/1
40 CAPSULE, DELAYED RELEASE ORAL EVERY MORNING
Qty: 90 CAPSULE | Refills: 0
Start: 2022-11-15 | End: 2022-11-18 | Stop reason: SDUPTHER

## 2022-11-15 NOTE — LETTER
November 15, 2022      [facility]  [address] [place] [address]  Phone: [phone]  Fax: [phone]       Patient: Chester Red   YOB: 1995  Date of Visit: 11/15/2022    To Whom It May Concern:    Danelle Red  was at [facility] on 11/15/2022. The patient may return to work/school on 11/16/2022 with no restrictions. If you have any questions or concerns, or if I can be of further assistance, please do not hesitate to contact me.    Sincerely,      MARYBETH Dunlap

## 2022-11-15 NOTE — PROGRESS NOTES
Subjective:       Patient ID: Chester Red Jr. is a 27 y.o. male.    Chief Complaint: Headache    Headache   This is a new problem. The current episode started 1 to 4 weeks ago. The problem occurs intermittently. The problem has been waxing and waning. The pain is located in the Left unilateral region. The pain radiates to the left neck. The pain quality is not similar to prior headaches. The quality of the pain is described as band-like. The pain is at a severity of 5/10 (worst 8/10). Associated symptoms include dizziness, a loss of balance, neck pain and phonophobia. Pertinent negatives include no blurred vision, ear pain, eye pain, eye redness, eye watering, facial sweating, fever, hearing loss, nausea, numbness, photophobia, rhinorrhea, scalp tenderness, seizures, sinus pressure, sore throat, tingling, tinnitus, visual change, vomiting or weakness.   Review of Systems   Constitutional:  Negative for activity change, appetite change, chills, diaphoresis, fatigue and fever.   HENT:  Negative for congestion, ear pain, hearing loss, rhinorrhea, sinus pressure, sore throat and tinnitus.    Eyes:  Negative for blurred vision, photophobia, pain, redness and visual disturbance.   Gastrointestinal:  Negative for nausea and vomiting.   Musculoskeletal:  Positive for neck pain. Negative for neck stiffness.   Neurological:  Positive for dizziness, headaches and loss of balance. Negative for tingling, tremors, seizures, syncope, facial asymmetry, speech difficulty, weakness, light-headedness and numbness.   Psychiatric/Behavioral:  Negative for self-injury, sleep disturbance and suicidal ideas. The patient is not nervous/anxious.      Objective:      Physical Exam  Vitals reviewed.   Constitutional:       General: He is not in acute distress.     Appearance: He is well-developed.   HENT:      Head: Normocephalic and atraumatic.      Right Ear: Tympanic membrane, ear canal and external ear normal.      Left Ear: Tympanic  "membrane, ear canal and external ear normal.   Eyes:      General: No scleral icterus.     Extraocular Movements:      Right eye: No nystagmus.      Left eye: No nystagmus.      Conjunctiva/sclera: Conjunctivae normal.   Neck:      Vascular: No carotid bruit.   Cardiovascular:      Rate and Rhythm: Normal rate and regular rhythm.      Heart sounds: Normal heart sounds.   Pulmonary:      Effort: Pulmonary effort is normal.      Breath sounds: Normal breath sounds.   Abdominal:      General: Bowel sounds are normal.      Palpations: Abdomen is soft.      Tenderness: There is no abdominal tenderness.   Musculoskeletal:      Right lower leg: No edema.      Left lower leg: No edema.   Neurological:      Mental Status: He is alert and oriented to person, place, and time.      Cranial Nerves: Cranial nerves 2-12 are intact.      Sensory: Sensation is intact.      Motor: Motor function is intact.      Coordination: Coordination is intact.      Gait: Gait is intact.   Psychiatric:         Behavior: Behavior is cooperative.       Assessment:       1. Tension headache    2. Essential hypertension    3. Gastroesophageal reflux disease, unspecified whether esophagitis present        Plan:       Chester was seen today for headache.    Diagnoses and all orders for this visit:    Tension headache  -     Ambulatory referral/consult to Physical Medicine Rehab; Future  -     tiZANidine (ZANAFLEX) 4 MG tablet; Take 1 tablet (4 mg total) by mouth every evening. for 10 days    Essential hypertension  At goal   Continue current medication   Gastroesophageal reflux disease, unspecified whether esophagitis present  -     omeprazole (PRILOSEC) 40 MG capsule; Take 1 capsule (40 mg total) by mouth every morning.                   Documentation entered by me for this encounter may have been done in part using speech-recognition technology. Although I have made an effort to ensure accuracy, "sound like" errors may exist and should be interpreted " in context.  I spent a total of 53 minutes on the day of the visit.This includes face to face time and non-face to face time preparing to see the patient (eg, review of tests), obtaining and/or reviewing separately obtained history, documenting clinical information in the electronic or other health record, independently interpreting results and communicating results to the patient/family/caregiver, or care coordinator.

## 2022-11-17 ENCOUNTER — HOSPITAL ENCOUNTER (EMERGENCY)
Facility: HOSPITAL | Age: 27
Discharge: HOME | End: 2022-11-17
Attending: EMERGENCY MEDICINE
Payer: COMMERCIAL

## 2022-11-17 VITALS
OXYGEN SATURATION: 98 % | HEIGHT: 71 IN | TEMPERATURE: 98 F | RESPIRATION RATE: 18 BRPM | HEART RATE: 75 BPM | SYSTOLIC BLOOD PRESSURE: 142 MMHG | WEIGHT: 270 LBS | DIASTOLIC BLOOD PRESSURE: 70 MMHG | BODY MASS INDEX: 37.8 KG/M2

## 2022-11-17 DIAGNOSIS — R42 POSTURAL DIZZINESS WITH NEAR SYNCOPE: ICD-10-CM

## 2022-11-17 DIAGNOSIS — R55 SYNCOPE AND COLLAPSE: ICD-10-CM

## 2022-11-17 DIAGNOSIS — E86.0 DEHYDRATION: Primary | ICD-10-CM

## 2022-11-17 DIAGNOSIS — R55 POSTURAL DIZZINESS WITH NEAR SYNCOPE: ICD-10-CM

## 2022-11-17 LAB
ALBUMIN SERPL BCP-MCNC: 4.6 G/DL (ref 3.5–5.2)
ALP SERPL-CCNC: 59 U/L (ref 55–135)
ALT SERPL W/O P-5'-P-CCNC: 26 U/L (ref 10–44)
AMPHET+METHAMPHET UR QL: NEGATIVE
ANION GAP SERPL CALC-SCNC: 11 MMOL/L (ref 8–16)
AST SERPL-CCNC: 34 U/L (ref 10–40)
BARBITURATES UR QL SCN>200 NG/ML: NEGATIVE
BASOPHILS # BLD AUTO: 0.05 K/UL (ref 0–0.2)
BASOPHILS NFR BLD: 0.9 % (ref 0–1.9)
BENZODIAZ UR QL SCN>200 NG/ML: NEGATIVE
BILIRUB SERPL-MCNC: 1.3 MG/DL (ref 0.1–1)
BILIRUB UR QL STRIP: NEGATIVE
BNP SERPL-MCNC: 11 PG/ML (ref 0–99)
BUN SERPL-MCNC: 13 MG/DL (ref 6–20)
BZE UR QL SCN: NEGATIVE
CALCIUM SERPL-MCNC: 9.3 MG/DL (ref 8.7–10.5)
CANNABINOIDS UR QL SCN: NEGATIVE
CHLORIDE SERPL-SCNC: 96 MMOL/L (ref 95–110)
CLARITY UR: CLEAR
CO2 SERPL-SCNC: 28 MMOL/L (ref 23–29)
COLOR UR: YELLOW
CREAT SERPL-MCNC: 1.2 MG/DL (ref 0.5–1.4)
CREAT UR-MCNC: 50 MG/DL (ref 23–375)
DIFFERENTIAL METHOD: ABNORMAL
EOSINOPHIL # BLD AUTO: 0.2 K/UL (ref 0–0.5)
EOSINOPHIL NFR BLD: 3.8 % (ref 0–8)
ERYTHROCYTE [DISTWIDTH] IN BLOOD BY AUTOMATED COUNT: 13.4 % (ref 11.5–14.5)
EST. GFR  (NO RACE VARIABLE): >60 ML/MIN/1.73 M^2
GLUCOSE SERPL-MCNC: 118 MG/DL (ref 70–110)
GLUCOSE UR QL STRIP: NEGATIVE
HCT VFR BLD AUTO: 41.1 % (ref 40–54)
HGB BLD-MCNC: 13.6 G/DL (ref 14–18)
HGB UR QL STRIP: NEGATIVE
IMM GRANULOCYTES # BLD AUTO: 0.01 K/UL (ref 0–0.04)
IMM GRANULOCYTES NFR BLD AUTO: 0.2 % (ref 0–0.5)
INFLUENZA A, MOLECULAR: NEGATIVE
INFLUENZA B, MOLECULAR: NEGATIVE
KETONES UR QL STRIP: NEGATIVE
LEUKOCYTE ESTERASE UR QL STRIP: NEGATIVE
LYMPHOCYTES # BLD AUTO: 2.4 K/UL (ref 1–4.8)
LYMPHOCYTES NFR BLD: 44.7 % (ref 18–48)
MAGNESIUM SERPL-MCNC: 1.8 MG/DL (ref 1.6–2.6)
MCH RBC QN AUTO: 27.1 PG (ref 27–31)
MCHC RBC AUTO-ENTMCNC: 33.1 G/DL (ref 32–36)
MCV RBC AUTO: 82 FL (ref 82–98)
MONOCYTES # BLD AUTO: 0.4 K/UL (ref 0.3–1)
MONOCYTES NFR BLD: 7.7 % (ref 4–15)
NEUTROPHILS # BLD AUTO: 2.3 K/UL (ref 1.8–7.7)
NEUTROPHILS NFR BLD: 42.7 % (ref 38–73)
NITRITE UR QL STRIP: NEGATIVE
NRBC BLD-RTO: 0 /100 WBC
OPIATES UR QL SCN: NEGATIVE
PCP UR QL SCN>25 NG/ML: NEGATIVE
PH UR STRIP: 7 [PH] (ref 5–8)
PLATELET # BLD AUTO: 333 K/UL (ref 150–450)
PMV BLD AUTO: 10.4 FL (ref 9.2–12.9)
POTASSIUM SERPL-SCNC: 3.6 MMOL/L (ref 3.5–5.1)
PROT SERPL-MCNC: 8.2 G/DL (ref 6–8.4)
PROT UR QL STRIP: NEGATIVE
RBC # BLD AUTO: 5.01 M/UL (ref 4.6–6.2)
SODIUM SERPL-SCNC: 135 MMOL/L (ref 136–145)
SP GR UR STRIP: <=1.005 (ref 1–1.03)
SPECIMEN SOURCE: NORMAL
TOXICOLOGY INFORMATION: NORMAL
TROPONIN I SERPL HS-MCNC: 5 PG/ML (ref 0–14.9)
TSH SERPL DL<=0.005 MIU/L-ACNC: 1.58 UIU/ML (ref 0.34–5.6)
URN SPEC COLLECT METH UR: NORMAL
UROBILINOGEN UR STRIP-ACNC: NEGATIVE EU/DL
WBC # BLD AUTO: 5.3 K/UL (ref 3.9–12.7)

## 2022-11-17 PROCEDURE — 81003 URINALYSIS AUTO W/O SCOPE: CPT | Mod: 59 | Performed by: NURSE PRACTITIONER

## 2022-11-17 PROCEDURE — 93010 EKG 12-LEAD: ICD-10-PCS | Mod: ,,, | Performed by: INTERNAL MEDICINE

## 2022-11-17 PROCEDURE — 87502 INFLUENZA DNA AMP PROBE: CPT | Performed by: NURSE PRACTITIONER

## 2022-11-17 PROCEDURE — 83880 ASSAY OF NATRIURETIC PEPTIDE: CPT | Performed by: NURSE PRACTITIONER

## 2022-11-17 PROCEDURE — 80053 COMPREHEN METABOLIC PANEL: CPT | Performed by: NURSE PRACTITIONER

## 2022-11-17 PROCEDURE — 99285 EMERGENCY DEPT VISIT HI MDM: CPT | Mod: 25

## 2022-11-17 PROCEDURE — 80307 DRUG TEST PRSMV CHEM ANLYZR: CPT | Performed by: NURSE PRACTITIONER

## 2022-11-17 PROCEDURE — 93010 ELECTROCARDIOGRAM REPORT: CPT | Mod: ,,, | Performed by: INTERNAL MEDICINE

## 2022-11-17 PROCEDURE — 84484 ASSAY OF TROPONIN QUANT: CPT | Performed by: NURSE PRACTITIONER

## 2022-11-17 PROCEDURE — 96360 HYDRATION IV INFUSION INIT: CPT

## 2022-11-17 PROCEDURE — 83735 ASSAY OF MAGNESIUM: CPT | Performed by: NURSE PRACTITIONER

## 2022-11-17 PROCEDURE — 85025 COMPLETE CBC W/AUTO DIFF WBC: CPT | Performed by: NURSE PRACTITIONER

## 2022-11-17 PROCEDURE — 25000003 PHARM REV CODE 250: Performed by: NURSE PRACTITIONER

## 2022-11-17 PROCEDURE — 93005 ELECTROCARDIOGRAM TRACING: CPT | Performed by: INTERNAL MEDICINE

## 2022-11-17 PROCEDURE — 84443 ASSAY THYROID STIM HORMONE: CPT | Performed by: NURSE PRACTITIONER

## 2022-11-17 RX ADMIN — SODIUM CHLORIDE 1000 ML: 0.9 INJECTION, SOLUTION INTRAVENOUS at 09:11

## 2022-11-18 ENCOUNTER — TELEPHONE (OUTPATIENT)
Dept: FAMILY MEDICINE | Facility: CLINIC | Age: 27
End: 2022-11-18
Payer: COMMERCIAL

## 2022-11-18 DIAGNOSIS — K21.9 GASTROESOPHAGEAL REFLUX DISEASE, UNSPECIFIED WHETHER ESOPHAGITIS PRESENT: ICD-10-CM

## 2022-11-18 RX ORDER — OMEPRAZOLE 40 MG/1
40 CAPSULE, DELAYED RELEASE ORAL EVERY MORNING
Qty: 90 CAPSULE | Refills: 0 | Status: SHIPPED | OUTPATIENT
Start: 2022-11-18 | End: 2023-01-13

## 2022-11-18 NOTE — ED NOTES
LOC: The patient is awake, alert and aware of environment with an appropriate affect, the patient is oriented x 3 and speaking appropriately. Pt reports high blood pressure reading at home along with syncopal episode hitting head prior to arrival tonight. Pt reports headache. Denies any other complaints upon arrival to ER.   APPEARANCE: Patient resting comfortably and in no acute distress, patient is clean and well groomed, patient's clothing properly fastened.  SKIN: The skin is warm and dry, color consistent with ethnicity, patient has normal skin turgor and moist mucus membranes, skin intact, no breakdown or brusing noted.  MUSKULOSKELETAL: Patient moving all extremities well, no obvious swelling or deformities noted.  RESPIRATORY: Airway is open and patent, respirations are spontaneous, patient has a normal effort and rate, no accessory muscle use noted.  CARDIAC: Patient has a normal rate and rhythm, no peripheral edema noted, capillary refill < 3 seconds.  ABDOMEN: Soft and non tender to palpation, no distention noted.  NEUROLOGIC: PERRL, 3mm bilaterally, eyes open spontaneously, behavior appropriate to situation, follows commands, facial expression symmetrical, bilateral hand grasp equal and even, purposeful motor response noted, normal sensation in all extremities when touched with a finger.

## 2022-11-18 NOTE — DISCHARGE INSTRUCTIONS
You were seen and evaluated in the ER today.  Your workup while you were here is reassuring for no acute causes of your symptoms.  Are slightly hypotensive when you stand up.  This could be due to dehydration.  We are going to treat you with IV fluids.  Please follow-up with an eye doctor for an eye exam that may contribute to your headaches.  Please follow-up with your PCP as needed.  Please return to the ED for any worsening symptoms such as chest pain, shortness of breath, fever not controlled with Tylenol or ibuprofen or uncontrolled pain.      Our goal in the emergency department is to always give you outstanding care and exceptional service. You may receive a survey by mail or e-mail in the next week regarding your experience in our ED. We would greatly appreciate your completing and returning the survey. Your feedback provides us with a way to recognize our staff who give very good care and it helps us learn how to improve when your experience was below our aspiration of excellence.

## 2022-11-18 NOTE — TELEPHONE ENCOUNTER
No new care gaps identified.  [facility] Embedded Care Gaps. Reference number: [id number]. 11/18/2022   10:51:24 AM CST

## 2022-11-18 NOTE — ED PROVIDER NOTES
Source of History:  Patient, chart    Chief complaint:  Loss of Consciousness (Passed out while walking at home just prior to arrival at ed. )      HPI:  Chester Red Jr. is a 27 y.o. male with medical history of hypertension presenting with syncopal episode while at home today.  Patient states that he was walking at home and became lightheaded and fell to the ground.  Patient denies any bowel or bladder incontinence at that time.  Patient states he has been increasingly fatigued over the past few days.  Patient also states he is had headache due to elevated blood pressure.  Patient states he has been compliant with his blood pressure medications.  Patient also endorsing upper epigastric pain.  Patient states he feels like it is GERD.  Patient denies taking anything for GERD.    This is the extent to the patients complaints today here in the emergency department.    ROS: As per HPI and below:    Constitutional: No fever.  No chills.  Eyes: No visual changes.  ENT: No sore throat. No ear pain    Cardiovascular: No chest pain.  Respiratory: No shortness of breath.  GI:  Positive for abdominal pain.  No nausea or vomiting.  Genitourinary: No abnormal urination.  Neurologic:  Positive for headache. No focal weakness.  No numbness.  Positive for lightheadedness.  Positive for near syncope.  MSK: no back pain.  Integument: No rashes or lesions.  Hematologic: No easy bruising.  Endocrine: No excessive thirst or urination.    Review of patient's allergies indicates:  No Known Allergies    PMH:  As per HPI and below:  Past Medical History:   Diagnosis Date    Acid reflux     Hypertension      Past Surgical History:   Procedure Laterality Date    ACHILLES TENDON SURGERY      ESOPHAGOGASTRODUODENOSCOPY N/A 8/1/2022    Procedure: EGD (ESOPHAGOGASTRODUODENOSCOPY);  Surgeon: Bubba Lu MD;  Location: [facility];  Service: Endoscopy;  Laterality: N/A;       Social History     Tobacco Use    Smoking status: Every Day      "Types: Vaping with nicotine    Smokeless tobacco: Never    Tobacco comments:     Cigar   Substance Use Topics    Alcohol use: Not Currently     Comment: stopped a few months ago    Drug use: No       Physical Exam:    BP (!) 142/70 (Patient Position: Standing)   Pulse 75   Temp 97.7 °F (36.5 °C) (Oral)   Resp 18   Ht 5' 11" (1.803 m)   Wt 122.5 kg (270 lb)   SpO2 98%   BMI 37.66 kg/m²     Nursing note and vital signs reviewed.    Constitutional: No acute distress.  Nontoxic.  Obese  Eyes: No conjunctival injection.Extraocular muscles are intact.  ENT: Oropharynx clear.  Normal phonation.  Mucous membranes pink and moist.    Cardiovascular: Regular rate and rhythm.  No murmurs. No gallops. No rubs  Respiratory: Clear to auscultation bilaterally.  Good air movement.  No wheezes.  No rhonchi. No rales. No accessory muscle use..  Abdomen: Soft.  Not distended.  Nontender.  No guarding.  No rebound. Non-peritoneal.  Musculoskeletal: Good range of motion all joints.  No deformities.  Neck supple.  No meningismus.  Skin: No rashes seen.  Good turgor.  No abrasions.  No ecchymoses.  Neurologic: Motor intact.  Sensation intact.  No ataxia. No focal neurological deficits.  Psych: Appropriate, conversant    Labs/Imaging that has been ordered has been reviewed by myself.    Labs Reviewed   COMPREHENSIVE METABOLIC PANEL - Abnormal; Notable for the following components:       Result Value    Sodium 135 (*)     Glucose 118 (*)     Total Bilirubin 1.3 (*)     All other components within normal limits   CBC W/ AUTO DIFFERENTIAL - Abnormal; Notable for the following components:    Hemoglobin 13.6 (*)     All other components within normal limits   TSH   B-TYPE NATRIURETIC PEPTIDE   MAGNESIUM   URINALYSIS, REFLEX TO URINE CULTURE    Narrative:     Specimen Source->Urine   DRUG SCREEN PANEL, URINE EMERGENCY    Narrative:     Specimen Source->Urine   TROPONIN I HIGH SENSITIVITY   INFLUENZA A AND B ANTIGEN    Narrative:     " Specimen Source->Nasopharyngeal Swab     Imaging Results              X-Ray Chest AP Portable (Final result)  Result time 11/17/22 20:02:37      Final result by Mike Hamilton MD (11/17/22 20:02:37)                   Narrative:    HISTORY: weakness    FINDINGS: Portable chest radiograph at 1926 hours compared to 11/11/2022 shows the cardiomediastinal silhouette and pulmonary vasculature are within normal limits.    The lungs are hypoinflated, with no consolidation, large pleural effusion, or evidence of pulmonary edema. No confluent infiltrates or pneumothorax. No acute osseous abnormalities.    IMPRESSION: No evidence of active cardiopulmonary disease.    Electronically signed by:  Mike Hamilton MD  11/17/2022 8:02 PM CST Workstation: [id number]                                     CT Head Without Contrast (Final result)  Result time 11/17/22 19:43:57      Final result by Mike Hamilton MD (11/17/22 19:43:57)                   Narrative:    CMS MANDATED QUALITY DATA-CT RADIATION DOSE-436  All CT scans at this facility dose modulation, iterative reconstruction, and or weight-based dosing when appropriate to reduce radiation dose to as low as reasonably achievable.    HISTORY: Syncope, recurrent    FINDINGS: No prior studies for comparison. There is no acute intracranial hemorrhage, with no mass effect or abnormal extra-axial fluid. Gray white differentiation is maintained, with the cortical sulci, ventricles and basal cisterns normal in size for the patient's age.    The cerebellum and brainstem are unremarkable. There is paranasal sinus mucosal thickening. The mastoid air cells are clear. There is no acute osseous abnormality.    IMPRESSION: Negative noncontrast head CT.    Electronically signed by:  Mike Hamilton MD  11/17/2022 7:43 PM CST Workstation: [id number]                                  I decided to obtain the patient's medical records.      MDM/ Differential Dx:    Emergent evaluation of an obese 28 yo  female presenting for near syncopal episode at home today.  Patient states he was walking through his house to get his jacket only became lightheaded and fell to the ground.  Patient denies hitting head or loss of consciousness.  Patient states he is had episodic headaches over the past few months.  Patient states he is not been eating or drinking much due to decreased appetite recently.  On exam pt is A&Ox3. VSS. Nonfebrile and nontoxic appearing. Breath sounds clear bilaterally. Mucous membranes pink and moist. Tonsils with no redness, erythema or exudates. Abdomen soft and nontender. No rebound or guarding appreciated on exam.   BS WNL.  Pt speaking in full sentences.  Steady gait appreciated. Cap refill < 3 seconds.      Differential diagnoses include but are not limited to arrhythmia, stroke, vasovagal episode, dehydration, others.       I will get labs, imaging, hydrate and reassess.  I discussed this case with my supervising physician.     ED Course as of 11/17/22 2216   Thu Nov 17, 2022 1930 EKG shows normal sinus rhythm vent rate of 88.  No STEMI.   [RZ]   2030 CT head negative for any acute abnormalities.  Chest x-ray negative for any acute abnormalities. [RZ]   2135 CBC unremarkable.  No leukocytosis noted.  H&H stable at 13.6 and 41.1.  CMP unremarkable.  Mag within normal limits.  Troponin negative.  Influenza negative.  BNP within normal limits.  TSH within normal limits.  UA negative for any acute infectious process.  Patient is orthostatic positive.  Will give IV fluids and reassessed.  Patient and girlfriend updated on results.  Recommend getting an eye exam for chronic episodal headaches.  Awaiting reassessment after IV fluids. [RZ]   2202 Patient reassessed after IV fluids.  Patient states feeling better.  Patient advised to continue to stay hydrated.  Follow-up with ophthalmology for eye exam.  Strict return to ED precautions discussed.  Patient verbalized understanding of this plan of care.   All questions and concerns addressed. [RZ]   2203 Patient is hemodynamically stable, vital signs are normal. Discharge instructions given. Return to ED precautions discussed. Follow up as directed. Pt verbalized understanding of this plan.  Pt is stable for discharge.  [RZ]      ED Course User Index  [RZ] Marylin Haji NP                 Diagnostic Impression:    1. Dehydration    2. Syncope and collapse    3. Postural dizziness with near syncope         ED Disposition Condition    Discharge Stable            ED Prescriptions    None       Follow-up Information       Follow up With Specialties Details Why Contact Info    Armando Mckeon MD Family Medicine Schedule an appointment as soon as possible for a visit  As needed [address] [facility] [address]  [phone]                 Marylin Haji NP  11/17/22 2880

## 2022-11-18 NOTE — TELEPHONE ENCOUNTER
----- Message from Shahzad Roman sent at 11/18/2022 11:48 AM CST -----  Contact: pt at [phone]  Type: Needs Medical Advice  Who Called:  pt   Best Call Back Number: [phone]    Additional Information:   [id number] [place] - [address] AT [facility] [address]  [address] [address]  Phone: [phone] Fax: [phone]    Pt called in to see about  his medication that is suppose to be sent over to the pharmacy listed below please call back to advise

## 2022-11-18 NOTE — TELEPHONE ENCOUNTER
----- Message from Sully Estradaniranjan sent at 11/18/2022  9:51 AM CST -----  Contact: self  Patient just saw you on 11/15 and you were to order him omeprazole (PRILOSEC) 40 MG capsule but it was not sent to his pharm.  Please get this sent to the following and call patient back to advise at [phone] and thanks    [id number] - [place] - [address] AT [facility] [address]  [address] [address]  Phone: [phone] Fax: [phone]

## 2022-11-28 ENCOUNTER — PATIENT MESSAGE (OUTPATIENT)
Dept: PHYSICAL MEDICINE AND REHAB | Facility: CLINIC | Age: 27
End: 2022-11-28
Payer: COMMERCIAL

## 2022-11-28 ENCOUNTER — TELEPHONE (OUTPATIENT)
Dept: PHYSICAL MEDICINE AND REHAB | Facility: CLINIC | Age: 27
End: 2022-11-28
Payer: COMMERCIAL

## 2022-11-29 ENCOUNTER — TELEPHONE (OUTPATIENT)
Dept: FAMILY MEDICINE | Facility: CLINIC | Age: 27
End: 2022-11-29
Payer: COMMERCIAL

## 2022-11-29 NOTE — TELEPHONE ENCOUNTER
----- Message from Christin Arrington sent at 11/29/2022 11:17 AM CST -----  .Type:  Patient Call Back    Who Called: PT       Does the patient know what this is regarding?: PT APPOINTMENT DAVE WAS CANCELED HE SHOULDN'T HAVE BEEN SCHEDULED WITH HER SHE DOESN'T SEE HEADACHES PT TOOK OFFICE FROM WORK HE NEEDS A WORK NOTE AND SPEAK WITH THE OFFICE     Would the patient rather a call back YES     Best Call Back Number: [phone]    Additional Information: Thank You

## 2022-11-29 NOTE — TELEPHONE ENCOUNTER
Spoke to pt states he needs a work excuse for today due to having appt with PM&R appt. Pt was called and appt was cancelled as Dr. Hair does not treat headaches. I can complete work excuse if ok with you. Also need new referral or next step in tension headache treatments.

## 2022-11-30 ENCOUNTER — PATIENT MESSAGE (OUTPATIENT)
Dept: FAMILY MEDICINE | Facility: CLINIC | Age: 27
End: 2022-11-30
Payer: COMMERCIAL

## 2022-11-30 ENCOUNTER — TELEPHONE (OUTPATIENT)
Dept: FAMILY MEDICINE | Facility: CLINIC | Age: 27
End: 2022-11-30
Payer: COMMERCIAL

## 2022-11-30 NOTE — TELEPHONE ENCOUNTER
----- Message from Diana Chris sent at 11/30/2022  7:07 AM CST -----  Regarding: Work Excuse  Contact: Patient  Patient left message yesterday regarding work excuse, want to know if office can send that this morning please, if so please post to FONU2 any questions please call back at [phone] (home)

## 2022-12-08 ENCOUNTER — OFFICE VISIT (OUTPATIENT)
Dept: FAMILY MEDICINE | Facility: CLINIC | Age: 27
End: 2022-12-08
Payer: COMMERCIAL

## 2022-12-08 ENCOUNTER — TELEPHONE (OUTPATIENT)
Dept: PHYSICAL MEDICINE AND REHAB | Facility: CLINIC | Age: 27
End: 2022-12-08
Payer: COMMERCIAL

## 2022-12-08 VITALS
HEIGHT: 71 IN | OXYGEN SATURATION: 96 % | SYSTOLIC BLOOD PRESSURE: 126 MMHG | RESPIRATION RATE: 18 BRPM | DIASTOLIC BLOOD PRESSURE: 80 MMHG | HEART RATE: 64 BPM | BODY MASS INDEX: 33.86 KG/M2 | WEIGHT: 241.88 LBS

## 2022-12-08 DIAGNOSIS — Z00.00 ROUTINE GENERAL MEDICAL EXAMINATION AT A HEALTH CARE FACILITY: Primary | ICD-10-CM

## 2022-12-08 DIAGNOSIS — K21.9 GASTROESOPHAGEAL REFLUX DISEASE, UNSPECIFIED WHETHER ESOPHAGITIS PRESENT: ICD-10-CM

## 2022-12-08 DIAGNOSIS — R55 RECURRENT SYNCOPE: ICD-10-CM

## 2022-12-08 PROCEDURE — 3074F PR MOST RECENT SYSTOLIC BLOOD PRESSURE < 130 MM HG: ICD-10-PCS | Mod: CPTII,S$GLB,, | Performed by: STUDENT IN AN ORGANIZED HEALTH CARE EDUCATION/TRAINING PROGRAM

## 2022-12-08 PROCEDURE — 99999 PR PBB SHADOW E&M-EST. PATIENT-LVL IV: CPT | Mod: PBBFAC,,, | Performed by: STUDENT IN AN ORGANIZED HEALTH CARE EDUCATION/TRAINING PROGRAM

## 2022-12-08 PROCEDURE — 1160F PR REVIEW ALL MEDS BY PRESCRIBER/CLIN PHARMACIST DOCUMENTED: ICD-10-PCS | Mod: CPTII,S$GLB,, | Performed by: STUDENT IN AN ORGANIZED HEALTH CARE EDUCATION/TRAINING PROGRAM

## 2022-12-08 PROCEDURE — 1159F MED LIST DOCD IN RCRD: CPT | Mod: CPTII,S$GLB,, | Performed by: STUDENT IN AN ORGANIZED HEALTH CARE EDUCATION/TRAINING PROGRAM

## 2022-12-08 PROCEDURE — 1160F RVW MEDS BY RX/DR IN RCRD: CPT | Mod: CPTII,S$GLB,, | Performed by: STUDENT IN AN ORGANIZED HEALTH CARE EDUCATION/TRAINING PROGRAM

## 2022-12-08 PROCEDURE — 3008F BODY MASS INDEX DOCD: CPT | Mod: CPTII,S$GLB,, | Performed by: STUDENT IN AN ORGANIZED HEALTH CARE EDUCATION/TRAINING PROGRAM

## 2022-12-08 PROCEDURE — 3044F HG A1C LEVEL LT 7.0%: CPT | Mod: CPTII,S$GLB,, | Performed by: STUDENT IN AN ORGANIZED HEALTH CARE EDUCATION/TRAINING PROGRAM

## 2022-12-08 PROCEDURE — 3044F PR MOST RECENT HEMOGLOBIN A1C LEVEL <7.0%: ICD-10-PCS | Mod: CPTII,S$GLB,, | Performed by: STUDENT IN AN ORGANIZED HEALTH CARE EDUCATION/TRAINING PROGRAM

## 2022-12-08 PROCEDURE — 3079F DIAST BP 80-89 MM HG: CPT | Mod: CPTII,S$GLB,, | Performed by: STUDENT IN AN ORGANIZED HEALTH CARE EDUCATION/TRAINING PROGRAM

## 2022-12-08 PROCEDURE — 99395 PREV VISIT EST AGE 18-39: CPT | Mod: S$GLB,,, | Performed by: STUDENT IN AN ORGANIZED HEALTH CARE EDUCATION/TRAINING PROGRAM

## 2022-12-08 PROCEDURE — 3074F SYST BP LT 130 MM HG: CPT | Mod: CPTII,S$GLB,, | Performed by: STUDENT IN AN ORGANIZED HEALTH CARE EDUCATION/TRAINING PROGRAM

## 2022-12-08 PROCEDURE — 99999 PR PBB SHADOW E&M-EST. PATIENT-LVL IV: ICD-10-PCS | Mod: PBBFAC,,, | Performed by: STUDENT IN AN ORGANIZED HEALTH CARE EDUCATION/TRAINING PROGRAM

## 2022-12-08 PROCEDURE — 99395 PR PREVENTIVE VISIT,EST,18-39: ICD-10-PCS | Mod: S$GLB,,, | Performed by: STUDENT IN AN ORGANIZED HEALTH CARE EDUCATION/TRAINING PROGRAM

## 2022-12-08 PROCEDURE — 1159F PR MEDICATION LIST DOCUMENTED IN MEDICAL RECORD: ICD-10-PCS | Mod: CPTII,S$GLB,, | Performed by: STUDENT IN AN ORGANIZED HEALTH CARE EDUCATION/TRAINING PROGRAM

## 2022-12-08 PROCEDURE — 3079F PR MOST RECENT DIASTOLIC BLOOD PRESSURE 80-89 MM HG: ICD-10-PCS | Mod: CPTII,S$GLB,, | Performed by: STUDENT IN AN ORGANIZED HEALTH CARE EDUCATION/TRAINING PROGRAM

## 2022-12-08 PROCEDURE — 3008F PR BODY MASS INDEX (BMI) DOCUMENTED: ICD-10-PCS | Mod: CPTII,S$GLB,, | Performed by: STUDENT IN AN ORGANIZED HEALTH CARE EDUCATION/TRAINING PROGRAM

## 2022-12-08 RX ORDER — SUCRALFATE 1 G/1
1 TABLET ORAL 4 TIMES DAILY
Qty: 40 TABLET | Refills: 0 | Status: SHIPPED | OUTPATIENT
Start: 2022-12-08 | End: 2022-12-18

## 2022-12-08 NOTE — TELEPHONE ENCOUNTER
Spoke with patient to advise of need to cancel NP appt  as no f/u care will be available, verbalizes understanding.

## 2022-12-08 NOTE — PROGRESS NOTES
"[facility] MEDICINE CLINIC NOTE    Patient Name: Chester Red Jr.  YOB: 1995    PRESENTING HISTORY     History of Present Illness:  Mr. Chester Red Jr. is a 27 y.o. male here for annual.     He has several complaints/concerns:    He has had several episodes of syncope or near syncope associated with heart racing.   Recently seen in ED for this. Extensive workup unrevealing.   Holter monitor in summer negative.   Has never seen a Cardiologist before.     Has had periodic issues with HAs.   Recently seen by Pamela Whitfield. Takes muscle relaxants PRN with relief. These occur occasionally and not during syncopal episodes.   Recent CT head WNL.     Also has active esophagogastritis.   Still has heartburn despite BID PPI,but it has improved from a 10 to an 8.   Recent EGD 3 months ago.     Intermittent tightness/knots in upper back and neck.     Blood pressure well controlled.   Lipids excellent.   BG reasonable on recent checks.   He has lost some weight.     Fhx: no colon cancer. 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     ROS      OBJECTIVE:   Vital Signs:  Vitals:    12/08/22 0822   BP: 126/80   Pulse: 64   Resp: 18   SpO2: 96%   Weight: 109.7 kg (241 lb 13.5 oz)   Height: 5' 11" (1.803 m)          Physical Exam: Normal, no change.     Physical Exam    ASSESSMENT & PLAN:     Routine general medical examination at a health care facility  Routine cancer screening.   CV risk factors reasonably well controlled.   Avoid smoking, exercise regularly.     Recurrent syncope  -     Ambulatory referral/consult to Cardiology; Future; Expected date: 12/15/2022  I suspect he has paroxysmal arrhythmias which will require longer EKG monitoring than I can order.     Gastroesophageal reflux disease, unspecified whether esophagitis present  -     sucralfate (CARAFATE) 1 gram tablet; Take 1 tablet (1 g total) by " mouth 4 (four) times daily. for 10 days  Dispense: 40 tablet; Refill: 0           Armando Mckeon MD   Internal Medicine

## 2023-01-13 DIAGNOSIS — K21.9 GASTROESOPHAGEAL REFLUX DISEASE, UNSPECIFIED WHETHER ESOPHAGITIS PRESENT: ICD-10-CM

## 2023-01-13 RX ORDER — OMEPRAZOLE 40 MG/1
40 CAPSULE, DELAYED RELEASE ORAL EVERY MORNING
Qty: 90 CAPSULE | Refills: 0 | Status: CANCELLED | OUTPATIENT
Start: 2023-01-13 | End: 2023-04-13

## 2023-02-09 ENCOUNTER — OFFICE VISIT (OUTPATIENT)
Dept: CARDIOLOGY | Facility: CLINIC | Age: 28
End: 2023-02-09
Payer: COMMERCIAL

## 2023-02-09 VITALS
DIASTOLIC BLOOD PRESSURE: 80 MMHG | OXYGEN SATURATION: 97 % | HEIGHT: 71 IN | BODY MASS INDEX: 33.95 KG/M2 | HEART RATE: 74 BPM | SYSTOLIC BLOOD PRESSURE: 130 MMHG | WEIGHT: 242.5 LBS

## 2023-02-09 DIAGNOSIS — R00.2 PALPITATIONS: Primary | ICD-10-CM

## 2023-02-09 DIAGNOSIS — R14.2 BURPING: ICD-10-CM

## 2023-02-09 DIAGNOSIS — R07.89 CHEST DISCOMFORT: ICD-10-CM

## 2023-02-09 DIAGNOSIS — R55 SYNCOPE, UNSPECIFIED SYNCOPE TYPE: ICD-10-CM

## 2023-02-09 PROCEDURE — 3008F PR BODY MASS INDEX (BMI) DOCUMENTED: ICD-10-PCS | Mod: CPTII,S$GLB,, | Performed by: INTERNAL MEDICINE

## 2023-02-09 PROCEDURE — 3075F PR MOST RECENT SYSTOLIC BLOOD PRESS GE 130-139MM HG: ICD-10-PCS | Mod: CPTII,S$GLB,, | Performed by: INTERNAL MEDICINE

## 2023-02-09 PROCEDURE — 99999 PR PBB SHADOW E&M-EST. PATIENT-LVL IV: CPT | Mod: PBBFAC,,, | Performed by: INTERNAL MEDICINE

## 2023-02-09 PROCEDURE — 93000 EKG 12-LEAD: ICD-10-PCS | Mod: S$GLB,,, | Performed by: INTERNAL MEDICINE

## 2023-02-09 PROCEDURE — 3008F BODY MASS INDEX DOCD: CPT | Mod: CPTII,S$GLB,, | Performed by: INTERNAL MEDICINE

## 2023-02-09 PROCEDURE — 93000 ELECTROCARDIOGRAM COMPLETE: CPT | Mod: S$GLB,,, | Performed by: INTERNAL MEDICINE

## 2023-02-09 PROCEDURE — 3079F DIAST BP 80-89 MM HG: CPT | Mod: CPTII,S$GLB,, | Performed by: INTERNAL MEDICINE

## 2023-02-09 PROCEDURE — 3075F SYST BP GE 130 - 139MM HG: CPT | Mod: CPTII,S$GLB,, | Performed by: INTERNAL MEDICINE

## 2023-02-09 PROCEDURE — 99999 PR PBB SHADOW E&M-EST. PATIENT-LVL IV: ICD-10-PCS | Mod: PBBFAC,,, | Performed by: INTERNAL MEDICINE

## 2023-02-09 PROCEDURE — 3079F PR MOST RECENT DIASTOLIC BLOOD PRESSURE 80-89 MM HG: ICD-10-PCS | Mod: CPTII,S$GLB,, | Performed by: INTERNAL MEDICINE

## 2023-02-09 PROCEDURE — 99203 PR OFFICE/OUTPT VISIT, NEW, LEVL III, 30-44 MIN: ICD-10-PCS | Mod: S$GLB,,, | Performed by: INTERNAL MEDICINE

## 2023-02-09 PROCEDURE — 99203 OFFICE O/P NEW LOW 30 MIN: CPT | Mod: S$GLB,,, | Performed by: INTERNAL MEDICINE

## 2023-02-09 PROCEDURE — 1159F MED LIST DOCD IN RCRD: CPT | Mod: CPTII,S$GLB,, | Performed by: INTERNAL MEDICINE

## 2023-02-09 PROCEDURE — 1159F PR MEDICATION LIST DOCUMENTED IN MEDICAL RECORD: ICD-10-PCS | Mod: CPTII,S$GLB,, | Performed by: INTERNAL MEDICINE

## 2023-02-09 NOTE — PROGRESS NOTES
[facility]    Subjective:     Patient ID:  Chester Red Jr. is a 27 y.o. male patient here for evaluation Irregular Heart Beat (Patient sates had for 3 years )      HPI:  27 year old male here for for evaluation of syncope, palpitations as well as chest discomfort.  Patient reports a few months ago he was eating dinner and suddenly started developing palpitations with dizziness and lightheadedness.  He got up to lay down in his bed but continued having the symptoms and also reports some room spinning around his head.  He eventually got up to put his check it out to go to the ER and passed out.  His mother held him and prevented his head from hitting the floor.  Patient reports he was out not too long and reportedly felt some degree of weakness after waking up.  He reports he is not had any episodes since then.  Does not report any dizziness or lightheadedness on getting up from a lying down position.  He does report getting burning chest discomfort every other day which is worse with exertion and is associated with excessive burping.  He had a Holter monitor done for syncope which was negative for any significant arrhythmias.    Review of Systems   All other systems reviewed and are negative.     Past Medical History:   Diagnosis Date    Acid reflux     Hypertension        Past Surgical History:   Procedure Laterality Date    ACHILLES TENDON SURGERY      ESOPHAGOGASTRODUODENOSCOPY N/A 8/1/2022    Procedure: EGD (ESOPHAGOGASTRODUODENOSCOPY);  Surgeon: Bubba Lu MD;  Location: [facility];  Service: Endoscopy;  Laterality: N/A;       Family History   Problem Relation Age of Onset    Prostate cancer Paternal Grandfather     Colon cancer Neg Hx     Colon polyps Neg Hx     Esophageal cancer Neg Hx     Stomach cancer Neg Hx        Social History     Socioeconomic History    Marital status: Single   Tobacco Use    Smoking status: Every Day     Types:  Vaping with nicotine    Smokeless tobacco: Never    Tobacco comments:     Cigar   Substance and Sexual Activity    Alcohol use: Not Currently     Comment: stopped a few months ago    Drug use: No    Sexual activity: Never       Current Outpatient Medications   Medication Sig Dispense Refill    amLODIPine (NORVASC) 5 MG tablet Take 1 tablet (5 mg total) by mouth once daily. 90 tablet 3    ibuprofen (ADVIL,MOTRIN) 800 MG tablet Take 800 mg by mouth 3 (three) times daily.      omeprazole (PRILOSEC) 40 MG capsule Take 1 capsule (40 mg total) by mouth every morning. 90 capsule 0     No current facility-administered medications for this visit.       Review of patient's allergies indicates:  No Known Allergies      Objective:        Vitals:    02/09/23 0812   BP: 130/80   Pulse: 74       Physical Exam  Vitals reviewed.   Constitutional:       Appearance: Normal appearance.   HENT:      Mouth/Throat:      Mouth: Mucous membranes are moist.   Eyes:      Extraocular Movements: Extraocular movements intact.      Pupils: Pupils are equal, round, and reactive to light.   Cardiovascular:      Rate and Rhythm: Normal rate and regular rhythm.      Pulses: Normal pulses.      Heart sounds: Normal heart sounds. No murmur heard.    No gallop.   Pulmonary:      Effort: Pulmonary effort is normal.      Breath sounds: Normal breath sounds.   Abdominal:      General: Bowel sounds are normal.      Palpations: Abdomen is soft.   Musculoskeletal:         General: Normal range of motion.      Cervical back: Normal range of motion.   Skin:     General: Skin is warm and dry.   Neurological:      General: No focal deficit present.      Mental Status: He is alert and oriented to person, place, and time.   Psychiatric:         Mood and Affect: Mood normal.       LIPIDS - LAST 2   Lab Results   Component Value Date    CHOL 129 03/21/2022    HDL 36 (L) 03/21/2022    LDLCALC 68.6 03/21/2022    TRIG 122 03/21/2022    CHOLHDL 27.9 03/21/2022       CBC  - LAST 2  Lab Results   Component Value Date    WBC 5.30 11/17/2022    WBC 5.95 11/11/2022    RBC 5.01 11/17/2022    RBC 5.02 11/11/2022    HGB 13.6 (L) 11/17/2022    HGB 13.6 (L) 11/11/2022    HCT 41.1 11/17/2022    HCT 41.2 11/11/2022    MCV 82 11/17/2022    MCV 82 11/11/2022    MCH 27.1 11/17/2022    MCH 27.1 11/11/2022    MCHC 33.1 11/17/2022    MCHC 33.0 11/11/2022    RDW 13.4 11/17/2022    RDW 13.5 11/11/2022     11/17/2022     11/11/2022    MPV 10.4 11/17/2022    MPV 9.9 11/11/2022    GRAN 2.3 11/17/2022    GRAN 42.7 11/17/2022    LYMPH 2.4 11/17/2022    LYMPH 44.7 11/17/2022    MONO 0.4 11/17/2022    MONO 7.7 11/17/2022    BASO 0.05 11/17/2022    BASO 0.05 11/11/2022    NRBC 0 11/17/2022    NRBC 0 11/11/2022       CHEMISTRY & LIVER FUNCTION - LAST 2  Lab Results   Component Value Date     (L) 11/17/2022     11/11/2022    K 3.6 11/17/2022    K 3.5 11/11/2022    CL 96 11/17/2022     11/11/2022    CO2 28 11/17/2022    CO2 28 11/11/2022    ANIONGAP 11 11/17/2022    ANIONGAP 6 (L) 11/11/2022    BUN 13 11/17/2022    BUN 16 11/11/2022    CREATININE 1.2 11/17/2022    CREATININE 1.2 11/11/2022     (H) 11/17/2022    GLU 99 11/11/2022    CALCIUM 9.3 11/17/2022    CALCIUM 9.2 11/11/2022    MG 1.8 11/17/2022    ALBUMIN 4.6 11/17/2022    ALBUMIN 4.5 11/11/2022    PROT 8.2 11/17/2022    PROT 7.7 11/11/2022    ALKPHOS 59 11/17/2022    ALKPHOS 49 (L) 11/11/2022    ALT 26 11/17/2022    ALT 28 11/11/2022    AST 34 11/17/2022    AST 24 11/11/2022    BILITOT 1.3 (H) 11/17/2022    BILITOT 0.8 11/11/2022        CARDIAC PROFILE - LAST 2  Lab Results   Component Value Date    BNP 11 11/17/2022    BNP 11 11/11/2022    TROPONINI <0.030 11/12/2022    TROPONINI <0.030 11/11/2022        COAGULATION - LAST 2  No results found for: LABPT, INR, APTT    ENDOCRINE & PSA - LAST 2  Lab Results   Component Value Date    HGBA1C 5.6 03/21/2022    TSH 1.580 11/17/2022        ECHOCARDIOGRAM RESULTS  No results  found for this or any previous visit.      CURRENT/PREVIOUS VISIT EKG  Results for orders placed or performed during the hospital encounter of 11/17/22   EKG 12-lead    Collection Time: 11/17/22  7:14 PM    Narrative    Test Reason : R55,    Vent. Rate : 088 BPM     Atrial Rate : 088 BPM     P-R Int : 168 ms          QRS Dur : 104 ms      QT Int : 364 ms       P-R-T Axes : -11 -30 012 degrees     QTc Int : 440 ms    Normal sinus rhythm  Left axis deviation  Abnormal ECG  When compared with ECG of 11-NOV-2022 22:39,  No significant change was found  Confirmed by Roderick Mittal MD ([id number]) on 11/18/2022 5:37:00 PM    Referred By: KELIN   SELF           Confirmed By:Roderick Mittal MD     No valid procedures specified.   No results found for this or any previous visit.    No valid procedures specified.        Assessment:       1. Palpitations    2. Syncope, unspecified syncope type    3. Chest discomfort    4. Burping             Plan:       Palpitations  -     IN OFFICE EKG 12-LEAD (to Muse)  -     Exercise Stress - EKG    Syncope, unspecified syncope type  -     Ambulatory referral/consult to Cardiology  -     IN OFFICE EKG 12-LEAD (to Muse)  -     Echo; Future  -     Exercise Stress - EKG    Chest discomfort  -     Ambulatory referral/consult to Gastroenterology; Future; Expected date: 02/16/2023  -     Exercise Stress - EKG    Burping  -     Ambulatory referral/consult to Gastroenterology; Future; Expected date: 02/16/2023    Unsure of the exact etiology of the syncope.  Symptoms have largely resolved.  Discussed with patient regarding further monitoring including possible loop recorder.  Patient would like to defer any further management until he has worsening symptoms for the same.    For his burning discomfort, it appears to be related to reflux disease.  Will refer him to gastroenterologist.  Will evaluate this further with an echocardiogram and exercise stress test especially given the history of  syncope.    Patient instructed to come to the ER if he has worsening symptoms.    Advised tobacco cessation.    Follow up in 4 weeks (on 3/9/2023) for f/u echo, stress test.          MD [facility] [place]  [place]

## 2023-02-13 ENCOUNTER — TELEPHONE (OUTPATIENT)
Dept: CARDIOLOGY | Facility: HOSPITAL | Age: 28
End: 2023-02-13

## 2023-02-13 NOTE — TELEPHONE ENCOUNTER
Patient advised, test will be at [facility] ([address]   Will need to register on the first floor at the main entrance.   Patient advised that arrival time is 8:45am.  Patient advised, may take his medications prior to testing if you need to.  Advised if he needs to eat to take his medications, please keep it light, like toast and juice.    Patient advised to avoid all caffeine 12 hours prior to testing.  This includes decaf tea and coffee.    Wear comfortable clothing.   No lotions, oils, or powders to the upper chest area. May wear deodorant.    Patient verbalizes understanding of instructions.

## 2023-04-03 ENCOUNTER — OFFICE VISIT (OUTPATIENT)
Dept: FAMILY MEDICINE | Facility: CLINIC | Age: 28
End: 2023-04-03
Payer: COMMERCIAL

## 2023-04-03 ENCOUNTER — TELEPHONE (OUTPATIENT)
Dept: FAMILY MEDICINE | Facility: CLINIC | Age: 28
End: 2023-04-03
Payer: COMMERCIAL

## 2023-04-03 VITALS
TEMPERATURE: 98 F | OXYGEN SATURATION: 97 % | HEIGHT: 71 IN | WEIGHT: 246.69 LBS | HEART RATE: 91 BPM | DIASTOLIC BLOOD PRESSURE: 80 MMHG | SYSTOLIC BLOOD PRESSURE: 140 MMHG | BODY MASS INDEX: 34.54 KG/M2 | RESPIRATION RATE: 16 BRPM

## 2023-04-03 DIAGNOSIS — K21.9 GASTROESOPHAGEAL REFLUX DISEASE, UNSPECIFIED WHETHER ESOPHAGITIS PRESENT: ICD-10-CM

## 2023-04-03 DIAGNOSIS — M62.838 CERVICAL PARASPINAL MUSCLE SPASM: ICD-10-CM

## 2023-04-03 DIAGNOSIS — G44.209 MUSCLE TENSION HEADACHE: Primary | ICD-10-CM

## 2023-04-03 DIAGNOSIS — K21.00 GASTROESOPHAGEAL REFLUX DISEASE WITH ESOPHAGITIS WITHOUT HEMORRHAGE: ICD-10-CM

## 2023-04-03 DIAGNOSIS — M54.2 NECK PAIN: ICD-10-CM

## 2023-04-03 PROCEDURE — 1160F RVW MEDS BY RX/DR IN RCRD: CPT | Mod: CPTII,S$GLB,, | Performed by: PHYSICIAN ASSISTANT

## 2023-04-03 PROCEDURE — 99214 PR OFFICE/OUTPT VISIT, EST, LEVL IV, 30-39 MIN: ICD-10-PCS | Mod: S$GLB,,, | Performed by: PHYSICIAN ASSISTANT

## 2023-04-03 PROCEDURE — 3079F PR MOST RECENT DIASTOLIC BLOOD PRESSURE 80-89 MM HG: ICD-10-PCS | Mod: CPTII,S$GLB,, | Performed by: PHYSICIAN ASSISTANT

## 2023-04-03 PROCEDURE — 99999 PR PBB SHADOW E&M-EST. PATIENT-LVL III: ICD-10-PCS | Mod: PBBFAC,,, | Performed by: PHYSICIAN ASSISTANT

## 2023-04-03 PROCEDURE — 99999 PR PBB SHADOW E&M-EST. PATIENT-LVL III: CPT | Mod: PBBFAC,,, | Performed by: PHYSICIAN ASSISTANT

## 2023-04-03 PROCEDURE — 3008F BODY MASS INDEX DOCD: CPT | Mod: CPTII,S$GLB,, | Performed by: PHYSICIAN ASSISTANT

## 2023-04-03 PROCEDURE — 3079F DIAST BP 80-89 MM HG: CPT | Mod: CPTII,S$GLB,, | Performed by: PHYSICIAN ASSISTANT

## 2023-04-03 PROCEDURE — 1159F MED LIST DOCD IN RCRD: CPT | Mod: CPTII,S$GLB,, | Performed by: PHYSICIAN ASSISTANT

## 2023-04-03 PROCEDURE — 1159F PR MEDICATION LIST DOCUMENTED IN MEDICAL RECORD: ICD-10-PCS | Mod: CPTII,S$GLB,, | Performed by: PHYSICIAN ASSISTANT

## 2023-04-03 PROCEDURE — 99214 OFFICE O/P EST MOD 30 MIN: CPT | Mod: S$GLB,,, | Performed by: PHYSICIAN ASSISTANT

## 2023-04-03 PROCEDURE — 3077F PR MOST RECENT SYSTOLIC BLOOD PRESSURE >= 140 MM HG: ICD-10-PCS | Mod: CPTII,S$GLB,, | Performed by: PHYSICIAN ASSISTANT

## 2023-04-03 PROCEDURE — 3008F PR BODY MASS INDEX (BMI) DOCUMENTED: ICD-10-PCS | Mod: CPTII,S$GLB,, | Performed by: PHYSICIAN ASSISTANT

## 2023-04-03 PROCEDURE — 3077F SYST BP >= 140 MM HG: CPT | Mod: CPTII,S$GLB,, | Performed by: PHYSICIAN ASSISTANT

## 2023-04-03 PROCEDURE — 1160F PR REVIEW ALL MEDS BY PRESCRIBER/CLIN PHARMACIST DOCUMENTED: ICD-10-PCS | Mod: CPTII,S$GLB,, | Performed by: PHYSICIAN ASSISTANT

## 2023-04-03 RX ORDER — OMEPRAZOLE 40 MG/1
40 CAPSULE, DELAYED RELEASE ORAL EVERY MORNING
Qty: 90 CAPSULE | Refills: 0 | Status: SHIPPED | OUTPATIENT
Start: 2023-04-03 | End: 2024-02-07 | Stop reason: SDUPTHER

## 2023-04-03 NOTE — PROGRESS NOTES
Subjective     Patient ID: Chester Red Jr. is a 28 y.o. male.    Chief Complaint: Abdominal Pain (With ha and pain in L shoulder x 1 week)    HPI  L occipital HA x 1 wk.  L shoulder pain   Review of Systems   Constitutional:  Positive for fatigue. Negative for activity change, appetite change, chills, diaphoresis, fever and unexpected weight change.   HENT: Negative.     Eyes: Negative.    Respiratory: Negative.  Negative for cough and shortness of breath.    Cardiovascular: Negative.  Negative for chest pain and leg swelling.   Gastrointestinal: Negative.  Positive for reflux.   Endocrine: Negative.    Genitourinary: Negative.    Musculoskeletal:  Positive for myalgias, neck pain and neck stiffness.   Integumentary:  Negative for rash. Negative.   Neurological:  Positive for headaches.        Objective     Physical Exam  Vitals reviewed.   Constitutional:       General: He is not in acute distress.     Appearance: Normal appearance. He is obese. He is not ill-appearing, toxic-appearing or diaphoretic.   HENT:      Head: Normocephalic and atraumatic.      Comments: Sinuses non tender     Right Ear: Tympanic membrane, ear canal and external ear normal. There is no impacted cerumen.      Left Ear: Tympanic membrane, ear canal and external ear normal. There is no impacted cerumen.      Nose: Nose normal.      Mouth/Throat:      Mouth: Mucous membranes are moist.      Pharynx: Oropharynx is clear. No oropharyngeal exudate or posterior oropharyngeal erythema.   Eyes:      General: No scleral icterus.     Conjunctiva/sclera: Conjunctivae normal.   Neck:      Vascular: No carotid bruit.      Comments: Tight and mildly tender paracervical muscles bilat  Cardiovascular:      Rate and Rhythm: Normal rate and regular rhythm.      Pulses: Normal pulses.      Heart sounds: Normal heart sounds. No murmur heard.    No friction rub. No gallop.   Pulmonary:      Effort: Pulmonary effort is normal. No respiratory distress.       Breath sounds: Normal breath sounds. No stridor. No wheezing, rhonchi or rales.   Abdominal:      General: Abdomen is flat. Bowel sounds are normal. There is no distension.      Palpations: Abdomen is soft. There is no mass.      Tenderness: There is abdominal tenderness. There is no guarding or rebound.      Hernia: No hernia is present.      Comments: Mild mid epigastric tenderness    Musculoskeletal:         General: No swelling.      Cervical back: Normal range of motion. Rigidity and tenderness present.      Right lower leg: No edema.      Left lower leg: No edema.   Lymphadenopathy:      Cervical: No cervical adenopathy.   Skin:     General: Skin is warm and dry.      Findings: No rash.   Neurological:      General: No focal deficit present.      Mental Status: He is alert and oriented to person, place, and time.          Assessment and Plan     Problem List Items Addressed This Visit    Demetra Briggs was seen today for abdominal pain.    Diagnoses and all orders for this visit:    Muscle tension headache    Gastroesophageal reflux disease, unspecified whether esophagitis present  -     omeprazole (PRILOSEC) 40 MG capsule; Take 1 capsule (40 mg total) by mouth every morning.    Gastroesophageal reflux disease with esophagitis without hemorrhage    Neck pain    Cervical paraspinal muscle spasm     Discussed otc's  Discussed diet  No late eating  Discussed home PT

## 2023-04-11 ENCOUNTER — PATIENT MESSAGE (OUTPATIENT)
Dept: RESEARCH | Facility: HOSPITAL | Age: 28
End: 2023-04-11
Payer: COMMERCIAL

## 2023-11-16 ENCOUNTER — E-VISIT (OUTPATIENT)
Dept: FAMILY MEDICINE | Facility: CLINIC | Age: 28
End: 2023-11-16
Payer: COMMERCIAL

## 2023-11-16 DIAGNOSIS — R19.7 NAUSEA VOMITING AND DIARRHEA: Primary | ICD-10-CM

## 2023-11-16 DIAGNOSIS — R11.2 NAUSEA VOMITING AND DIARRHEA: Primary | ICD-10-CM

## 2023-11-16 PROCEDURE — 99499 NO LOS: ICD-10-PCS | Mod: 95,,, | Performed by: NURSE PRACTITIONER

## 2023-11-16 PROCEDURE — 99499 UNLISTED E&M SERVICE: CPT | Mod: 95,,, | Performed by: NURSE PRACTITIONER

## 2023-11-17 ENCOUNTER — HOSPITAL ENCOUNTER (EMERGENCY)
Facility: OTHER | Age: 28
Discharge: HOME | End: 2023-11-17
Payer: COMMERCIAL

## 2023-11-17 VITALS
BODY MASS INDEX: 34.41 KG/M2 | TEMPERATURE: 98 F | HEART RATE: 57 BPM | RESPIRATION RATE: 19 BRPM | HEIGHT: 71 IN | DIASTOLIC BLOOD PRESSURE: 80 MMHG | OXYGEN SATURATION: 98 % | SYSTOLIC BLOOD PRESSURE: 142 MMHG

## 2023-11-17 DIAGNOSIS — R11.0 NAUSEA: ICD-10-CM

## 2023-11-17 DIAGNOSIS — R10.9 ABDOMINAL PAIN, UNSPECIFIED ABDOMINAL LOCATION: Primary | ICD-10-CM

## 2023-11-17 DIAGNOSIS — R14.2 BELCHING: ICD-10-CM

## 2023-11-17 DIAGNOSIS — R07.9 CHEST PAIN: ICD-10-CM

## 2023-11-17 LAB
ALBUMIN SERPL BCP-MCNC: 4.4 G/DL (ref 3.5–5.2)
ALP SERPL-CCNC: 61 U/L (ref 55–135)
ALT SERPL W/O P-5'-P-CCNC: 37 U/L (ref 10–44)
ANION GAP SERPL CALC-SCNC: 11 MMOL/L (ref 8–16)
AST SERPL-CCNC: 25 U/L (ref 10–40)
BASOPHILS # BLD AUTO: 0.04 K/UL (ref 0–0.2)
BASOPHILS NFR BLD: 0.8 % (ref 0–1.9)
BILIRUB SERPL-MCNC: 0.7 MG/DL (ref 0.1–1)
BILIRUB UR QL STRIP: NEGATIVE
BUN SERPL-MCNC: 15 MG/DL (ref 6–20)
CALCIUM SERPL-MCNC: 10 MG/DL (ref 8.7–10.5)
CHLORIDE SERPL-SCNC: 105 MMOL/L (ref 95–110)
CLARITY UR: CLEAR
CO2 SERPL-SCNC: 22 MMOL/L (ref 23–29)
COLOR UR: YELLOW
CREAT SERPL-MCNC: 1.1 MG/DL (ref 0.5–1.4)
DIFFERENTIAL METHOD: ABNORMAL
EOSINOPHIL # BLD AUTO: 0.2 K/UL (ref 0–0.5)
EOSINOPHIL NFR BLD: 3.7 % (ref 0–8)
ERYTHROCYTE [DISTWIDTH] IN BLOOD BY AUTOMATED COUNT: 13.5 % (ref 11.5–14.5)
EST. GFR  (NO RACE VARIABLE): >60 ML/MIN/1.73 M^2
GLUCOSE SERPL-MCNC: 109 MG/DL (ref 70–110)
GLUCOSE UR QL STRIP: NEGATIVE
HCT VFR BLD AUTO: 45 % (ref 40–54)
HCV AB SERPL QL IA: NEGATIVE
HGB BLD-MCNC: 14.8 G/DL (ref 14–18)
HGB UR QL STRIP: NEGATIVE
HIV 1+2 AB+HIV1 P24 AG SERPL QL IA: NEGATIVE
IMM GRANULOCYTES # BLD AUTO: 0.02 K/UL (ref 0–0.04)
IMM GRANULOCYTES NFR BLD AUTO: 0.4 % (ref 0–0.5)
KETONES UR QL STRIP: NEGATIVE
LEUKOCYTE ESTERASE UR QL STRIP: NEGATIVE
LIPASE SERPL-CCNC: 43 U/L (ref 4–60)
LYMPHOCYTES # BLD AUTO: 2.2 K/UL (ref 1–4.8)
LYMPHOCYTES NFR BLD: 44.6 % (ref 18–48)
MCH RBC QN AUTO: 26.8 PG (ref 27–31)
MCHC RBC AUTO-ENTMCNC: 32.9 G/DL (ref 32–36)
MCV RBC AUTO: 82 FL (ref 82–98)
MONOCYTES # BLD AUTO: 0.4 K/UL (ref 0.3–1)
MONOCYTES NFR BLD: 8.1 % (ref 4–15)
NEUTROPHILS # BLD AUTO: 2.1 K/UL (ref 1.8–7.7)
NEUTROPHILS NFR BLD: 42.4 % (ref 38–73)
NITRITE UR QL STRIP: NEGATIVE
NRBC BLD-RTO: 0 /100 WBC
PH UR STRIP: 6 [PH] (ref 5–8)
PLATELET # BLD AUTO: 301 K/UL (ref 150–450)
PMV BLD AUTO: 10.2 FL (ref 9.2–12.9)
POTASSIUM SERPL-SCNC: 4 MMOL/L (ref 3.5–5.1)
PROT SERPL-MCNC: 8.1 G/DL (ref 6–8.4)
PROT UR QL STRIP: ABNORMAL
RBC # BLD AUTO: 5.52 M/UL (ref 4.6–6.2)
SODIUM SERPL-SCNC: 138 MMOL/L (ref 136–145)
SP GR UR STRIP: >1.03 (ref 1–1.03)
URN SPEC COLLECT METH UR: ABNORMAL
UROBILINOGEN UR STRIP-ACNC: NEGATIVE EU/DL
WBC # BLD AUTO: 4.93 K/UL (ref 3.9–12.7)

## 2023-11-17 PROCEDURE — 99284 EMERGENCY DEPT VISIT MOD MDM: CPT

## 2023-11-17 PROCEDURE — 25000003 PHARM REV CODE 250

## 2023-11-17 PROCEDURE — 87389 HIV-1 AG W/HIV-1&-2 AB AG IA: CPT | Performed by: EMERGENCY MEDICINE

## 2023-11-17 PROCEDURE — 85025 COMPLETE CBC W/AUTO DIFF WBC: CPT

## 2023-11-17 PROCEDURE — 93005 ELECTROCARDIOGRAM TRACING: CPT

## 2023-11-17 PROCEDURE — 93010 ELECTROCARDIOGRAM REPORT: CPT | Mod: ,,, | Performed by: INTERNAL MEDICINE

## 2023-11-17 PROCEDURE — 93010 EKG 12-LEAD: ICD-10-PCS | Mod: ,,, | Performed by: INTERNAL MEDICINE

## 2023-11-17 PROCEDURE — 81003 URINALYSIS AUTO W/O SCOPE: CPT

## 2023-11-17 PROCEDURE — 83690 ASSAY OF LIPASE: CPT

## 2023-11-17 PROCEDURE — 80053 COMPREHEN METABOLIC PANEL: CPT

## 2023-11-17 PROCEDURE — 86803 HEPATITIS C AB TEST: CPT | Performed by: EMERGENCY MEDICINE

## 2023-11-17 RX ORDER — LIDOCAINE HYDROCHLORIDE 20 MG/ML
15 SOLUTION OROPHARYNGEAL ONCE
Status: COMPLETED | OUTPATIENT
Start: 2023-11-17 | End: 2023-11-17

## 2023-11-17 RX ORDER — DICYCLOMINE HYDROCHLORIDE 20 MG/1
20 TABLET ORAL 2 TIMES DAILY
Qty: 10 TABLET | Refills: 0 | Status: SHIPPED | OUTPATIENT
Start: 2023-11-17 | End: 2023-11-22

## 2023-11-17 RX ORDER — MAG HYDROX/ALUMINUM HYD/SIMETH 200-200-20
30 SUSPENSION, ORAL (FINAL DOSE FORM) ORAL ONCE
Status: COMPLETED | OUTPATIENT
Start: 2023-11-17 | End: 2023-11-17

## 2023-11-17 RX ORDER — ONDANSETRON 4 MG/1
4 TABLET, ORALLY DISINTEGRATING ORAL EVERY 6 HOURS PRN
Qty: 20 TABLET | Refills: 0 | Status: SHIPPED | OUTPATIENT
Start: 2023-11-17 | End: 2023-11-22

## 2023-11-17 RX ADMIN — ALUMINUM HYDROXIDE, MAGNESIUM HYDROXIDE, AND SIMETHICONE 30 ML: 1200; 120; 1200 SUSPENSION ORAL at 06:11

## 2023-11-17 RX ADMIN — LIDOCAINE HYDROCHLORIDE 15 ML: 20 SOLUTION ORAL at 06:11

## 2023-11-17 NOTE — ED TRIAGE NOTES
Pt with hx of GERD and gall stones arrived with c/o diffuse spasming abd pain, belching, heart burn, and lower back pain x 1 week.  +n/-v/-d.  Denies any constipation.  Pt states abd pain is worse after eating.  Pt denies any urinary symptoms.  Pt denies any dark stool or blood in stool.  Pt answering questions appropriately, speaking in complete sentences, respirations even and unlabored.  Aao x 4.

## 2023-11-18 NOTE — ED PROVIDER NOTES
"Encounter Date: 11/17/2023       History     Chief Complaint   Patient presents with    Abdominal Pain     Pt reporting RLQ pain "for a little minute." Pt describes pain as possible muscle spasms. Pt also reporting nausea. Denies vomiting and diarrhea.     Chest Pain     Pt reporting intermittent L sided chest pain that radiates to L arm "for a while when I eat something." Describes pain as "burning sensation." Hx of GERD. Denies cardiac hx. Denies SOB.      Chester Red Jr. is a 28 y.o. male with history of GERD and gallstones presenting to the emergency department for evaluation of intermittent, epigastric and left-sided pain described as "burning and sharp" that has been present for a week. He reports associated belching. Patient states that he has been experiencing belching similar to this episode for about a year. He was evaluated by GI who advised him to start taking OTC Gaviscon. Patient states that he has been taking about 3-4 tablets each time, which does provide temporary pain relief. He also reports intermittent, squeezing pain in his right lower abdomen that began two days ago. He did experience one episode of nausea a few days ago, which has resolved. He does report odd appearing stool. He denies fever, chills, vomiting, diarrhea, constipation, blood in stool, urinary symptoms, rashes or penile discharge. No history of abdominal surgeries. He denies recent use of antibiotics. He does not consume alcohol. He denies use of illicit substances.       The history is provided by the patient.     Review of patient's allergies indicates:  No Known Allergies  Past Medical History:   Diagnosis Date    Acid reflux     Gall stones     Hypertension      Past Surgical History:   Procedure Laterality Date    ACHILLES TENDON SURGERY      ESOPHAGOGASTRODUODENOSCOPY N/A 8/1/2022    Procedure: EGD (ESOPHAGOGASTRODUODENOSCOPY);  Surgeon: Bubba Lu MD;  Location: [facility];  Service: Endoscopy;  Laterality: N/A; "     Family History   Problem Relation Age of Onset    Prostate cancer Paternal Grandfather     Colon cancer Neg Hx     Colon polyps Neg Hx     Esophageal cancer Neg Hx     Stomach cancer Neg Hx      Social History     Tobacco Use    Smoking status: Every Day     Types: Vaping with nicotine    Smokeless tobacco: Never    Tobacco comments:     Cigar   Substance Use Topics    Alcohol use: Not Currently     Comment: stopped a few months ago    Drug use: No     Review of Systems   Constitutional:  Negative for chills and fever.   HENT:  Negative for congestion, rhinorrhea and sore throat.    Respiratory:  Negative for cough and shortness of breath.    Cardiovascular:  Negative for chest pain.   Gastrointestinal:  Positive for abdominal pain and nausea. Negative for blood in stool, constipation, diarrhea and vomiting.        Positive for belching.    Genitourinary:  Negative for dysuria, frequency and urgency.   Musculoskeletal:  Negative for back pain.   Skin:  Negative for rash.   Neurological:  Negative for dizziness and headaches.   Psychiatric/Behavioral:  Negative for confusion.        Physical Exam     Initial Vitals [11/17/23 1706]   BP Pulse Resp Temp SpO2   (!) 154/93 67 18 98.3 °F (36.8 °C) 98 %      MAP       --         Physical Exam    Vitals reviewed.  Constitutional: He appears well-developed and well-nourished. No distress.   HENT:   Head: Normocephalic and atraumatic.   Nose: Nose normal.   Mouth/Throat: Oropharynx is clear and moist.   Eyes: Conjunctivae and EOM are normal.   Neck: Neck supple.   Normal range of motion.  Cardiovascular:  Normal rate, regular rhythm, normal heart sounds and intact distal pulses.           Pulmonary/Chest: Breath sounds normal. No respiratory distress. He has no wheezes. He has no rhonchi. He has no rales. He exhibits no tenderness.   Abdominal: Abdomen is soft. Bowel sounds are normal. He exhibits no distension and no mass. There is no abdominal tenderness. There is no  rebound and no guarding.   Musculoskeletal:         General: No edema. Normal range of motion.      Cervical back: Normal range of motion and neck supple.     Neurological: He is alert and oriented to person, place, and time. He has normal strength.   Skin: Skin is warm and dry.   Psychiatric: He has a normal mood and affect. His behavior is normal. Judgment and thought content normal.         ED Course   Procedures  Labs Reviewed   CBC W/ AUTO DIFFERENTIAL - Abnormal; Notable for the following components:       Result Value    MCH 26.8 (*)     All other components within normal limits   COMPREHENSIVE METABOLIC PANEL - Abnormal; Notable for the following components:    CO2 22 (*)     All other components within normal limits   URINALYSIS, REFLEX TO URINE CULTURE - Abnormal; Notable for the following components:    Specific Gravity, UA >1.030 (*)     Protein, UA Trace (*)     All other components within normal limits    Narrative:     Specimen Source->Urine   HIV 1 / 2 ANTIBODY    Narrative:     Release to patient->Immediate   HEPATITIS C ANTIBODY    Narrative:     Release to patient->Immediate   LIPASE          Imaging Results    None          Medications   aluminum-magnesium hydroxide-simethicone 200-200-20 mg/5 mL suspension 30 mL (30 mLs Oral Given 11/17/23 1813)     And   LIDOcaine HCl 2% oral solution 15 mL (15 mLs Oral Given 11/17/23 1813)     Medical Decision Making  Amount and/or Complexity of Data Reviewed  Labs: ordered.    Risk  OTC drugs.  Prescription drug management.                          Medical Decision Making:   Initial Assessment:   Urgent evaluation 28-year-old male with history of GERD and gallstones presenting for burning, epigastric and left-sided chest pain, frequent belching, an episode of nausea, and right lower abdominal pain.  He does get relief of symptoms after taking few tablets of Gaviscon.  No fever, chills, vomiting, diarrhea, constipation, blood in stool, urinary symptoms, or  penile discharge.  On exam, he has no focal abdominal tenderness to palpation.  Plan for labs.  Will give GI cocktail.  Clinical Tests:   Lab Tests: Ordered and Reviewed  ED Management:  On review of labs, no leukocytosis.  H&H is stable.  On CMP, no electrolyte derangement.  Kidney and liver function are within normal limits.  Lipase is normal.  No nitrites or leukocytes on UA.  I updated the patient on all results.  Explained that his symptoms are likely due to GERD.  We will discharge home with prescriptions for simethicone, Bentyl, and Zofran.  Advised him to increase his fluid intake.  Also recommended bland diet for the next few days.  Follow-up with GI if his symptoms persist or worsen.  Patient verbalized understanding and agreement with this plan of care. He was given specific return precautions. Advised to follow up with PCP as needed. All questions and concerns addressed. He is stable for discharge.     This note was created with [employer] Fluency Direct Dictation. Please excuse any spelling or grammatical errors.          Clinical Impression:  Final diagnoses:  [R07.9] Chest pain  [R10.9] Abdominal pain, unspecified abdominal location (Primary)  [R11.0] Nausea  [R14.2] Belching          ED Disposition Condition    Discharge           ED Prescriptions       Medication Sig Dispense Start Date End Date Auth. Provider    simethicone 42 mg Chew Take 42 mg by mouth 4 (four) times daily as needed (for gas retention after meals and before bedtime). 20 tablet 11/17/2023 11/22/2023 Preston Ibarra PA-C    dicyclomine (BENTYL) 20 mg tablet Take 1 tablet (20 mg total) by mouth 2 (two) times daily. for 5 days 10 tablet 11/17/2023 11/22/2023 Preston Ibarra PA-C    ondansetron (ZOFRAN-ODT) 4 MG TbDL Take 1 tablet (4 mg total) by mouth every 6 (six) hours as needed (for nausea and vomiting). 20 tablet 11/17/2023 11/22/2023 Preston Ibarra PA-C          Follow-up Information    None          Preston Ibarra PA-C  11/18/23 [id number]

## 2023-11-18 NOTE — DISCHARGE INSTRUCTIONS
Please take simethicone as needed for belching and burning pain.  Please take Bentyl for any abdominal cramping. Please Zofran as needed for nausea.

## 2023-12-01 ENCOUNTER — LAB VISIT (OUTPATIENT)
Dept: LAB | Facility: HOSPITAL | Age: 28
End: 2023-12-01
Attending: STUDENT IN AN ORGANIZED HEALTH CARE EDUCATION/TRAINING PROGRAM
Payer: COMMERCIAL

## 2023-12-01 ENCOUNTER — OFFICE VISIT (OUTPATIENT)
Dept: FAMILY MEDICINE | Facility: CLINIC | Age: 28
End: 2023-12-01
Payer: COMMERCIAL

## 2023-12-01 VITALS
RESPIRATION RATE: 18 BRPM | OXYGEN SATURATION: 95 % | HEART RATE: 66 BPM | DIASTOLIC BLOOD PRESSURE: 74 MMHG | WEIGHT: 254.44 LBS | BODY MASS INDEX: 35.62 KG/M2 | HEIGHT: 71 IN | SYSTOLIC BLOOD PRESSURE: 130 MMHG

## 2023-12-01 DIAGNOSIS — K29.70 GASTRITIS, PRESENCE OF BLEEDING UNSPECIFIED, UNSPECIFIED CHRONICITY, UNSPECIFIED GASTRITIS TYPE: Primary | ICD-10-CM

## 2023-12-01 DIAGNOSIS — K21.9 GASTROESOPHAGEAL REFLUX DISEASE, UNSPECIFIED WHETHER ESOPHAGITIS PRESENT: ICD-10-CM

## 2023-12-01 DIAGNOSIS — K29.70 GASTRITIS, PRESENCE OF BLEEDING UNSPECIFIED, UNSPECIFIED CHRONICITY, UNSPECIFIED GASTRITIS TYPE: ICD-10-CM

## 2023-12-01 PROCEDURE — 99999 PR PBB SHADOW E&M-EST. PATIENT-LVL IV: ICD-10-PCS | Mod: PBBFAC,,, | Performed by: STUDENT IN AN ORGANIZED HEALTH CARE EDUCATION/TRAINING PROGRAM

## 2023-12-01 PROCEDURE — 87338 HPYLORI STOOL AG IA: CPT | Performed by: STUDENT IN AN ORGANIZED HEALTH CARE EDUCATION/TRAINING PROGRAM

## 2023-12-01 PROCEDURE — 99214 OFFICE O/P EST MOD 30 MIN: CPT | Mod: PBBFAC,PO | Performed by: STUDENT IN AN ORGANIZED HEALTH CARE EDUCATION/TRAINING PROGRAM

## 2023-12-01 PROCEDURE — 99999 PR PBB SHADOW E&M-EST. PATIENT-LVL IV: CPT | Mod: PBBFAC,,, | Performed by: STUDENT IN AN ORGANIZED HEALTH CARE EDUCATION/TRAINING PROGRAM

## 2023-12-01 PROCEDURE — 83993 ASSAY FOR CALPROTECTIN FECAL: CPT | Performed by: STUDENT IN AN ORGANIZED HEALTH CARE EDUCATION/TRAINING PROGRAM

## 2023-12-01 PROCEDURE — 82653 EL-1 FECAL QUANTITATIVE: CPT | Performed by: STUDENT IN AN ORGANIZED HEALTH CARE EDUCATION/TRAINING PROGRAM

## 2023-12-01 RX ORDER — SUCRALFATE 1 G/1
1 TABLET ORAL 4 TIMES DAILY
Qty: 40 TABLET | Refills: 0 | Status: SHIPPED | OUTPATIENT
Start: 2023-12-01 | End: 2023-12-11

## 2023-12-01 NOTE — PATIENT INSTRUCTIONS
Resume omeprazole once daily.   Take carafate (dissolve in water and drink) 4 times daily for 10 days.     Go see the general surgeon for opinion on gallbladder.         Try to drop off a stool sample before taking the omeprazole.

## 2023-12-01 NOTE — PROGRESS NOTES
"[facility] CLINIC NOTE    Patient Name: Chester Red Jr.  YOB: 1995    PRESENTING HISTORY     History of Present Illness:  Mr. Chester Red Jr. is a 28 y.o. male here with abdominal and chest pain.   Recently seen in ED for this.     C/o pulling pain in RLQ  Onset about 1 week ago.   Now resolved    Left sided chest pain.   Radiated down into LUQ, sometimes up into upper chest. Resovles with burping.   Long standing problem >1 year. Worsened recently.     Back pain, worse with inspiration. Burps repeatedly, burping helps pains.   Mid back. Comes/goes. Infrequent.     Had diarrhea for 1 week straight. This has now resolved.     Recently seen in ED for this.   Given bentyl for 5 days which helped.   Now that is stopped, symptoms have resolved.       Last year had EGD- gastritis, esophagitis. He is no longer on PPI.   Gaviscon has been helping also.     No triggers identified.   Has tried cutting back on fried foods, fried foods tend to worsen.   Stopped EtOH a long time ago.         No prior abdominal surgeries.       ROS      OBJECTIVE:   Vital Signs:  Vitals:    12/01/23 1021   BP: 130/74   Pulse: 66   Resp: 18   SpO2: 95%   Weight: 115.4 kg (254 lb 6.6 oz)   Height: 5' 11" (1.803 m)            Physical Exam  Vitals and nursing note reviewed.   Constitutional:       Appearance: He is not diaphoretic.   HENT:      Head: Normocephalic and atraumatic.      Right Ear: External ear normal.      Left Ear: External ear normal.   Eyes:      General: No scleral icterus.     Conjunctiva/sclera: Conjunctivae normal.      Pupils: Pupils are equal, round, and reactive to light.   Neck:      Thyroid: No thyromegaly.   Cardiovascular:      Rate and Rhythm: Normal rate and regular rhythm.      Heart sounds: Normal heart sounds. No murmur heard.  Pulmonary:      Effort: Pulmonary effort is normal. No respiratory distress.      Breath sounds: Normal breath sounds. No wheezing or rales.   Abdominal:    "   General: Abdomen is flat. There is no distension.      Palpations: Abdomen is soft.      Tenderness: There is no abdominal tenderness. There is no right CVA tenderness, left CVA tenderness, guarding or rebound.   Musculoskeletal:         General: No tenderness or deformity. Normal range of motion.      Cervical back: Normal range of motion and neck supple.      Right lower leg: No edema.      Left lower leg: No edema.   Lymphadenopathy:      Cervical: No cervical adenopathy.   Skin:     General: Skin is warm and dry.      Findings: No erythema or rash.   Neurological:      Mental Status: He is alert and oriented to person, place, and time.      Gait: Gait is intact.   Psychiatric:         Mood and Affect: Mood and affect normal.         Cognition and Memory: Memory normal.         Judgment: Judgment normal.         ASSESSMENT & PLAN:     Gastritis, presence of bleeding unspecified, unspecified chronicity, unspecified gastritis type  -     sucralfate (CARAFATE) 1 gram tablet; Take 1 tablet (1 g total) by mouth 4 (four) times daily. for 10 days  Dispense: 40 tablet; Refill: 0  -     H. pylori antigen, stool; Future; Expected date: 12/01/2023  -     Pancreatic elastase, fecal; Future; Expected date: 12/01/2023  -     Calprotectin, Stool; Future; Expected date: 12/01/2023    Gastroesophageal reflux disease, unspecified whether esophagitis present      Symptoms most consistent with gastritis. Treat above.     Agree with getting general surgery evaluation for consideration of cholecystectomy if above dose not resolve symptoms.         Armando Mckeon  Internal Medicine

## 2023-12-05 LAB
CALPROTECTIN STL-MCNT: 131.9 MCG/G
ELASTASE 1, FECAL: 242 MCG/G

## 2023-12-07 ENCOUNTER — PATIENT MESSAGE (OUTPATIENT)
Dept: SURGERY | Facility: CLINIC | Age: 28
End: 2023-12-07

## 2023-12-09 ENCOUNTER — E-VISIT (OUTPATIENT)
Dept: FAMILY MEDICINE | Facility: CLINIC | Age: 28
End: 2023-12-09
Payer: COMMERCIAL

## 2023-12-09 DIAGNOSIS — R19.7 DIARRHEA, UNSPECIFIED TYPE: Primary | ICD-10-CM

## 2023-12-09 PROCEDURE — 99421 OL DIG E/M SVC 5-10 MIN: CPT | Mod: ,,, | Performed by: STUDENT IN AN ORGANIZED HEALTH CARE EDUCATION/TRAINING PROGRAM

## 2023-12-09 PROCEDURE — 99421 PR E&M, ONLINE DIGIT, EST, < 7 DAYS, 5-10 MINS: ICD-10-PCS | Mod: ,,, | Performed by: STUDENT IN AN ORGANIZED HEALTH CARE EDUCATION/TRAINING PROGRAM

## 2023-12-10 LAB — H PYLORI AG STL QL IA: NOT DETECTED

## 2023-12-11 NOTE — PROGRESS NOTES
Patient ID: Chester Red Jr. is a 28 y.o. male.    Chief Complaint: GI Problem (Entered automatically based on patient selection in Patient Portal.)    The patient initiated a request through [facility] on 12/9/2023 for evaluation and management with a chief complaint of GI Problem (Entered automatically based on patient selection in Patient Portal.)     I evaluated the questionnaire submission on 12/11/23.    Ohs Peq Evisit Diarrhea    12/9/2023  8:46 AM CST - Filed by Patient   Do you agree to participate in an E-Visit? Yes   If you have any of the following symptoms, please present to your local ER or call 911:  I acknowledge   What is the main issue that you would like for your doctor to address today? Should the medication sucralfate have my stomach upset and frequent diarrhea   Are you able to take your vital signs? No   Do you have diarrhea? Yes   How many stools have you passed in the last 24 hours? One to four   Is there blood in your stool, or is your stool dark red or black? None of the above   Does your stool contain pus or mucus? No   Have you taken a laxative or a medicine to help you move your bowels lately? No   Are you vomiting? No, I am not vomiting   Do you have belly pain? I have a little pain or no pain   Are you feeling dizzy or like you might pass out? No   When did your symptoms begin? 12/7/2023   Do you have a fever? No, I do not have a fever   Are you having trouble walking or lifting yourself due to weakness from this illness?  No   Do any of the following apply to you? My urine is normal   Did your condition begin after a specific meal that may have caused the illness? Not clearly related to a meal.    Have you taken antibiotics recently? I have not been on any antibiotics   Have you been hospitalized in the past 2 months? No, I have not been hospitalized recently.   Do you work in a  center or healthcare environment? No   Does anyone you know have similar symptoms? No   Have you  had a meal consisting of raw meat or fish in the week prior to your illness? No   Have you recently travelled to a place where you may have caught an illness? No   Have you tried any medication or other treatment for your symptoms? No   Provide any information you feel is important to your history not asked above    Please attach any relevant images or files          Recent Labs Obtained:  No visits with results within 7 Day(s) from this visit.   Latest known visit with results is:   Lab Visit on 12/01/2023   Component Date Value Ref Range Status    H. Pylori Antigen, Stool 12/01/2023 NOT DETECTED   Final    Comment: Reference Range:  NOT DETECTED    Antimicrobials, proton pump inhibitors, and bismuth preparations  inhibit H. pylori and ingestion up to two weeks prior to testing may  cause false negative results. If clinically indicated the test should  be repeated on a new specimen obtained two weeks after discontinuing  treatment.  Test Performed at:  [facility] [address]  [address]     ARPIT Osborn MD, PhD, OC      Elastase 1, Fecal 12/01/2023 242  >200 (Normal) mcg/g Final    Comment: Test Performed by:  [facility]  [address] [place] [address]  : Brando Cohen M.D. Ph.D.; CLIA# [id number]      Calprotectin 12/01/2023 131.9 (H)  <50 mcg/g Final    Comment: 27.1 - <50 mcg/g Normal   50 - 120 mcg/g   Borderline  >120 mcg/g       Abnormal    Borderline results suggest repeat testing in 4 to 6 weeks.    Test performed at [facility],  [facility] [place] [address] [place]  [address]     [phone]  Laura Parada MD, PhD - Medical Director         Encounter Diagnosis   Name Primary?    Diarrhea, unspecified type Yes        Orders Placed This Encounter   Procedures    Ambulatory referral/consult to Gastroenterology     Standing Status:   Future     Standing Expiration Date:    1/11/2025     Referral Priority:   Routine     Referral Type:   Consultation     Referral Reason:   Specialty Services Required     Requested Specialty:   Gastroenterology     Number of Visits Requested:   1            No follow-ups on file.      E-Visit Time Tracking:    Day 1 Time (in minutes): 8     Total Time (in minutes): 8

## 2023-12-12 ENCOUNTER — TELEPHONE (OUTPATIENT)
Dept: FAMILY MEDICINE | Facility: CLINIC | Age: 28
End: 2023-12-12

## 2023-12-12 NOTE — TELEPHONE ENCOUNTER
Gastroenterology referral  Received: Today  Sena Langston Staff  Good Morning,    I just wanted to advise the patient currently has no insurance on file. I called the patient to get the new updated insurance information but received no answer. I'll be sure to keep you updated!      Thank you,    Sena HEARN  Pre-Service Rep        Received message above. Noted.

## 2024-02-07 ENCOUNTER — OFFICE VISIT (OUTPATIENT)
Dept: GASTROENTEROLOGY | Facility: CLINIC | Age: 29
End: 2024-02-07
Payer: COMMERCIAL

## 2024-02-07 VITALS
HEART RATE: 60 BPM | HEIGHT: 71 IN | WEIGHT: 253.75 LBS | DIASTOLIC BLOOD PRESSURE: 82 MMHG | SYSTOLIC BLOOD PRESSURE: 129 MMHG | BODY MASS INDEX: 35.52 KG/M2

## 2024-02-07 DIAGNOSIS — K80.20 GALLSTONES: ICD-10-CM

## 2024-02-07 DIAGNOSIS — K21.9 GASTROESOPHAGEAL REFLUX DISEASE, UNSPECIFIED WHETHER ESOPHAGITIS PRESENT: ICD-10-CM

## 2024-02-07 DIAGNOSIS — R14.2 BELCHING: ICD-10-CM

## 2024-02-07 DIAGNOSIS — R19.5 ELEVATED FECAL CALPROTECTIN: ICD-10-CM

## 2024-02-07 DIAGNOSIS — Z87.898 HISTORY OF DIARRHEA: ICD-10-CM

## 2024-02-07 DIAGNOSIS — R11.0 NAUSEA: ICD-10-CM

## 2024-02-07 DIAGNOSIS — R13.10 DYSPHAGIA, UNSPECIFIED TYPE: ICD-10-CM

## 2024-02-07 DIAGNOSIS — R12 WATERBRASH: ICD-10-CM

## 2024-02-07 DIAGNOSIS — R49.0 HOARSE VOICE QUALITY: ICD-10-CM

## 2024-02-07 DIAGNOSIS — R12 HEARTBURN: ICD-10-CM

## 2024-02-07 DIAGNOSIS — R10.10 PAIN OF UPPER ABDOMEN: Primary | ICD-10-CM

## 2024-02-07 PROCEDURE — 1160F RVW MEDS BY RX/DR IN RCRD: CPT | Mod: CPTII,S$GLB,,

## 2024-02-07 PROCEDURE — 3079F DIAST BP 80-89 MM HG: CPT | Mod: CPTII,S$GLB,,

## 2024-02-07 PROCEDURE — 1159F MED LIST DOCD IN RCRD: CPT | Mod: CPTII,S$GLB,,

## 2024-02-07 PROCEDURE — 99999 PR PBB SHADOW E&M-EST. PATIENT-LVL V: CPT | Mod: PBBFAC,,,

## 2024-02-07 PROCEDURE — 3008F BODY MASS INDEX DOCD: CPT | Mod: CPTII,S$GLB,,

## 2024-02-07 PROCEDURE — 3074F SYST BP LT 130 MM HG: CPT | Mod: CPTII,S$GLB,,

## 2024-02-07 PROCEDURE — 99214 OFFICE O/P EST MOD 30 MIN: CPT | Mod: S$GLB,,,

## 2024-02-07 RX ORDER — OMEPRAZOLE 40 MG/1
40 CAPSULE, DELAYED RELEASE ORAL EVERY MORNING
Qty: 90 CAPSULE | Refills: 0 | Status: SHIPPED | OUTPATIENT
Start: 2024-02-07 | End: 2024-05-31 | Stop reason: SDUPTHER

## 2024-02-07 RX ORDER — DICYCLOMINE HYDROCHLORIDE 10 MG/1
10 CAPSULE ORAL 4 TIMES DAILY PRN
Qty: 120 CAPSULE | Refills: 0 | Status: SHIPPED | OUTPATIENT
Start: 2024-02-07 | End: 2024-03-08

## 2024-02-07 NOTE — PROGRESS NOTES
"Subjective:       Patient ID: Chester Red Jr. is a 28 y.o. male Body mass index is 35.39 kg/m².    Chief Complaint: Gastroesophageal Reflux    This patient is new to me.  Referring Provider: Dr. Armando Mckeon for diarrhea.  Established patient of Dr. Lu and myself.    Patient reports referral was for diarrhea, but diarrhea has resolved and he is currently experiencing reflux/abdominal pain.    "Medical Decision Making:   Initial Assessment: Urgent evaluation 28-year-old male with history of GERD and gallstones presenting for burning, epigastric and left-sided chest pain, frequent belching, an episode of nausea, and right lower abdominal pain.  He does get relief of symptoms after taking few tablets of Gaviscon.  No fever, chills, vomiting, diarrhea, constipation, blood in stool, urinary symptoms, or penile discharge.  On exam, he has no focal abdominal tenderness to palpation.  Plan for labs.  Will give GI cocktail.  Clinical Tests: Lab Tests: Ordered and Reviewed  ED Management: On review of labs, no leukocytosis.  H&H is stable.  On CMP, no electrolyte derangement.  Kidney and liver function are within normal limits.  Lipase is normal.  No nitrites or leukocytes on UA.  I updated the patient on all results.  Explained that his symptoms are likely due to GERD.  We will discharge home with prescriptions for simethicone, Bentyl, and Zofran.  Advised him to increase his fluid intake.  Also recommended bland diet for the next few days.  Follow-up with GI if his symptoms persist or worsen.  Patient verbalized understanding and agreement with this plan of care. He was given specific return precautions. Advised to follow up with PCP as needed. All questions and concerns addressed. He is stable for discharge.      This note was created with MModal Fluency Direct Dictation. Please excuse any spelling or grammatical errors."    Gastroesophageal Reflux  He complains of abdominal pain (CHIEF COMPLAINT: Denies " currently; intermittent LUQ & RUQ pain that radiates to his arms, chest & back; pinch that lasts seconds then spontaneously resolves; history of gallstone; no specific trigger for abdominal pain; it occurs spontaneously), belching, dysphagia (occurs after swallowing liquids when he feels his esophagus is swollen), early satiety, heartburn, a hoarse voice, nausea (occurs intermittently sometimes prior to meals, but but sometimes after; spontaneous; started 1-2 weeks ago and varies in frequency) and water brash. He reports no chest pain, no choking, no coughing, no globus sensation or no sore throat. This is a chronic problem. The current episode started more than 1 year ago. The problem occurs frequently. The problem has been gradually worsening (Worsened after stopping omeprazole 1-2 weeks ago). The heartburn duration is several minutes. The heartburn is located in the left chest. The heartburn is of mild intensity. The heartburn does not wake him from sleep. The heartburn does not limit his activity. The heartburn doesn't change with position. The symptoms are aggravated by ETOH (decreased alcohol intake). Associated symptoms include fatigue. Pertinent negatives include no anemia, melena, muscle weakness or weight loss. Patient reports history of diarrhea that resolved; Stool studies completed 12/01/2023 around time diarrhea was present showed elevated fecal calprotectin (131.9), otherwise stool studies were normal.  Currently having 2-3 BMs daily rated stool typically 4 on [place] scale, but occasionally 2-3. Risk factors include obesity and NSAIDs (Patient reports taking Aleve daily for the past 3 days related to tooth ache). He has tried ETOH reduction and a PPI (Past treatments:  Omeprazole 40 mg once daily) for the symptoms. The treatment provided significant (Patient reports significant relief while taking omeprazole, but recently stopped due to forgetfulness) relief. Past procedures include an abdominal  "ultrasound (07/08/22 - "Cholelithiasis") and an EGD (08/01/22 - Normal esophagus. Biopsied. Dilated. Erythematous mucosa in the antrum. Biopsied. Normal duodenal bulb, first portion of the duodenum and second portion of the duodenum. Biopsied; patho: benign, no bacteria). Past procedures do not include esophageal manometry, esophageal pH monitoring, H. pylori antibody titer or a UGI. Past invasive treatments do not include gastroplasty, gastroplication or reflux surgery.     Review of Systems   Constitutional:  Positive for fatigue. Negative for activity change, appetite change, chills, diaphoresis, fever, unexpected weight change and weight loss.   HENT:  Positive for hoarse voice and trouble swallowing. Negative for sore throat.    Respiratory:  Negative for cough, choking and shortness of breath.    Cardiovascular:  Negative for chest pain.   Gastrointestinal:  Positive for abdominal pain (CHIEF COMPLAINT: Denies currently; intermittent LUQ & RUQ pain that radiates to his arms, chest & back; pinch that lasts seconds then spontaneously resolves; history of gallstone; no specific trigger for abdominal pain; it occurs spontaneously), dysphagia (occurs after swallowing liquids when he feels his esophagus is swollen), heartburn and nausea (occurs intermittently sometimes prior to meals, but but sometimes after; spontaneous; started 1-2 weeks ago and varies in frequency). Negative for abdominal distention, anal bleeding, blood in stool, constipation, diarrhea, melena, rectal pain and vomiting.   Musculoskeletal:  Negative for muscle weakness.       No LMP for male patient.  Past Medical History:   Diagnosis Date    Acid reflux     Gall stones     Hypertension      Past Surgical History:   Procedure Laterality Date    ACHILLES TENDON SURGERY      ESOPHAGOGASTRODUODENOSCOPY N/A 08/01/2022    Procedure: EGD (ESOPHAGOGASTRODUODENOSCOPY);  Surgeon: Bubba Lu MD;  Location: [facility];  Service: Endoscopy;  " Laterality: N/A;    UPPER GASTROINTESTINAL ENDOSCOPY       Family History   Problem Relation Age of Onset    Prostate cancer Paternal Grandfather     Colon cancer Neg Hx     Colon polyps Neg Hx     Esophageal cancer Neg Hx     Stomach cancer Neg Hx      Social History     Tobacco Use    Smoking status: Every Day     Types: Vaping with nicotine    Smokeless tobacco: Never    Tobacco comments:     Cigar   Substance Use Topics    Alcohol use: Not Currently     Comment: stopped a few months ago    Drug use: No     Wt Readings from Last 10 Encounters:   02/07/24 115.1 kg (253 lb 12 oz)   12/01/23 115.4 kg (254 lb 6.6 oz)   04/03/23 111.9 kg (246 lb 11.1 oz)   02/09/23 110 kg (242 lb 8.1 oz)   12/08/22 109.7 kg (241 lb 13.5 oz)   11/17/22 122.5 kg (270 lb)   11/15/22 112.8 kg (248 lb 10.9 oz)   11/11/22 104.3 kg (230 lb)   10/21/22 114.1 kg (251 lb 8.7 oz)   10/10/22 107.3 kg (236 lb 8.9 oz)     Lab Results   Component Value Date    WBC 4.93 11/17/2023    HGB 14.8 11/17/2023    HCT 45.0 11/17/2023    MCV 82 11/17/2023     11/17/2023     CMP  Sodium   Date Value Ref Range Status   11/17/2023 138 136 - 145 mmol/L Final     Potassium   Date Value Ref Range Status   11/17/2023 4.0 3.5 - 5.1 mmol/L Final     Comment:     Specimen slightly hemolyzed     Chloride   Date Value Ref Range Status   11/17/2023 105 95 - 110 mmol/L Final     CO2   Date Value Ref Range Status   11/17/2023 22 (L) 23 - 29 mmol/L Final     Glucose   Date Value Ref Range Status   11/17/2023 109 70 - 110 mg/dL Final     BUN   Date Value Ref Range Status   11/17/2023 15 6 - 20 mg/dL Final     Creatinine   Date Value Ref Range Status   11/17/2023 1.1 0.5 - 1.4 mg/dL Final     Calcium   Date Value Ref Range Status   11/17/2023 10.0 8.7 - 10.5 mg/dL Final     Total Protein   Date Value Ref Range Status   11/17/2023 8.1 6.0 - 8.4 g/dL Final     Albumin   Date Value Ref Range Status   11/17/2023 4.4 3.5 - 5.2 g/dL Final     Total Bilirubin   Date Value Ref  Range Status   11/17/2023 0.7 0.1 - 1.0 mg/dL Final     Comment:     For infants and newborns, interpretation of results should be based  on gestational age, weight and in agreement with clinical  observations.    Premature Infant recommended reference ranges:  Up to 24 hours.............<8.0 mg/dL  Up to 48 hours............<12.0 mg/dL  3-5 days..................<15.0 mg/dL  6-29 days.................<15.0 mg/dL       Alkaline Phosphatase   Date Value Ref Range Status   11/17/2023 61 55 - 135 U/L Final     AST   Date Value Ref Range Status   11/17/2023 25 10 - 40 U/L Final     ALT   Date Value Ref Range Status   11/17/2023 37 10 - 44 U/L Final     Anion Gap   Date Value Ref Range Status   11/17/2023 11 8 - 16 mmol/L Final     eGFR if    Date Value Ref Range Status   03/25/2022 >60.0 >60 mL/min/1.73 m^2 Final     eGFR if non    Date Value Ref Range Status   03/25/2022 >60.0 >60 mL/min/1.73 m^2 Final     Comment:     Calculation used to obtain the estimated glomerular filtration  rate (eGFR) is the CKD-EPI equation.        Lab Results   Component Value Date    LIPASE 43 11/17/2023     Lab Results   Component Value Date    TSH 1.580 11/17/2022     Reviewed prior medical records including radiology report of chest x-ray 07/12/2023, chest x-ray 11/17/2022, abdominal ultrasound 07/08/2022 & endoscopy history (see surgical history).    Objective:      Physical Exam  Vitals and nursing note reviewed.   Constitutional:       General: He is not in acute distress.     Appearance: Normal appearance. He is obese. He is not ill-appearing.   HENT:      Mouth/Throat:      Lips: Pink. No lesions.   Cardiovascular:      Rate and Rhythm: Normal rate and regular rhythm.      Pulses: Normal pulses.      Heart sounds: Normal heart sounds.   Pulmonary:      Effort: Pulmonary effort is normal. No respiratory distress.      Breath sounds: Normal breath sounds.   Abdominal:      General: Bowel sounds are  normal. There is no distension or abdominal bruit. There are no signs of injury.      Palpations: Abdomen is soft. There is no shifting dullness, fluid wave, hepatomegaly, splenomegaly or mass.      Tenderness: There is no abdominal tenderness. There is no guarding or rebound. Negative signs include Gloria's sign, Rovsing's sign and McBurney's sign.   Skin:     General: Skin is warm and dry.      Coloration: Skin is not jaundiced or pale.   Neurological:      Mental Status: He is alert and oriented to person, place, and time.   Psychiatric:         Attention and Perception: Attention normal.         Mood and Affect: Mood normal.         Speech: Speech normal.         Behavior: Behavior normal.         Assessment:       1. Pain of upper abdomen    2. Gastroesophageal reflux disease, unspecified whether esophagitis present    3. Heartburn    4. Belching    5. Waterbrash    6. Hoarse voice quality    7. Dysphagia, unspecified type    8. Nausea    9. Gallstones    10. History of diarrhea    11. Elevated fecal calprotectin        Plan:       Pain of upper abdomen  -     omeprazole (PRILOSEC) 40 MG capsule; Take 1 capsule (40 mg total) by mouth every morning.  Dispense: 90 capsule; Refill: 0  -     Ambulatory referral/consult to General Surgery; Future; Expected date: 02/14/2024  -     dicyclomine (BENTYL) 10 MG capsule; Take 1 capsule (10 mg total) by mouth 4 (four) times daily as needed (abdominal pain).  Dispense: 120 capsule; Refill: 0    Gastroesophageal reflux disease, unspecified whether esophagitis present, Heartburn, Belching, & Waterbrash  -Avoid large meals, avoid eating within 2-3 hours of bedtime (avoid late night eating & lying down soon after eating), elevate head of bed if nocturnal symptoms are present, smoking cessation (if current smoker), & weight loss (if overweight).   -Avoid known foods which trigger reflux symptoms & to minimize/avoid high-fat foods, chocolate, caffeine, citrus, alcohol, & tomato  products.  -Avoid/limit use of NSAID's, since they can cause GI upset, bleeding, and/or ulcers. If needed, take with food.  -     FL Esophagram Complete; Future; Expected date: 02/07/2024  -START: omeprazole (PRILOSEC) 40 MG capsule; Take 1 capsule (40 mg total) by mouth every morning.  Dispense: 90 capsule; Refill: 0    Hoarse voice quality  -Avoid large meals, avoid eating within 2-3 hours of bedtime (avoid late night eating & lying down soon after eating), elevate head of bed if nocturnal symptoms are present, smoking cessation (if current smoker), & weight loss (if overweight).   -Avoid known foods which trigger reflux symptoms & to minimize/avoid high-fat foods, chocolate, caffeine, citrus, alcohol, & tomato products.  -Avoid/limit use of NSAID's, since they can cause GI upset, bleeding, and/or ulcers. If needed, take with food.  -     FL Esophagram Complete; Future; Expected date: 02/07/2024  -START: omeprazole (PRILOSEC) 40 MG capsule; Take 1 capsule (40 mg total) by mouth every morning.  Dispense: 90 capsule; Refill: 0  - consider ENT referral    Dysphagia, unspecified type  - possible UGI/esophageal manometry if symptoms persist  -     FL Esophagram Complete; Future; Expected date: 02/07/2024    Nausea & Gallstones  - possible HIDA scan  - recommend low fat diet  - these can cause symptoms in some patients, while other patients with it can be asymptomatic  -     Ambulatory referral/consult to General Surgery; Future; Expected date: 02/14/2024    History of diarrhea  - schedule Colonoscopy, discussed procedure with the patient, including risks and benefits, patient verbalized understanding  - avoid lactose, alcohol, & caffeine  - avoid known triggers  -START: dicyclomine (BENTYL) 10 MG capsule; Take 1 capsule (10 mg total) by mouth 4 (four) times daily as needed (abdominal pain).  Dispense: 120 capsule; Refill: 0    Elevated fecal calprotectin  - schedule Colonoscopy, discussed procedure with the patient,  including risks and benefits, patient verbalized understanding    Follow up in about 4 weeks (around 3/6/2024), or if symptoms worsen or fail to improve.      If no improvement in symptoms or symptoms worsen, call/follow-up at clinic or go to ER.        Total time spent on the encounter includes face to face time and non-face to face time preparing to see the patient (eg, review of tests), Obtaining and/or reviewing separately obtained history, Documenting clinical information in the electronic or other health record, Independently interpreting results (not separately reported) and communicating results to the patient/family/caregiver, or Care coordination (not separately reported).     A dictation software program was used for this note. Please expect some simple typographical  errors in this note.

## 2024-02-12 ENCOUNTER — TELEPHONE (OUTPATIENT)
Dept: GASTROENTEROLOGY | Facility: CLINIC | Age: 29
End: 2024-02-12
Payer: COMMERCIAL

## 2024-02-12 NOTE — TELEPHONE ENCOUNTER
----- Message from Olinda Morley sent at 2/12/2024  7:35 AM CST -----  Contact: patient  Type:  Sooner Apoointment Request    Caller is requesting a sooner appointment.  Caller declined first available appointment listed below.  Caller will not accept being placed on the waitlist and is requesting a message be sent to doctor.    Name of Caller:patient     When is the first available appointment?    Symptoms:colonoscopy     Would the patient rather a call back or a response via [facility]? Call     Best Call Back Number:[phone] (home)      Additional Information:

## 2024-02-12 NOTE — TELEPHONE ENCOUNTER
Returned call to patient to assist. Patient would like to know if there is a sooner date to have procedure. No cancellation available at this time.

## 2024-02-14 ENCOUNTER — OFFICE VISIT (OUTPATIENT)
Dept: SURGERY | Facility: CLINIC | Age: 29
End: 2024-02-14
Payer: COMMERCIAL

## 2024-02-14 VITALS
HEART RATE: 72 BPM | TEMPERATURE: 97 F | BODY MASS INDEX: 35.56 KG/M2 | SYSTOLIC BLOOD PRESSURE: 126 MMHG | HEIGHT: 71 IN | DIASTOLIC BLOOD PRESSURE: 81 MMHG | WEIGHT: 254 LBS

## 2024-02-14 DIAGNOSIS — K21.00 REGURGITANT ESOPHAGITIS: ICD-10-CM

## 2024-02-14 DIAGNOSIS — K80.20 GALLSTONES: ICD-10-CM

## 2024-02-14 DIAGNOSIS — R14.0 BLOATING: Primary | ICD-10-CM

## 2024-02-14 DIAGNOSIS — R10.10 PAIN OF UPPER ABDOMEN: ICD-10-CM

## 2024-02-14 PROCEDURE — 3074F SYST BP LT 130 MM HG: CPT | Mod: CPTII,S$GLB,, | Performed by: SURGERY

## 2024-02-14 PROCEDURE — 99214 OFFICE O/P EST MOD 30 MIN: CPT | Mod: S$GLB,,, | Performed by: SURGERY

## 2024-02-14 PROCEDURE — 1159F MED LIST DOCD IN RCRD: CPT | Mod: CPTII,S$GLB,, | Performed by: SURGERY

## 2024-02-14 PROCEDURE — 99999 PR PBB SHADOW E&M-EST. PATIENT-LVL III: CPT | Mod: PBBFAC,,, | Performed by: SURGERY

## 2024-02-14 PROCEDURE — 3008F BODY MASS INDEX DOCD: CPT | Mod: CPTII,S$GLB,, | Performed by: SURGERY

## 2024-02-14 PROCEDURE — 3079F DIAST BP 80-89 MM HG: CPT | Mod: CPTII,S$GLB,, | Performed by: SURGERY

## 2024-02-14 NOTE — H&P
GENERAL SURGERY  OUTPATIENT H&P    REASON FOR VISIT/CC: Gallstones    HPI: Chester Red Jr. is a 28 y.o. male evaluation of gallstones as possible etiology of abdominal symptoms.  Patient has had a multiyear history of reflux, regurgitation, bloating and nausea. This has progressively increased over the past few months. He previously underwent EGD in 2022 which showed esophagitis and a stricture which was biopsied. Negative for eosinophilic esophagitis. Negative for H pylori.  Has been on Gaviscon in the past which previously provided symptomatic relief but this no longer works.  Has been started on PPI with also minimal relief. Symptoms seem to be of right and left-sided upper abdominal pain a can radiate to the back, frequent belching and regurgitation, bloating and sensation of fullness after eating a few bites of food. Denies yellowing of the skin or eyes, fevers or chills.  He was having diarrhea for the past 3 days. Has previous ultrasound in the past showing gallstones.    I have reviewed the patient's chart including prior progress notes, procedures and testing.     ROS:   Review of Systems    PROBLEM LIST:  Patient Active Problem List   Diagnosis    Tobacco use    Gastroesophageal reflux disease with esophagitis without hemorrhage         HISTORY  Past Medical History:   Diagnosis Date    Acid reflux     Gall stones     Hypertension        Past Surgical History:   Procedure Laterality Date    ACHILLES TENDON SURGERY      ESOPHAGOGASTRODUODENOSCOPY N/A 08/01/2022    Procedure: EGD (ESOPHAGOGASTRODUODENOSCOPY);  Surgeon: Bubba Lu MD;  Location: [facility];  Service: Endoscopy;  Laterality: N/A;    UPPER GASTROINTESTINAL ENDOSCOPY         Social History     Tobacco Use    Smoking status: Every Day     Types: Vaping with nicotine    Smokeless tobacco: Never    Tobacco comments:     Cigar   Substance Use Topics    Alcohol use: Not Currently     Comment: stopped a few months ago    Drug use: No        Family History   Problem Relation Age of Onset    Prostate cancer Paternal Grandfather     Colon cancer Neg Hx     Colon polyps Neg Hx     Esophageal cancer Neg Hx     Stomach cancer Neg Hx          MEDS:  Current Outpatient Medications on File Prior to Visit   Medication Sig Dispense Refill    dicyclomine (BENTYL) 10 MG capsule Take 1 capsule (10 mg total) by mouth 4 (four) times daily as needed (abdominal pain). 120 capsule 0    omeprazole (PRILOSEC) 40 MG capsule Take 1 capsule (40 mg total) by mouth every morning. 90 capsule 0     No current facility-administered medications on file prior to visit.       ALLERGIES:  Review of patient's allergies indicates:  No Known Allergies      VITALS:  There were no vitals filed for this visit.      PHYSICAL EXAM:  Physical Exam  Vitals reviewed.   Constitutional:       General: He is not in acute distress.     Appearance: Normal appearance. He is well-developed.   HENT:      Head: Normocephalic and atraumatic.   Eyes:      General: No scleral icterus.  Neck:      Trachea: No tracheal deviation.   Cardiovascular:      Rate and Rhythm: Normal rate and regular rhythm.      Pulses: Normal pulses.   Pulmonary:      Effort: Pulmonary effort is normal. No respiratory distress.      Breath sounds: Normal breath sounds.   Abdominal:      General: There is no distension.      Palpations: Abdomen is soft.      Tenderness: There is no abdominal tenderness.   Musculoskeletal:         General: No swelling or tenderness. Normal range of motion.      Cervical back: Normal range of motion and neck supple. No rigidity.   Skin:     General: Skin is warm and dry.      Coloration: Skin is not jaundiced.      Findings: No erythema.   Neurological:      General: No focal deficit present.      Mental Status: He is alert and oriented to person, place, and time. He is not disoriented.      Motor: No weakness or abnormal muscle tone.   Psychiatric:         Mood and Affect: Mood normal.          Behavior: Behavior normal.         Thought Content: Thought content normal.         Judgment: Judgment normal.           LABS:  Lab Results   Component Value Date    WBC 4.93 11/17/2023    RBC 5.52 11/17/2023    HGB 14.8 11/17/2023    HCT 45.0 11/17/2023     11/17/2023     Lab Results   Component Value Date     11/17/2023     11/17/2023    K 4.0 11/17/2023     11/17/2023    CO2 22 (L) 11/17/2023    BUN 15 11/17/2023    CREATININE 1.1 11/17/2023    CALCIUM 10.0 11/17/2023     Lab Results   Component Value Date    ALT 37 11/17/2023    AST 25 11/17/2023    ALKPHOS 61 11/17/2023    BILITOT 0.7 11/17/2023     Lab Results   Component Value Date    MG 1.8 11/17/2022       STUDIES:  Images and reports were personally reviewed.  Abdominal ultrasound 07/08/2022  FINDINGS:  Liver: Normal in size, measuring 16.4 cm. Homogeneous echotexture. No focal hepatic lesions.     Gallbladder: A single gallstone is identified.  Thickening or edema of the gallbladder wall is not seen.     Biliary system: The common duct is not dilated, measuring 2.4 mm.  No intrahepatic ductal dilatation.     Spleen: Normal in size and echotexture, measuring 11.1 cm.     Miscellaneous: No upper abdominal ascites.     Impression:     Cholelithiasis      ASSESSMENT & PLAN:  28 y.o. male with known history of gallstones, abdominal pain, postprandial bloating and fullness, reflux and regurgitation  -signs and symptoms not entirely consistent with biliary etiology   -at this time majority of symptoms seem to be related to either refractory reflux or possibly delayed gastric emptying   -has previous EGD which failed to show hiatal hernia but did show esophagitis, esophagram has been ordered by GI which I think is important the patient gets   -I have also ordered a gastric emptying study as a lot of his symptoms seem to be related to bloating and postprandial fullness associated with nausea and belching, he does not have risk factors  however have to consider idiopathic etiologies  -no indication for cholecystectomy at this time  -will follow up after studies have been completed  -continue Gaviscon and omeprazole as prescribed

## 2024-02-19 ENCOUNTER — HOSPITAL ENCOUNTER (OUTPATIENT)
Dept: RADIOLOGY | Facility: HOSPITAL | Age: 29
Discharge: HOME | End: 2024-02-19
Payer: COMMERCIAL

## 2024-02-19 DIAGNOSIS — R13.10 DYSPHAGIA, UNSPECIFIED TYPE: ICD-10-CM

## 2024-02-19 DIAGNOSIS — K21.9 GASTROESOPHAGEAL REFLUX DISEASE, UNSPECIFIED WHETHER ESOPHAGITIS PRESENT: ICD-10-CM

## 2024-02-19 PROCEDURE — 25500020 PHARM REV CODE 255

## 2024-02-19 PROCEDURE — 74220 X-RAY XM ESOPHAGUS 1CNTRST: CPT | Mod: 26,,, | Performed by: RADIOLOGY

## 2024-02-19 PROCEDURE — 74220 X-RAY XM ESOPHAGUS 1CNTRST: CPT | Mod: TC

## 2024-02-19 PROCEDURE — A9698 NON-RAD CONTRAST MATERIALNOC: HCPCS

## 2024-02-19 RX ADMIN — BARIUM SULFATE 200 ML: 0.6 SUSPENSION ORAL at 10:02

## 2024-02-28 ENCOUNTER — OFFICE VISIT (OUTPATIENT)
Dept: FAMILY MEDICINE | Facility: CLINIC | Age: 29
End: 2024-02-28
Payer: COMMERCIAL

## 2024-02-28 VITALS
DIASTOLIC BLOOD PRESSURE: 72 MMHG | TEMPERATURE: 98 F | BODY MASS INDEX: 35 KG/M2 | HEIGHT: 71 IN | OXYGEN SATURATION: 98 % | HEART RATE: 62 BPM | RESPIRATION RATE: 18 BRPM | WEIGHT: 250 LBS | SYSTOLIC BLOOD PRESSURE: 116 MMHG

## 2024-02-28 DIAGNOSIS — J02.9 SORE THROAT: ICD-10-CM

## 2024-02-28 DIAGNOSIS — R68.89 FLU-LIKE SYMPTOMS: ICD-10-CM

## 2024-02-28 DIAGNOSIS — K21.9 GASTROESOPHAGEAL REFLUX DISEASE, UNSPECIFIED WHETHER ESOPHAGITIS PRESENT: Primary | ICD-10-CM

## 2024-02-28 LAB
CTP QC/QA: YES
CTP QC/QA: YES
POC MOLECULAR INFLUENZA A AGN: NEGATIVE
POC MOLECULAR INFLUENZA B AGN: NEGATIVE
SARS-COV-2 RDRP RESP QL NAA+PROBE: NEGATIVE

## 2024-02-28 PROCEDURE — 99214 OFFICE O/P EST MOD 30 MIN: CPT | Mod: S$GLB,,,

## 2024-02-28 PROCEDURE — 87635 SARS-COV-2 COVID-19 AMP PRB: CPT | Mod: QW,S$GLB,,

## 2024-02-28 PROCEDURE — 1160F RVW MEDS BY RX/DR IN RCRD: CPT | Mod: CPTII,S$GLB,,

## 2024-02-28 PROCEDURE — 3078F DIAST BP <80 MM HG: CPT | Mod: CPTII,S$GLB,,

## 2024-02-28 PROCEDURE — 3008F BODY MASS INDEX DOCD: CPT | Mod: CPTII,S$GLB,,

## 2024-02-28 PROCEDURE — 87502 INFLUENZA DNA AMP PROBE: CPT | Mod: QW,S$GLB,,

## 2024-02-28 PROCEDURE — 99999 PR PBB SHADOW E&M-EST. PATIENT-LVL IV: CPT | Mod: PBBFAC,,,

## 2024-02-28 PROCEDURE — 3074F SYST BP LT 130 MM HG: CPT | Mod: CPTII,S$GLB,,

## 2024-02-28 PROCEDURE — 1159F MED LIST DOCD IN RCRD: CPT | Mod: CPTII,S$GLB,,

## 2024-02-28 RX ORDER — FAMOTIDINE 20 MG/1
20 TABLET, FILM COATED ORAL 2 TIMES DAILY
Qty: 60 TABLET | Refills: 11 | Status: SHIPPED | OUTPATIENT
Start: 2024-02-28 | End: 2024-06-12

## 2024-02-28 RX ORDER — METHYLPREDNISOLONE 4 MG/1
TABLET ORAL
Qty: 21 EACH | Refills: 0 | Status: SHIPPED | OUTPATIENT
Start: 2024-02-28 | End: 2024-03-06

## 2024-02-28 NOTE — PATIENT INSTRUCTIONS
Soren Briggs,     If you are due for any health screening(s) below please notify me so we can arrange them to be ordered and scheduled to maintain your health. Most healthy patients complete it. Don't lose out on improving your health.     All of your core healthy metrics are met.

## 2024-02-28 NOTE — PROGRESS NOTES
"Subjective:       Patient ID: Chester Red Jr. is a 28 y.o. male.    Chief Complaint: Abdominal Pain (Bilateral upper abdominal pain. Pt denies any nausea, vomiting, diarrhea or constipation.), Gastroesophageal Reflux (Seen Gastro but still having bad acid reflux.), Sore Throat (X 2 days. Pt not sure of fever.), and Generalized Body Aches    Gastroesophageal Reflux  He complains of abdominal pain and a sore throat. This is a chronic problem. The current episode started more than 1 month ago. The problem has been gradually worsening. The symptoms are aggravated by certain foods and lying down. Associated symptoms include fatigue. Treatments tried: omeprazole. The treatment provided mild relief.   Sore Throat   This is a new problem. The pain is moderate. Associated symptoms include abdominal pain. Exposure to: Sick family member.       Past Medical History:   Diagnosis Date    Acid reflux     Gall stones     Hypertension        Review of patient's allergies indicates:  No Known Allergies      Current Outpatient Medications:     omeprazole (PRILOSEC) 40 MG capsule, Take 1 capsule (40 mg total) by mouth every morning., Disp: 90 capsule, Rfl: 0    dicyclomine (BENTYL) 10 MG capsule, Take 1 capsule (10 mg total) by mouth 4 (four) times daily as needed (abdominal pain). (Patient not taking: Reported on 2/28/2024), Disp: 120 capsule, Rfl: 0    famotidine (PEPCID) 20 MG tablet, Take 1 tablet (20 mg total) by mouth 2 (two) times daily., Disp: 60 tablet, Rfl: 11    Review of Systems   Constitutional:  Positive for fatigue.   HENT:  Positive for sore throat.    Gastrointestinal:  Positive for abdominal pain.   Musculoskeletal:  Positive for myalgias.       Objective:      /72 (BP Location: Right arm, Patient Position: Sitting, BP Method: Large (Manual))   Pulse 62   Temp 98.1 °F (36.7 °C) (Oral)   Resp 18   Ht 5' 11" (1.803 m)   Wt 113.4 kg (250 lb)   SpO2 98%   BMI 34.87 kg/m²   Physical Exam  Vitals reviewed. "   Constitutional:       General: He is not in acute distress.     Appearance: Normal appearance. He is not ill-appearing, toxic-appearing or diaphoretic.   HENT:      Head: Normocephalic.      Right Ear: External ear normal.      Left Ear: External ear normal.      Nose: Nose normal. No congestion or rhinorrhea.      Mouth/Throat:      Mouth: Mucous membranes are moist.      Pharynx: Oropharynx is clear. Posterior oropharyngeal erythema present.   Eyes:      General: No scleral icterus.        Right eye: No discharge.         Left eye: No discharge.      Extraocular Movements: Extraocular movements intact.      Conjunctiva/sclera: Conjunctivae normal.   Cardiovascular:      Rate and Rhythm: Normal rate and regular rhythm.      Pulses: Normal pulses.      Heart sounds: Normal heart sounds. No murmur heard.     No friction rub. No gallop.   Pulmonary:      Effort: Pulmonary effort is normal. No respiratory distress.      Breath sounds: Normal breath sounds. No wheezing, rhonchi or rales.   Chest:      Chest wall: No tenderness.   Musculoskeletal:         General: No swelling, tenderness or deformity. Normal range of motion.      Cervical back: Normal range of motion.      Right lower leg: No edema.      Left lower leg: No edema.   Skin:     General: Skin is warm and dry.      Capillary Refill: Capillary refill takes less than 2 seconds.      Coloration: Skin is not jaundiced.      Findings: No bruising, erythema, lesion or rash.   Neurological:      Mental Status: He is alert and oriented to person, place, and time.      Gait: Gait normal.   Psychiatric:         Mood and Affect: Mood normal.         Behavior: Behavior normal.         Thought Content: Thought content normal.         Judgment: Judgment normal.         Assessment:       1. Gastroesophageal reflux disease, unspecified whether esophagitis present    2. Flu-like symptoms        Plan:       Gastroesophageal reflux disease, unspecified whether esophagitis  present  -     famotidine (PEPCID) 20 MG tablet; Take 1 tablet (20 mg total) by mouth 2 (two) times daily.  Dispense: 60 tablet; Refill: 11    Flu-like symptoms  -     POCT Influenza A/B Molecular  -     POCT COVID-19 Rapid Screening                 Darío Rios PA-C  Family Medicine Physician Assistant              I spent a total of 20 minutes on the day of the visit.This includes face to face time and non-face to face time preparing to see the patient (eg, review of tests), obtaining and/or reviewing separately obtained history, documenting clinical information in the electronic or other health record, independently interpreting results and communicating results to the patient/family/caregiver, or care coordinator.      We have addressed [4] Moderate: 1 or more chronic illnesses with exacerbation, progression, or side effects of treatment / 2 or more stable chronic illnesses / 1 undiagnosed new problem with uncertain prognosis / 1 acute illness with systemic symptoms / 1 acute complicated injury  The complexity of the data reviewed and analyzed for this visit was [3] Limited (Reviewed prior external note, ordered unique testing or reviewed the results of each unique test)   The risk of complications and/or morbidity or mortality are [4] Moderate risk (I.e. prescription drug management / decision regarding minor surgery with identified pt or procedure risk factors / decision regarding elective major surgery without identified pt or procedure risk factors / diagnosis or treatment significantly limited by social determinants of health)   The level of Medical Decision Making for this visit is [4] Moderate

## 2024-02-29 ENCOUNTER — TELEPHONE (OUTPATIENT)
Dept: FAMILY MEDICINE | Facility: CLINIC | Age: 29
End: 2024-02-29
Payer: COMMERCIAL

## 2024-02-29 NOTE — TELEPHONE ENCOUNTER
----- Message from Darío Rios PA-C sent at 2/28/2024  1:35 PM CST -----  Negative COVID and flu. I sent Rx to his pharmacy. It is likely a viral pharyngitis. Will send medrol and hold off on the amoxicillin for now. Use chloraseptic over the counter as well. IF symptoms dont improve by Friday let me know.

## 2024-03-06 ENCOUNTER — OFFICE VISIT (OUTPATIENT)
Dept: FAMILY MEDICINE | Facility: CLINIC | Age: 29
End: 2024-03-06
Payer: COMMERCIAL

## 2024-03-06 VITALS
RESPIRATION RATE: 18 BRPM | TEMPERATURE: 99 F | BODY MASS INDEX: 34.41 KG/M2 | OXYGEN SATURATION: 96 % | DIASTOLIC BLOOD PRESSURE: 80 MMHG | SYSTOLIC BLOOD PRESSURE: 122 MMHG | WEIGHT: 245.81 LBS | HEIGHT: 71 IN | HEART RATE: 90 BPM

## 2024-03-06 DIAGNOSIS — R68.89 FLU-LIKE SYMPTOMS: ICD-10-CM

## 2024-03-06 DIAGNOSIS — J02.9 SORE THROAT: Primary | ICD-10-CM

## 2024-03-06 PROCEDURE — 99999 PR PBB SHADOW E&M-EST. PATIENT-LVL IV: CPT | Mod: PBBFAC,,,

## 2024-03-06 PROCEDURE — 87502 INFLUENZA DNA AMP PROBE: CPT | Mod: PBBFAC,PO

## 2024-03-06 PROCEDURE — 87635 SARS-COV-2 COVID-19 AMP PRB: CPT | Mod: PBBFAC,PO

## 2024-03-06 PROCEDURE — 99213 OFFICE O/P EST LOW 20 MIN: CPT | Mod: S$PBB,,,

## 2024-03-06 RX ORDER — AMOXICILLIN 500 MG/1
500 TABLET, FILM COATED ORAL EVERY 12 HOURS
Qty: 20 TABLET | Refills: 0 | Status: SHIPPED | OUTPATIENT
Start: 2024-03-06 | End: 2024-03-16

## 2024-03-06 NOTE — PATIENT INSTRUCTIONS
Soren Briggs,     If you are due for any health screening(s) below please notify me so we can arrange them to be ordered and scheduled to maintain your health. Most healthy patients complete it. Don't lose out on improving your health.     Tests to Keep You Healthy    Tobacco Cessation: NO

## 2024-03-06 NOTE — PROGRESS NOTES
"Subjective:       Patient ID: Chester Red Jr. is a 28 y.o. male.    Chief Complaint: Sore Throat and Chills    Sick visit     Sore Throat   This is a new problem. The current episode started 1 to 4 weeks ago. The problem has been gradually worsening. The pain is moderate. Associated symptoms include trouble swallowing.       Past Medical History:   Diagnosis Date    Acid reflux     Gall stones     Hypertension        Review of patient's allergies indicates:  No Known Allergies      Current Outpatient Medications:     famotidine (PEPCID) 20 MG tablet, Take 1 tablet (20 mg total) by mouth 2 (two) times daily., Disp: 60 tablet, Rfl: 11    omeprazole (PRILOSEC) 40 MG capsule, Take 1 capsule (40 mg total) by mouth every morning., Disp: 90 capsule, Rfl: 0    amoxicillin (AMOXIL) 500 MG Tab, Take 1 tablet (500 mg total) by mouth every 12 (twelve) hours. for 10 days, Disp: 20 tablet, Rfl: 0    dicyclomine (BENTYL) 10 MG capsule, Take 1 capsule (10 mg total) by mouth 4 (four) times daily as needed (abdominal pain). (Patient not taking: Reported on 2/28/2024), Disp: 120 capsule, Rfl: 0    Review of Systems   Constitutional:  Positive for chills.   HENT:  Positive for sore throat and trouble swallowing.    Musculoskeletal:  Positive for myalgias.       Objective:      /80 (BP Location: Right arm, Patient Position: Sitting, BP Method: Large (Manual))   Pulse 90   Temp 99.2 °F (37.3 °C) (Oral)   Resp 18   Ht 5' 11" (1.803 m)   Wt 111.5 kg (245 lb 13 oz)   SpO2 96%   BMI 34.28 kg/m²   Physical Exam  Vitals reviewed.   Constitutional:       General: He is not in acute distress.     Appearance: Normal appearance. He is ill-appearing. He is not toxic-appearing or diaphoretic.   HENT:      Head: Normocephalic.      Right Ear: External ear normal.      Left Ear: External ear normal.      Nose: Nose normal. No congestion or rhinorrhea.      Mouth/Throat:      Mouth: Mucous membranes are moist.      Pharynx: Oropharynx " is clear. Posterior oropharyngeal erythema present.   Eyes:      General: No scleral icterus.        Right eye: No discharge.         Left eye: No discharge.      Extraocular Movements: Extraocular movements intact.      Conjunctiva/sclera: Conjunctivae normal.   Cardiovascular:      Rate and Rhythm: Normal rate and regular rhythm.      Pulses: Normal pulses.      Heart sounds: Normal heart sounds. No murmur heard.     No friction rub. No gallop.   Pulmonary:      Effort: Pulmonary effort is normal. No respiratory distress.      Breath sounds: Normal breath sounds. No wheezing, rhonchi or rales.   Chest:      Chest wall: No tenderness.   Musculoskeletal:         General: No swelling, tenderness or deformity. Normal range of motion.      Cervical back: Normal range of motion.      Right lower leg: No edema.      Left lower leg: No edema.   Skin:     General: Skin is warm and dry.      Capillary Refill: Capillary refill takes less than 2 seconds.      Coloration: Skin is not jaundiced.      Findings: No bruising, erythema, lesion or rash.   Neurological:      Mental Status: He is alert and oriented to person, place, and time.      Gait: Gait normal.   Psychiatric:         Mood and Affect: Mood normal.         Behavior: Behavior normal.         Thought Content: Thought content normal.         Judgment: Judgment normal.         Assessment:       1. Sore throat    2. Flu-like symptoms        Plan:       Sore throat  -     amoxicillin (AMOXIL) 500 MG Tab; Take 1 tablet (500 mg total) by mouth every 12 (twelve) hours. for 10 days  Dispense: 20 tablet; Refill: 0    Flu-like symptoms  -     POCT Influenza A/B Molecular  -     POCT COVID-19 Rapid Screening                 Darío Rios PA-C  Family Medicine Physician Assistant              I spent a total of 20 minutes on the day of the visit.This includes face to face time and non-face to face time preparing to see the patient (eg, review of tests), obtaining and/or  reviewing separately obtained history, documenting clinical information in the electronic or other health record, independently interpreting results and communicating results to the patient/family/caregiver, or care coordinator.      We have addressed [3] Low: 2 or more self-limited or minor problems / 1 stable chronic illness / 1 acute, uncomplicated illness or injury  The complexity of the data reviewed and analyzed for this visit was [3] Limited (Reviewed prior external note, ordered unique testing or reviewed the results of each unique test)   The risk of complications and/or morbidity or mortality are [4] Moderate risk (I.e. prescription drug management / decision regarding minor surgery with identified pt or procedure risk factors / decision regarding elective major surgery without identified pt or procedure risk factors / diagnosis or treatment significantly limited by social determinants of health)   The level of Medical Decision Making for this visit is [3] Low

## 2024-03-22 ENCOUNTER — ANESTHESIA (OUTPATIENT)
Dept: ENDOSCOPY | Facility: HOSPITAL | Age: 29
End: 2024-03-22
Payer: COMMERCIAL

## 2024-03-22 ENCOUNTER — ANESTHESIA EVENT (OUTPATIENT)
Dept: ENDOSCOPY | Facility: HOSPITAL | Age: 29
End: 2024-03-22
Payer: COMMERCIAL

## 2024-03-22 ENCOUNTER — HOSPITAL ENCOUNTER (OUTPATIENT)
Facility: HOSPITAL | Age: 29
Discharge: HOME | End: 2024-03-22
Attending: INTERNAL MEDICINE | Admitting: STUDENT IN AN ORGANIZED HEALTH CARE EDUCATION/TRAINING PROGRAM
Payer: COMMERCIAL

## 2024-03-22 VITALS
SYSTOLIC BLOOD PRESSURE: 110 MMHG | DIASTOLIC BLOOD PRESSURE: 69 MMHG | HEIGHT: 71 IN | TEMPERATURE: 98 F | WEIGHT: 245 LBS | OXYGEN SATURATION: 100 % | RESPIRATION RATE: 18 BRPM | HEART RATE: 73 BPM | BODY MASS INDEX: 34.3 KG/M2

## 2024-03-22 DIAGNOSIS — K63.5 POLYP OF COLON, UNSPECIFIED PART OF COLON, UNSPECIFIED TYPE: Primary | ICD-10-CM

## 2024-03-22 DIAGNOSIS — R19.5 ELEVATED FECAL CALPROTECTIN: ICD-10-CM

## 2024-03-22 DIAGNOSIS — K64.8 INTERNAL HEMORRHOIDS: ICD-10-CM

## 2024-03-22 PROCEDURE — 27201012 HC FORCEPS, HOT/COLD, DISP: Performed by: INTERNAL MEDICINE

## 2024-03-22 PROCEDURE — 45380 COLONOSCOPY AND BIOPSY: CPT | Mod: ,,, | Performed by: INTERNAL MEDICINE

## 2024-03-22 PROCEDURE — 37000008 HC ANESTHESIA 1ST 15 MINUTES: Performed by: INTERNAL MEDICINE

## 2024-03-22 PROCEDURE — 45380 COLONOSCOPY AND BIOPSY: CPT | Performed by: INTERNAL MEDICINE

## 2024-03-22 PROCEDURE — 37000009 HC ANESTHESIA EA ADD 15 MINS: Performed by: INTERNAL MEDICINE

## 2024-03-22 PROCEDURE — 25000003 PHARM REV CODE 250: Performed by: INTERNAL MEDICINE

## 2024-03-22 PROCEDURE — 63600175 PHARM REV CODE 636 W HCPCS: Performed by: STUDENT IN AN ORGANIZED HEALTH CARE EDUCATION/TRAINING PROGRAM

## 2024-03-22 PROCEDURE — 25000003 PHARM REV CODE 250: Performed by: STUDENT IN AN ORGANIZED HEALTH CARE EDUCATION/TRAINING PROGRAM

## 2024-03-22 RX ORDER — PROPOFOL 10 MG/ML
VIAL (ML) INTRAVENOUS
Status: DISCONTINUED | OUTPATIENT
Start: 2024-03-22 | End: 2024-03-22

## 2024-03-22 RX ORDER — SODIUM CHLORIDE 9 MG/ML
INJECTION, SOLUTION INTRAVENOUS CONTINUOUS
Status: DISCONTINUED | OUTPATIENT
Start: 2024-03-22 | End: 2024-03-22 | Stop reason: HOSPADM

## 2024-03-22 RX ORDER — LIDOCAINE HYDROCHLORIDE 20 MG/ML
INJECTION INTRAVENOUS
Status: DISCONTINUED | OUTPATIENT
Start: 2024-03-22 | End: 2024-03-22

## 2024-03-22 RX ADMIN — PROPOFOL 50 MG: 10 INJECTION, EMULSION INTRAVENOUS at 11:03

## 2024-03-22 RX ADMIN — SODIUM CHLORIDE: 9 INJECTION, SOLUTION INTRAVENOUS at 10:03

## 2024-03-22 RX ADMIN — PROPOFOL 100 MG: 10 INJECTION, EMULSION INTRAVENOUS at 11:03

## 2024-03-22 RX ADMIN — LIDOCAINE HYDROCHLORIDE 100 MG: 20 INJECTION, SOLUTION INTRAVENOUS at 11:03

## 2024-03-22 NOTE — TRANSFER OF CARE
"Anesthesia Transfer of Care Note    Patient: Chester Red Jr.    Procedure(s) Performed: Procedure(s) (LRB):  COLONOSCOPY (N/A)    Patient location: GI    Anesthesia Type: general    Transport from OR: Transported from OR on room air with adequate spontaneous ventilation    Post pain: adequate analgesia    Post assessment: no apparent anesthetic complications and tolerated procedure well    Post vital signs: stable    Level of consciousness: awake    Nausea/Vomiting: no nausea/vomiting    Complications: none    Transfer of care protocol was followed      Last vitals: Visit Vitals  BP (!) 131/90 (BP Location: Left arm, Patient Position: Lying)   Pulse 68   Temp 36.3 °C (97.3 °F) (Skin)   Resp 18   Ht 5' 11" (1.803 m)   Wt 111.1 kg (245 lb)   SpO2 96%   BMI 34.17 kg/m²     "

## 2024-03-22 NOTE — PR[employer]ATION PATIENT INSTRUCTIONS
Discharge Summary/Instructions after an Endoscopic Procedure  Patient Name: Chester Red  Patient MRN: [id number]  Patient YOB: 1995  Friday, March 22, 2024  Sharad Christie MD  Dear patient,  As a result of recent federal legislation (The Federal Cures Act), you may   receive lab or pathology results from your procedure in your [facility]   account before your physician is able to contact you. Your physician or   their representative will relay the results to you with their   recommendations at their soonest availability.  Thank you,  RESTRICTIONS:  During your procedure today, you received medications for sedation.  These   medications may affect your judgment, balance and coordination.  Therefore,   for 24 hours, you have the following restrictions:   - DO NOT drive a car, operate machinery, make legal/financial decisions,   sign important papers or drink alcohol.    ACTIVITY:  Today: no heavy lifting, straining or running due to procedural   sedation/anesthesia.  The following day: return to full activity including work.  DIET:  Eat and drink normally unless instructed otherwise.     TREATMENT FOR COMMON SIDE EFFECTS:  - Mild abdominal pain, nausea, belching, bloating or excessive gas:  rest,   eat lightly and use a heating pad.  - Sore Throat: treat with throat lozenges and/or gargle with warm salt   water.  - Because air was used during the procedure, expelling large amounts of air   from your rectum or belching is normal.  - If a bowel prep was taken, you may not have a bowel movement for 1-3 days.    This is normal.  SYMPTOMS TO WATCH FOR AND REPORT TO YOUR PHYSICIAN:  1. Abdominal pain or bloating, other than gas cramps.  2. Chest pain.  3. Back pain.  4. Signs of infection such as: chills or fever occurring within 24 hours   after the procedure.  5. Rectal bleeding, which would show as bright red, maroon, or black stools.   (A tablespoon of blood from the rectum is not serious, especially if    hemorrhoids are present.)  6. Vomiting.  7. Weakness or dizziness.  GO DIRECTLY TO THE NEAREST EMERGENCY ROOM IF YOU HAVE ANY OF THE FOLLOWING:      Difficulty breathing              Chills and/or fever over 101 F   Persistent vomiting and/or vomiting blood   Severe abdominal pain   Severe chest pain   Black, tarry stools   Bleeding- more than one tablespoon   Any other symptom or condition that you feel may need urgent attention  Your doctor recommends these additional instructions:  If any biopsies were taken, your doctors clinic will contact you in 1 to 2   weeks with any results.  - Patient has a contact number available for emergencies.  The signs and   symptoms of potential delayed complications were discussed with the   patient.  Return to normal activities tomorrow.  Written discharge   instructions were provided to the patient.   - High fiber diet.   - Continue present medications.   - Repeat colonoscopy in 5 years for surveillance.   - Await pathology results.   - Discharge patient to home (ambulatory).   - Return to GI office PRN.  For questions, problems or results please call your physician - Sharad Christie MD at Work:  [phone].  [facility], EMERGENCY ROOM PHONE NUMBER: [phone]  IF A COMPLICATION OR EMERGENCY SITUATION ARISES AND YOU ARE UNABLE TO REACH   YOUR PHYSICIAN - GO DIRECTLY TO THE EMERGENCY ROOM.  Sharad Christie MD  3/22/2024 11:39:42 AM  This report has been verified and signed electronically.  Dear patient,  As a result of recent federal legislation (The Federal Cures Act), you may   receive lab or pathology results from your procedure in your [facility]   account before your physician is able to contact you. Your physician or   their representative will relay the results to you with their   recommendations at their soonest availability.  Thank you,  [employer]OVATION

## 2024-03-22 NOTE — ANESTHESIA POSTPROCEDURE EVALUATION
Anesthesia Post Evaluation    Patient: Chester Red Jr.    Procedure(s) Performed: Procedure(s) (LRB):  COLONOSCOPY (N/A)    Final Anesthesia Type: general      Patient location during evaluation: PACU  Patient participation: Yes- Able to Participate  Level of consciousness: awake and alert and oriented  Post-procedure vital signs: reviewed and stable  Pain management: adequate  Airway patency: patent    PONV status at discharge: No PONV  Anesthetic complications: no      Cardiovascular status: blood pressure returned to baseline and stable  Respiratory status: unassisted and spontaneous ventilation  Hydration status: euvolemic  Follow-up not needed.              Vitals Value Taken Time   /69 03/22/24 1158   Temp 36.4 °C (97.5 °F) 03/22/24 1158   Pulse 73 03/22/24 1158   Resp 18 03/22/24 1158   SpO2 100 % 03/22/24 1158         No case tracking events are documented in the log.      Pain/Niru Score: Niru Score: 10 (3/22/2024 11:57 AM)

## 2024-03-22 NOTE — H&P
CC: Diarrhea, abdominal pain    28 year old male with above. States that symptoms are stable, no alleviating/exacerbating factors. No family history of colorectal CA. No personal history of polyps. No bleeding or weight loss.     ROS:  No headache, no fever/chills, no chest pain/SOB, no nausea/vomiting/diarrhea/constipation/GI bleeding/abdominal pain, no dysuria/hematuria.    VSSAF   Exam:   Alert and oriented x 3; no apparent distress   PERRLA, sclera anicteric  CV: Regular rate/rhythm, normal PMI   Lungs: Clear bilaterally with no wheeze/rales   Abdomen: Soft, NT/ND, normal bowel sounds   Ext: No cyanosis, clubbing     Impression:   As above    Plan:   Proceed with endoscopy. Further recs to follow.

## 2024-03-22 NOTE — ANESTHESIA PREPROCEDURE EVALUATION
03/22/2024  Chester Red Jr. is a 28 y.o., male.      Pre-op Assessment    I have reviewed the Patient Summary Reports.     I have reviewed the Nursing Notes. I have reviewed the NPO Status.   I have reviewed the Medications.     Review of Systems  Anesthesia Hx:  No problems with previous Anesthesia                Social:  Smoker       Cardiovascular:     Hypertension, well controlled                                        Pulmonary:  Pulmonary Normal                       Renal/:  Renal/ Normal                 Hepatic/GI:     GERD             Neurological:  Neurology Normal                                      Endocrine:  Endocrine Normal          Obesity / BMI > 30      Physical Exam  General: Well nourished, Cooperative, Alert and Oriented    Airway:  Mallampati: II   Mouth Opening: Normal  TM Distance: Normal  Neck ROM: Normal ROM    Anesthesia Plan  Type of Anesthesia, risks & benefits discussed:    Anesthesia Type: Gen ETT, Gen Supraglottic Airway, Gen Natural Airway, MAC  Intra-op Monitoring Plan: Standard ASA Monitors  Post Op Pain Control Plan: multimodal analgesia  Induction:  IV  Airway Plan: Direct, Video and Fiberoptic, Post-Induction  Informed Consent: Informed consent signed with the Patient and all parties understand the risks and agree with anesthesia plan.  All questions answered.   ASA Score: 2    Ready For Surgery From Anesthesia Perspective.   .

## 2024-03-22 NOTE — PLAN OF CARE
1143 Assumed care, vss, see assess. Nadn. Will monitor.   1222 md in to speak with pt.  1224 Pt tolerating po fluids, pain controlled. All belongings returned to pt. D/c instructions given and explained to pt, pt v/u. D/c'd out to pov via wc per staff with anjum.

## 2024-03-26 NOTE — PROGRESS NOTES
Please advise patient that polyp pathology was reviewed and is benign and is the nonadenomatous type which is not precancerous/risk factor for cancer.  Repeat colonoscopy recommended at age 43 and not in 5 years as previously thought.  No evidence of colitis on biopsies.  Place reminder in system for repeat colonoscopy.

## 2024-04-01 ENCOUNTER — ON-DEMAND VIRTUAL (OUTPATIENT)
Dept: URGENT CARE | Facility: CLINIC | Age: 29
End: 2024-04-01
Payer: COMMERCIAL

## 2024-04-01 DIAGNOSIS — J06.9 URI WITH COUGH AND CONGESTION: Primary | ICD-10-CM

## 2024-04-01 PROCEDURE — 99213 OFFICE O/P EST LOW 20 MIN: CPT | Mod: 95,,, | Performed by: NURSE PRACTITIONER

## 2024-04-01 RX ORDER — ALBUTEROL SULFATE 90 UG/1
2 AEROSOL, METERED RESPIRATORY (INHALATION) EVERY 6 HOURS PRN
Qty: 18 G | Refills: 0 | Status: SHIPPED | OUTPATIENT
Start: 2024-04-01 | End: 2025-04-01

## 2024-04-01 RX ORDER — PREDNISONE 20 MG/1
20 TABLET ORAL DAILY
Qty: 5 TABLET | Refills: 0 | Status: SHIPPED | OUTPATIENT
Start: 2024-04-01 | End: 2024-04-06

## 2024-04-01 NOTE — PROGRESS NOTES
Subjective:      Patient ID: Chester Red Jr. is a 29 y.o. male.    Vitals:  vitals were not taken for this visit.     Chief Complaint: URI and Chest Pain      Visit Type: TELE AUDIOVISUAL    Present with the patient at the time of consultation: TELEMED PRESENT WITH PATIENT: None        Past Medical History:   Diagnosis Date    Acid reflux     Gall stones     Hypertension      Past Surgical History:   Procedure Laterality Date    ACHILLES TENDON SURGERY      COLONOSCOPY N/A 3/22/2024    Procedure: COLONOSCOPY;  Surgeon: Sharad Garcia MD;  Location: [facility];  Service: Endoscopy;  Laterality: N/A;    ESOPHAGOGASTRODUODENOSCOPY N/A 08/01/2022    Procedure: EGD (ESOPHAGOGASTRODUODENOSCOPY);  Surgeon: Bubba Lu MD;  Location: [facility];  Service: Endoscopy;  Laterality: N/A;    UPPER GASTROINTESTINAL ENDOSCOPY       Review of patient's allergies indicates:  No Known Allergies  Current Outpatient Medications on File Prior to Visit   Medication Sig Dispense Refill    famotidine (PEPCID) 20 MG tablet Take 1 tablet (20 mg total) by mouth 2 (two) times daily. 60 tablet 11    omeprazole (PRILOSEC) 40 MG capsule Take 1 capsule (40 mg total) by mouth every morning. 90 capsule 0     No current facility-administered medications on file prior to visit.     Family History   Problem Relation Age of Onset    Prostate cancer Paternal Grandfather     Colon cancer Neg Hx     Colon polyps Neg Hx     Esophageal cancer Neg Hx     Stomach cancer Neg Hx        Medications Ordered                [facility] - [place] - [address] [address] - [address] [facility] [address]   [address] [facility] [address] [address]    Telephone: [phone]   Fax: [phone]   Hours: Not open 24 hours                         E-Prescribed (2 of 2)              albuterol (VENTOLIN HFA) 90 mcg/actuation inhaler    Sig: Inhale 2 puffs into the lungs every 6 (six) hours as needed for Wheezing. Rescue       Start: 4/1/24     Quantity: 18  g Refills: 0                         predniSONE (DELTASONE) 20 MG tablet    Sig: Take 1 tablet (20 mg total) by mouth once daily. for 5 days       Start: 4/1/24     Quantity: 5 tablet Refills: 0                           Ohs Peq Odvv Intake    4/1/2024  5:04 PM CDT - Filed by Patient   What is your current physical address in the event of a medical emergency? [id number] [id number]   Are you able to take your vital signs? No   Please attach any relevant images or files          28 yo male with c/o cough and wheezing. He states he has cough Upper Respiratory Infection: Patient complains of symptoms of a URI. Symptoms include congestion, coryza, and cough. Onset of symptoms was 2 weeks ago, unchanged since that time. He also c/o congestion, coryza, cough described as nonproductive, and no  fever for the past 2 weeks .  He is drinking plenty of fluids. Evaluation to date: none. Treatment to date: decongestants.          URI   Associated symptoms include chest pain, congestion, coughing and wheezing. Pertinent negatives include no dysuria or sore throat.   Chest Pain   Associated symptoms include a cough and sputum production. Pertinent negatives include no back pain, fever or shortness of breath.       Constitution: Negative. Negative for fever.   HENT:  Positive for congestion. Negative for sore throat.    Cardiovascular:  Positive for chest pain.   Eyes: Negative.    Respiratory:  Positive for chest tightness, cough, sputum production and wheezing. Negative for shortness of breath.    Gastrointestinal: Negative.  Negative for bowel incontinence.   Endocrine: negative.   Genitourinary: Negative.  Negative for dysuria, flank pain, bladder incontinence and pelvic pain.   Musculoskeletal: Negative.  Negative for pain, abnormal ROM of joint and back pain.   Skin: Negative.    Allergic/Immunologic: Negative.    Neurological: Negative.    Hematologic/Lymphatic: Negative.    Psychiatric/Behavioral: Negative.           Objective:   The physical exam was conducted virtually.  LOCATION OF PATIENT home  Physical Exam   Constitutional: He is oriented to person, place, and time. He appears well-developed.   HENT:   Head: Normocephalic and atraumatic.   Ears:   Right Ear: Hearing, tympanic membrane and external ear normal.   Left Ear: Hearing, tympanic membrane and external ear normal.   Nose: Rhinorrhea and congestion present.   Mouth/Throat: Uvula is midline, oropharynx is clear and moist and mucous membranes are normal.   Eyes: Conjunctivae and EOM are normal. Pupils are equal, round, and reactive to light.   Neck: Neck supple.   Cardiovascular: Normal rate.   Pulmonary/Chest: Effort normal and breath sounds normal.   Musculoskeletal: Normal range of motion.         General: Normal range of motion.   Neurological: He is alert and oriented to person, place, and time.   Skin: Skin is warm.   Psychiatric: His behavior is normal. Thought content normal.   Nursing note and vitals reviewed.      Assessment:     1. URI with cough and congestion        Plan:   -Below are suggestions for symptomatic relief:              -Tylenol every 4 hours OR ibuprofen every 6 hours as needed for pain/fever.              -Salt water gargles to soothe throat pain.              -Chloroseptic spray also helps to numb throat pain.              -Nasal saline spray reduces inflammation and dryness.              -Warm face compresses to help with facial sinus pain/pressure.              -Vicks vapor rub at night.              -Flonase OTC or Nasacort OTC for nasal congestion.              -Simple foods like chicken noodle soup.              -Delsym helps with coughing at night              -Zyrtec/Claritin during the day & Benadryl at night may help with allergies.     If you DO NOT have Hypertension or any history of palpitations, it is ok to take over the counter Sudafed or Mucinex D or Allegra-D or Claritin-D or Zyrtec-D.  If you do take one of the above,  it is ok to combine that with plain over the counter Mucinex or Allegra or Claritin or Zyrtec. If, for example, you are taking Zyrtec -D, you can combine that with Mucinex, but not Mucinex-D.  If you are taking Mucinex-D, you can combine that with plain Allegra or Claritin or Zyrtec.   If you DO have Hypertension or palpitations, it is safe to take Coricidin HBP for relief of sinus symptoms.     Please follow up with your Primary care provider within 2-5 days if your signs and symptoms have not resolved or worsen.      If your condition worsens or fails to improve we recommend that you receive another evaluation at the emergency room immediately or contact your primary medical clinic to discuss your concerns.   You must understand that you have received an Urgent Care treatment only and that you may be released before all of your medical problems are known or treated. You, the patient, will arrange for follow up care as instructed.      RED FLAGS/WARNING SYMPTOMS DISCUSSED WITH PATIENT THAT WOULD WARRANT EMERGENT MEDICAL ATTENTION. PATIENT VERBALIZED UNDERSTANDING.       URI with cough and congestion  -     predniSONE (DELTASONE) 20 MG tablet; Take 1 tablet (20 mg total) by mouth once daily. for 5 days  Dispense: 5 tablet; Refill: 0  -     albuterol (VENTOLIN HFA) 90 mcg/actuation inhaler; Inhale 2 puffs into the lungs every 6 (six) hours as needed for Wheezing. Rescue  Dispense: 18 g; Refill: 0

## 2024-04-09 ENCOUNTER — OFFICE VISIT (OUTPATIENT)
Dept: FAMILY MEDICINE | Facility: CLINIC | Age: 29
End: 2024-04-09
Payer: COMMERCIAL

## 2024-04-09 VITALS
HEIGHT: 71 IN | OXYGEN SATURATION: 96 % | BODY MASS INDEX: 34.69 KG/M2 | WEIGHT: 247.81 LBS | TEMPERATURE: 98 F | DIASTOLIC BLOOD PRESSURE: 82 MMHG | SYSTOLIC BLOOD PRESSURE: 100 MMHG | HEART RATE: 68 BPM

## 2024-04-09 DIAGNOSIS — R07.89 ATYPICAL CHEST PAIN: Primary | ICD-10-CM

## 2024-04-09 PROCEDURE — 93010 ELECTROCARDIOGRAM REPORT: CPT | Mod: S$GLB,,, | Performed by: INTERNAL MEDICINE

## 2024-04-09 PROCEDURE — 1160F RVW MEDS BY RX/DR IN RCRD: CPT | Mod: CPTII,S$GLB,, | Performed by: FAMILY MEDICINE

## 2024-04-09 PROCEDURE — 99999 PR PBB SHADOW E&M-EST. PATIENT-LVL IV: CPT | Mod: PBBFAC,,, | Performed by: FAMILY MEDICINE

## 2024-04-09 PROCEDURE — 3008F BODY MASS INDEX DOCD: CPT | Mod: CPTII,S$GLB,, | Performed by: FAMILY MEDICINE

## 2024-04-09 PROCEDURE — 99213 OFFICE O/P EST LOW 20 MIN: CPT | Mod: S$GLB,,, | Performed by: FAMILY MEDICINE

## 2024-04-09 PROCEDURE — 93005 ELECTROCARDIOGRAM TRACING: CPT | Mod: S$GLB,,, | Performed by: FAMILY MEDICINE

## 2024-04-09 PROCEDURE — 1159F MED LIST DOCD IN RCRD: CPT | Mod: CPTII,S$GLB,, | Performed by: FAMILY MEDICINE

## 2024-04-09 PROCEDURE — 3074F SYST BP LT 130 MM HG: CPT | Mod: CPTII,S$GLB,, | Performed by: FAMILY MEDICINE

## 2024-04-09 PROCEDURE — 3079F DIAST BP 80-89 MM HG: CPT | Mod: CPTII,S$GLB,, | Performed by: FAMILY MEDICINE

## 2024-04-09 NOTE — PATIENT INSTRUCTIONS
Contact me or your PCP if any worsening or for any new concerns as we discussed.     If any more pain to breathe, try Aleve or Ibuprofen .

## 2024-04-09 NOTE — PROGRESS NOTES
Subjective:       Patient ID: Chester Red Jr. is a 29 y.o. male.    Chief Complaint: No chief complaint on file.    New to me patient here for UC visit.  Yesterday he felt some pain in left side of chest - back, side and front at times with breathing.  Recent URI with decreasing cough; white sputum.  No chest pain today.  Also had pain in left arm at one point yesterday - not temporally related to the chest pain.  No SOB or anginal type CP or sweats.  He has nml blood pressure and nml lipid profile in past.      Review of Systems   Constitutional:  Negative for fever.   Respiratory:  Negative for shortness of breath.    Cardiovascular:  Positive for chest pain.   Gastrointestinal:  Negative for abdominal pain and nausea.   Skin:  Negative for rash.   Neurological:  Negative for numbness.   All other systems reviewed and are negative.      Objective:      Physical Exam  Vitals reviewed.   Constitutional:       General: He is not in acute distress.     Appearance: Normal appearance. He is well-developed.   HENT:      Mouth/Throat:      Mouth: Mucous membranes are moist.      Pharynx: No posterior oropharyngeal erythema.   Cardiovascular:      Rate and Rhythm: Normal rate and regular rhythm.      Heart sounds: No murmur heard.  Pulmonary:      Effort: Pulmonary effort is normal.      Breath sounds: Normal breath sounds.   Musculoskeletal:      Cervical back: Neck supple.   Lymphadenopathy:      Cervical: No cervical adenopathy.   Skin:     General: Skin is warm and dry.   Neurological:      Mental Status: He is alert.         Assessment:       1. Atypical chest pain        Plan:       Atypical chest pain  -     EKG 12-lead - NSR, nml ECG and no change from previous.        Patient Instructions   Contact me or your PCP if any worsening or for any new concerns as we discussed.     If any more pain to breathe, try Aleve or Ibuprofen .

## 2024-04-10 LAB
OHS QRS DURATION: 92 MS
OHS QTC CALCULATION: 402 MS

## 2024-04-17 ENCOUNTER — OFFICE VISIT (OUTPATIENT)
Dept: FAMILY MEDICINE | Facility: CLINIC | Age: 29
End: 2024-04-17
Payer: COMMERCIAL

## 2024-04-17 ENCOUNTER — LAB VISIT (OUTPATIENT)
Dept: LAB | Facility: HOSPITAL | Age: 29
End: 2024-04-17
Payer: COMMERCIAL

## 2024-04-17 VITALS
OXYGEN SATURATION: 97 % | HEIGHT: 71 IN | WEIGHT: 248 LBS | HEART RATE: 64 BPM | DIASTOLIC BLOOD PRESSURE: 82 MMHG | SYSTOLIC BLOOD PRESSURE: 122 MMHG | RESPIRATION RATE: 16 BRPM | TEMPERATURE: 98 F | BODY MASS INDEX: 34.72 KG/M2

## 2024-04-17 DIAGNOSIS — Z00.00 WELLNESS EXAMINATION: Primary | ICD-10-CM

## 2024-04-17 DIAGNOSIS — Z00.00 WELLNESS EXAMINATION: ICD-10-CM

## 2024-04-17 DIAGNOSIS — E66.9 OBESITY (BMI 30.0-34.9): ICD-10-CM

## 2024-04-17 DIAGNOSIS — K21.9 GASTROESOPHAGEAL REFLUX DISEASE, UNSPECIFIED WHETHER ESOPHAGITIS PRESENT: ICD-10-CM

## 2024-04-17 DIAGNOSIS — R21 RASH OF GENITALIA: ICD-10-CM

## 2024-04-17 LAB
ALBUMIN SERPL BCP-MCNC: 3.8 G/DL (ref 3.5–5.2)
ALP SERPL-CCNC: 58 U/L (ref 55–135)
ALT SERPL W/O P-5'-P-CCNC: 26 U/L (ref 10–44)
ANION GAP SERPL CALC-SCNC: 8 MMOL/L (ref 8–16)
AST SERPL-CCNC: 28 U/L (ref 10–40)
BASOPHILS # BLD AUTO: 0.07 K/UL (ref 0–0.2)
BASOPHILS NFR BLD: 1.2 % (ref 0–1.9)
BILIRUB SERPL-MCNC: 0.5 MG/DL (ref 0.1–1)
BUN SERPL-MCNC: 12 MG/DL (ref 6–20)
CALCIUM SERPL-MCNC: 9.7 MG/DL (ref 8.7–10.5)
CHLORIDE SERPL-SCNC: 105 MMOL/L (ref 95–110)
CHOLEST SERPL-MCNC: 154 MG/DL (ref 120–199)
CHOLEST/HDLC SERPL: 3.9 {RATIO} (ref 2–5)
CO2 SERPL-SCNC: 27 MMOL/L (ref 23–29)
CREAT SERPL-MCNC: 1.1 MG/DL (ref 0.5–1.4)
DIFFERENTIAL METHOD BLD: ABNORMAL
EOSINOPHIL # BLD AUTO: 0.4 K/UL (ref 0–0.5)
EOSINOPHIL NFR BLD: 6.2 % (ref 0–8)
ERYTHROCYTE [DISTWIDTH] IN BLOOD BY AUTOMATED COUNT: 14.3 % (ref 11.5–14.5)
EST. GFR  (NO RACE VARIABLE): >60 ML/MIN/1.73 M^2
ESTIMATED AVG GLUCOSE: 117 MG/DL (ref 68–131)
GLUCOSE SERPL-MCNC: 70 MG/DL (ref 70–110)
HBA1C MFR BLD: 5.7 % (ref 4–5.6)
HCT VFR BLD AUTO: 43.3 % (ref 40–54)
HDLC SERPL-MCNC: 39 MG/DL (ref 40–75)
HDLC SERPL: 25.3 % (ref 20–50)
HGB BLD-MCNC: 13.8 G/DL (ref 14–18)
HIV 1+2 AB+HIV1 P24 AG SERPL QL IA: NORMAL
IMM GRANULOCYTES # BLD AUTO: 0.03 K/UL (ref 0–0.04)
IMM GRANULOCYTES NFR BLD AUTO: 0.5 % (ref 0–0.5)
LDLC SERPL CALC-MCNC: 70.6 MG/DL (ref 63–159)
LYMPHOCYTES # BLD AUTO: 2.5 K/UL (ref 1–4.8)
LYMPHOCYTES NFR BLD: 42.9 % (ref 18–48)
MCH RBC QN AUTO: 27.2 PG (ref 27–31)
MCHC RBC AUTO-ENTMCNC: 31.9 G/DL (ref 32–36)
MCV RBC AUTO: 85 FL (ref 82–98)
MONOCYTES # BLD AUTO: 0.6 K/UL (ref 0.3–1)
MONOCYTES NFR BLD: 10.3 % (ref 4–15)
NEUTROPHILS # BLD AUTO: 2.3 K/UL (ref 1.8–7.7)
NEUTROPHILS NFR BLD: 38.9 % (ref 38–73)
NONHDLC SERPL-MCNC: 115 MG/DL
NRBC BLD-RTO: 0 /100 WBC
PLATELET # BLD AUTO: 282 K/UL (ref 150–450)
PMV BLD AUTO: 10.8 FL (ref 9.2–12.9)
POTASSIUM SERPL-SCNC: 4.3 MMOL/L (ref 3.5–5.1)
PROT SERPL-MCNC: 7.2 G/DL (ref 6–8.4)
RBC # BLD AUTO: 5.07 M/UL (ref 4.6–6.2)
SODIUM SERPL-SCNC: 140 MMOL/L (ref 136–145)
TREPONEMA PALLIDUM IGG+IGM AB [PRESENCE] IN SERUM OR PLASMA BY IMMUNOASSAY: NONREACTIVE
TRIGL SERPL-MCNC: 222 MG/DL (ref 30–150)
WBC # BLD AUTO: 5.8 K/UL (ref 3.9–12.7)

## 2024-04-17 PROCEDURE — 87389 HIV-1 AG W/HIV-1&-2 AB AG IA: CPT

## 2024-04-17 PROCEDURE — 83036 HEMOGLOBIN GLYCOSYLATED A1C: CPT

## 2024-04-17 PROCEDURE — 99395 PREV VISIT EST AGE 18-39: CPT | Mod: S$GLB,,,

## 2024-04-17 PROCEDURE — 80053 COMPREHEN METABOLIC PANEL: CPT

## 2024-04-17 PROCEDURE — 86593 SYPHILIS TEST NON-TREP QUANT: CPT

## 2024-04-17 PROCEDURE — 36415 COLL VENOUS BLD VENIPUNCTURE: CPT | Mod: PO

## 2024-04-17 PROCEDURE — 3079F DIAST BP 80-89 MM HG: CPT | Mod: CPTII,S$GLB,,

## 2024-04-17 PROCEDURE — 85025 COMPLETE CBC W/AUTO DIFF WBC: CPT

## 2024-04-17 PROCEDURE — 1160F RVW MEDS BY RX/DR IN RCRD: CPT | Mod: CPTII,S$GLB,,

## 2024-04-17 PROCEDURE — 99999 PR PBB SHADOW E&M-EST. PATIENT-LVL IV: CPT | Mod: PBBFAC,,,

## 2024-04-17 PROCEDURE — 80061 LIPID PANEL: CPT

## 2024-04-17 PROCEDURE — 3074F SYST BP LT 130 MM HG: CPT | Mod: CPTII,S$GLB,,

## 2024-04-17 PROCEDURE — 1159F MED LIST DOCD IN RCRD: CPT | Mod: CPTII,S$GLB,,

## 2024-04-17 PROCEDURE — 3008F BODY MASS INDEX DOCD: CPT | Mod: CPTII,S$GLB,,

## 2024-04-17 NOTE — PROGRESS NOTES
Subjective:       Patient ID: Chester Red Jr. is a 29 y.o. male.    Chief Complaint: Annual Exam    Presents for annual wellness exam. No acute health complaints at this time. Had a rash/lesions on his penis recently but this resolved. Painless and did not itch. Denies discharge, testicular pain, dysuria symptoms. Sexually active with one partner without condom use. He did start using a new soap recently.     GERD. Well controlled lately.         Past Medical History:   Diagnosis Date    Acid reflux     Gall stones     Hypertension        Review of patient's allergies indicates:  No Known Allergies      Current Outpatient Medications:     omeprazole (PRILOSEC) 40 MG capsule, Take 1 capsule (40 mg total) by mouth every morning., Disp: 90 capsule, Rfl: 0    albuterol (VENTOLIN HFA) 90 mcg/actuation inhaler, Inhale 2 puffs into the lungs every 6 (six) hours as needed for Wheezing. Rescue (Patient not taking: Reported on 4/17/2024), Disp: 18 g, Rfl: 0    famotidine (PEPCID) 20 MG tablet, Take 1 tablet (20 mg total) by mouth 2 (two) times daily. (Patient not taking: Reported on 4/17/2024), Disp: 60 tablet, Rfl: 11    Review of Systems   Constitutional:  Negative for activity change, appetite change, chills, diaphoresis, fatigue, fever and unexpected weight change.   HENT:  Negative for congestion, ear pain, postnasal drip, rhinorrhea, sinus pressure, sneezing, sore throat and trouble swallowing.    Eyes:  Negative for pain, itching and visual disturbance.   Respiratory:  Negative for cough, chest tightness, shortness of breath and wheezing.    Cardiovascular:  Negative for chest pain, palpitations and leg swelling.   Gastrointestinal:  Negative for abdominal distention, abdominal pain, blood in stool, constipation, diarrhea, nausea and vomiting.   Endocrine: Negative for cold intolerance and heat intolerance.   Genitourinary:  Positive for genital sores. Negative for difficulty urinating, dysuria, frequency,  "hematuria, penile discharge, penile pain, penile swelling, testicular pain and urgency.   Musculoskeletal:  Negative for arthralgias, back pain, myalgias and neck pain.   Skin:  Negative for color change, pallor, rash and wound.   Neurological:  Negative for dizziness, syncope, weakness, numbness and headaches.   Hematological:  Negative for adenopathy.   Psychiatric/Behavioral:  Negative for behavioral problems. The patient is not nervous/anxious.        Objective:      /82 (BP Location: Right arm, Patient Position: Sitting, BP Method: Large (Manual))   Pulse 64   Temp 97.9 °F (36.6 °C) (Oral)   Resp 16   Ht 5' 11" (1.803 m)   Wt 112.5 kg (248 lb 0.3 oz)   SpO2 97%   BMI 34.59 kg/m²   Physical Exam  Vitals reviewed.   Constitutional:       General: He is not in acute distress.     Appearance: Normal appearance. He is obese. He is not ill-appearing, toxic-appearing or diaphoretic.   HENT:      Head: Normocephalic.      Right Ear: External ear normal.      Left Ear: External ear normal.      Nose: Nose normal. No congestion or rhinorrhea.      Mouth/Throat:      Mouth: Mucous membranes are moist.      Pharynx: Oropharynx is clear.   Eyes:      General: No scleral icterus.        Right eye: No discharge.         Left eye: No discharge.      Extraocular Movements: Extraocular movements intact.      Conjunctiva/sclera: Conjunctivae normal.   Cardiovascular:      Rate and Rhythm: Normal rate and regular rhythm.      Pulses: Normal pulses.      Heart sounds: Normal heart sounds. No murmur heard.     No friction rub. No gallop.   Pulmonary:      Effort: Pulmonary effort is normal. No respiratory distress.      Breath sounds: Normal breath sounds. No wheezing, rhonchi or rales.   Chest:      Chest wall: No tenderness.   Musculoskeletal:         General: No swelling, tenderness or deformity. Normal range of motion.      Cervical back: Normal range of motion.      Right lower leg: No edema.      Left lower leg: " No edema.   Skin:     General: Skin is warm and dry.      Capillary Refill: Capillary refill takes less than 2 seconds.      Coloration: Skin is not jaundiced.      Findings: No bruising, erythema, lesion or rash.   Neurological:      Mental Status: He is alert and oriented to person, place, and time.      Gait: Gait normal.   Psychiatric:         Mood and Affect: Mood normal.         Behavior: Behavior normal.         Thought Content: Thought content normal.         Judgment: Judgment normal.         Assessment:       1. Wellness examination    2. Gastroesophageal reflux disease, unspecified whether esophagitis present    3. Obesity (BMI 30.0-34.9)    4. Rash of genitalia        Plan:       Wellness examination  -     Comprehensive Metabolic Panel; Future; Expected date: 04/17/2024  -     Hemoglobin A1C; Future; Expected date: 04/17/2024  -     Lipid Panel; Future; Expected date: 04/17/2024  -     CBC Auto Differential; Future; Expected date: 04/17/2024    Gastroesophageal reflux disease, unspecified whether esophagitis present        -    Stable. Continue meds. Will continue to monitor.     Obesity (BMI 30.0-34.9)        -     Patient would benefit from healthy diet, exercise and weight loss. Overall health and wellbeing would likely improve.      Rash of genitalia  -     Gonococcus, Amplified DNA [facility]; Urine; Future; Expected date: 04/17/2024  -     HIV 1/2 Ag/Ab (4th Gen); Future; Expected date: 04/17/2024  -     C. trachomatis/N. gonorrhoeae by AMP DNA [facility]; Urine; Future; Expected date: 04/17/2024  -     Treponema Pallidium Antibodies IgG, IgM; Future; Expected date: 04/17/2024        -     Monitor for return of symptoms. Come see me in clinic if it does reoccur. Testing as above.                Darío Rios PA-C  Family Medicine Physician Assistant       Future Appointments       Date Provider Specialty Appt Notes    4/17/2024  Lab Rash of genitalia    4/17/2024  Lab Rash of genitalia    4/21/2025  Darío Rios PA-C Family Medicine Annual               I spent a total of 20 minutes on the day of the visit.This includes face to face time and non-face to face time preparing to see the patient (eg, review of tests), obtaining and/or reviewing separately obtained history, documenting clinical information in the electronic or other health record, independently interpreting results and communicating results to the patient/family/caregiver, or care coordinator.      We have addressed [4] Moderate: 1 or more chronic illnesses with exacerbation, progression, or side effects of treatment / 2 or more stable chronic illnesses / 1 undiagnosed new problem with uncertain prognosis / 1 acute illness with systemic symptoms / 1 acute complicated injury  The complexity of the data reviewed and analyzed for this visit was [3] Limited (Reviewed prior external note, ordered unique testing or reviewed the results of each unique test)   The risk of complications and/or morbidity or mortality are [4] Moderate risk (I.e. prescription drug management / decision regarding minor surgery with identified pt or procedure risk factors / decision regarding elective major surgery without identified pt or procedure risk factors / diagnosis or treatment significantly limited by social determinants of health)   The level of Medical Decision Making for this visit is [4] Moderate

## 2024-04-19 ENCOUNTER — TELEPHONE (OUTPATIENT)
Dept: FAMILY MEDICINE | Facility: CLINIC | Age: 29
End: 2024-04-19
Payer: COMMERCIAL

## 2024-04-19 NOTE — TELEPHONE ENCOUNTER
----- Message from Darío Rios PA-C sent at 4/19/2024 10:37 AM CDT -----  Negative chlamydia and gonorrheae testing

## 2024-05-31 DIAGNOSIS — R12 HEARTBURN: ICD-10-CM

## 2024-05-31 DIAGNOSIS — R10.10 PAIN OF UPPER ABDOMEN: ICD-10-CM

## 2024-05-31 DIAGNOSIS — K21.9 GASTROESOPHAGEAL REFLUX DISEASE, UNSPECIFIED WHETHER ESOPHAGITIS PRESENT: ICD-10-CM

## 2024-05-31 RX ORDER — OMEPRAZOLE 40 MG/1
40 CAPSULE, DELAYED RELEASE ORAL EVERY MORNING
Qty: 90 CAPSULE | Refills: 1 | Status: SHIPPED | OUTPATIENT
Start: 2024-05-31 | End: 2024-11-27

## 2024-06-12 ENCOUNTER — OFFICE VISIT (OUTPATIENT)
Dept: GASTROENTEROLOGY | Facility: CLINIC | Age: 29
End: 2024-06-12
Payer: COMMERCIAL

## 2024-06-12 VITALS — WEIGHT: 252.88 LBS | HEIGHT: 71 IN | BODY MASS INDEX: 35.4 KG/M2

## 2024-06-12 DIAGNOSIS — R12 HEARTBURN: ICD-10-CM

## 2024-06-12 DIAGNOSIS — R10.10 UPPER ABDOMINAL PAIN: Primary | ICD-10-CM

## 2024-06-12 DIAGNOSIS — R19.5 GREEN STOOL: ICD-10-CM

## 2024-06-12 DIAGNOSIS — R19.8 STRAINING DURING BOWEL MOVEMENTS: ICD-10-CM

## 2024-06-12 DIAGNOSIS — Z86.010 HISTORY OF COLON POLYPS: ICD-10-CM

## 2024-06-12 DIAGNOSIS — Z79.899 LONG-TERM CURRENT USE OF PROTON PUMP INHIBITOR THERAPY: ICD-10-CM

## 2024-06-12 DIAGNOSIS — K21.9 GASTROESOPHAGEAL REFLUX DISEASE WITHOUT ESOPHAGITIS: ICD-10-CM

## 2024-06-12 DIAGNOSIS — R10.31 RLQ ABDOMINAL PAIN: ICD-10-CM

## 2024-06-12 PROCEDURE — 99213 OFFICE O/P EST LOW 20 MIN: CPT | Mod: PBBFAC,PO

## 2024-06-12 PROCEDURE — 99999 PR PBB SHADOW E&M-EST. PATIENT-LVL III: CPT | Mod: PBBFAC,,,

## 2024-06-12 PROCEDURE — 99214 OFFICE O/P EST MOD 30 MIN: CPT | Mod: S$PBB,,,

## 2024-06-12 RX ORDER — FAMOTIDINE 40 MG/1
40 TABLET, FILM COATED ORAL NIGHTLY PRN
Qty: 90 TABLET | Refills: 3 | Status: SHIPPED | OUTPATIENT
Start: 2024-06-12 | End: 2025-06-07

## 2024-06-12 RX ORDER — DICYCLOMINE HYDROCHLORIDE 10 MG/1
10 CAPSULE ORAL 3 TIMES DAILY PRN
Qty: 90 CAPSULE | Refills: 0 | Status: SHIPPED | OUTPATIENT
Start: 2024-06-12 | End: 2024-07-12

## 2024-06-12 NOTE — PROGRESS NOTES
Subjective:       Patient ID: Chester Red Jr. is a 29 y.o. male Body mass index is 35.27 kg/m².    Chief Complaint: Gastroesophageal Reflux    Established patient of Dr. Lu, Dr. Garcia, and myself.    Abdominal Pain  This is a new problem. The current episode started more than 1 month ago (started 2 months ago). The onset quality is sudden. Episode frequency: once every week or so; frequency varies week to week. Duration: can last up to 2 hours, but varies in duration. The problem has been waxing and waning. The pain is located in the LUQ, RUQ and RLQ (pain  fluctuates between the left in the right upper abdomen & occasionally occurs in the RLQ). The pain is at a severity of 0/10 (denies pain currently). The patient is experiencing no pain. The quality of the pain is sharp (throbbing). The abdominal pain radiates to the left flank. Associated symptoms include belching. Pertinent negatives include no constipation (reports having 4 BMs daily rated stool 1, 3, and sometimes 6 on [place] scale; patient reports intermittent straining and green stools), diarrhea, dysuria, fever, flatus, frequency, headaches, hematochezia, hematuria, melena, nausea, vomiting or weight loss. The pain is aggravated by certain positions and eating (chocolate, which he avoids). The pain is relieved by Bowel movements (hot baths). He has tried proton pump inhibitors and H2 blockers for the symptoms. The treatment provided mild relief. Prior diagnostic workup includes ultrasound, upper endoscopy, lower endoscopy and GI consult (Surgery consult). His past medical history is significant for gallstones and GERD (patient reports he did have frequent heartburn and belching when he ran out of omeprazole for a week, which has improved since restarting medication; diet dependent; past treatments: Pepcid). There is no history of abdominal surgery, colon cancer, Crohn's disease, irritable bowel syndrome, pancreatitis, PUD or ulcerative  colitis. Patient's medical history does not include kidney stones and UTI.     Review of Systems   Constitutional:  Negative for activity change, appetite change, chills, diaphoresis, fatigue, fever, unexpected weight change and weight loss.   HENT:  Negative for sore throat and trouble swallowing.    Respiratory:  Negative for cough, choking and shortness of breath.    Cardiovascular:  Negative for chest pain.   Gastrointestinal:  Positive for abdominal pain. Negative for abdominal distention, anal bleeding, blood in stool, constipation (reports having 4 BMs daily rated stool 1, 3, and sometimes 6 on [place] scale; patient reports intermittent straining and green stools), diarrhea, flatus, hematochezia, melena, nausea, rectal pain and vomiting.   Genitourinary:  Negative for dysuria, frequency and hematuria.   Neurological:  Negative for headaches.       No LMP for male patient.  Past Medical History:   Diagnosis Date    Acid reflux     Gall stones     Hypertension      Past Surgical History:   Procedure Laterality Date    ACHILLES TENDON SURGERY      COLONOSCOPY N/A 3/22/2024    Procedure: COLONOSCOPY;  Surgeon: Sharad Garcia MD;  Location: [facility];  Service: Endoscopy;  Laterality: N/A;    ESOPHAGOGASTRODUODENOSCOPY N/A 08/01/2022    Procedure: EGD (ESOPHAGOGASTRODUODENOSCOPY);  Surgeon: Bubba Lu MD;  Location: [facility];  Service: Endoscopy;  Laterality: N/A;    UPPER GASTROINTESTINAL ENDOSCOPY       Family History   Problem Relation Name Age of Onset    Prostate cancer Paternal Grandfather      Colon cancer Neg Hx      Colon polyps Neg Hx      Esophageal cancer Neg Hx      Stomach cancer Neg Hx       Social History     Tobacco Use    Smoking status: Every Day     Types: Vaping with nicotine    Smokeless tobacco: Never    Tobacco comments:     Cigar   Substance Use Topics    Alcohol use: Not Currently     Comment: stopped a few months ago    Drug use: No     Wt Readings from Last 10 Encounters:    06/12/24 114.7 kg (252 lb 13.9 oz)   04/17/24 112.5 kg (248 lb 0.3 oz)   04/09/24 112.4 kg (247 lb 12.8 oz)   03/22/24 111.1 kg (245 lb)   03/06/24 111.5 kg (245 lb 13 oz)   02/28/24 113.4 kg (250 lb)   02/14/24 115.2 kg (254 lb)   02/07/24 115.1 kg (253 lb 12 oz)   12/01/23 115.4 kg (254 lb 6.6 oz)   04/03/23 111.9 kg (246 lb 11.1 oz)     Lab Results   Component Value Date    WBC 5.80 04/17/2024    HGB 13.8 (L) 04/17/2024    HCT 43.3 04/17/2024    MCV 85 04/17/2024     04/17/2024     CMP  Sodium   Date Value Ref Range Status   04/17/2024 140 136 - 145 mmol/L Final     Potassium   Date Value Ref Range Status   04/17/2024 4.3 3.5 - 5.1 mmol/L Final     Chloride   Date Value Ref Range Status   04/17/2024 105 95 - 110 mmol/L Final     CO2   Date Value Ref Range Status   04/17/2024 27 23 - 29 mmol/L Final     Glucose   Date Value Ref Range Status   04/17/2024 70 70 - 110 mg/dL Final     BUN   Date Value Ref Range Status   04/17/2024 12 6 - 20 mg/dL Final     Creatinine   Date Value Ref Range Status   04/17/2024 1.1 0.5 - 1.4 mg/dL Final     Calcium   Date Value Ref Range Status   04/17/2024 9.7 8.7 - 10.5 mg/dL Final     Total Protein   Date Value Ref Range Status   04/17/2024 7.2 6.0 - 8.4 g/dL Final     Albumin   Date Value Ref Range Status   04/17/2024 3.8 3.5 - 5.2 g/dL Final     Total Bilirubin   Date Value Ref Range Status   04/17/2024 0.5 0.1 - 1.0 mg/dL Final     Comment:     For infants and newborns, interpretation of results should be based  on gestational age, weight and in agreement with clinical  observations.    Premature Infant recommended reference ranges:  Up to 24 hours.............<8.0 mg/dL  Up to 48 hours............<12.0 mg/dL  3-5 days..................<15.0 mg/dL  6-29 days.................<15.0 mg/dL       Alkaline Phosphatase   Date Value Ref Range Status   04/17/2024 58 55 - 135 U/L Final     AST   Date Value Ref Range Status   04/17/2024 28 10 - 40 U/L Final     ALT   Date Value Ref  Range Status   04/17/2024 26 10 - 44 U/L Final     Anion Gap   Date Value Ref Range Status   04/17/2024 8 8 - 16 mmol/L Final     eGFR if    Date Value Ref Range Status   03/25/2022 >60.0 >60 mL/min/1.73 m^2 Final     eGFR if non    Date Value Ref Range Status   03/25/2022 >60.0 >60 mL/min/1.73 m^2 Final     Comment:     Calculation used to obtain the estimated glomerular filtration  rate (eGFR) is the CKD-EPI equation.        Lab Results   Component Value Date    LIPASE 43 11/17/2023     Lab Results   Component Value Date    TSH 1.580 11/17/2022     Reviewed prior medical records including radiology report of esophagram 02/1924, abdominal ultrasound 07/08/2022, chest x-ray 07/12/2023, chest x-ray 11/17/2022, abdominal ultrasound 07/08/2022 & endoscopy history (see surgical history).    Objective:      Physical Exam  Vitals and nursing note reviewed.   Constitutional:       General: He is not in acute distress.     Appearance: Normal appearance. He is obese. He is not ill-appearing.   HENT:      Mouth/Throat:      Lips: Pink. No lesions.   Cardiovascular:      Rate and Rhythm: Normal rate and regular rhythm.      Heart sounds: Normal heart sounds.   Pulmonary:      Effort: Pulmonary effort is normal. No respiratory distress.      Breath sounds: Normal breath sounds.   Abdominal:      General: Bowel sounds are normal. There is no distension or abdominal bruit. There are no signs of injury.      Palpations: Abdomen is soft. There is no shifting dullness, fluid wave, hepatomegaly, splenomegaly or mass.      Tenderness: There is no abdominal tenderness. There is no guarding or rebound. Negative signs include Gloria's sign, Rovsing's sign and McBurney's sign.   Skin:     General: Skin is warm and dry.      Coloration: Skin is not jaundiced or pale.   Neurological:      Mental Status: He is alert and oriented to person, place, and time.   Psychiatric:         Attention and Perception:  Attention normal.         Mood and Affect: Mood normal.         Speech: Speech normal.         Behavior: Behavior normal.         Assessment:       1. Upper abdominal pain    2. RLQ abdominal pain    3. Gastroesophageal reflux disease without esophagitis    4. Heartburn    5. Green stool    6. Straining during bowel movements    7. History of colon polyps    8. Long-term current use of proton pump inhibitor therapy          Plan:       Upper abdominal pain & RLQ abdominal pain  -     CT Abdomen Pelvis With IV Contrast Routine Oral Contrast; Future; Expected date: 06/12/2024  - START: dicyclomine (BENTYL) 10 MG capsule; Take 1 capsule (10 mg total) by mouth 3 (three) times daily as needed (abdominal pain).  Dispense: 90 capsule; Refill: 0  - START:  famotidine (PEPCID) 40 MG tablet; Take 1 tablet (40 mg total) by mouth nightly as needed for Heartburn.  Dispense: 90 tablet; Refill: 3  - consider Carafate  - continue omeprazole 40 mg once daily  -Avoid/limit use of NSAID's, since they can cause GI upset, bleeding, and/or ulcers. If needed, take with food.    Gastroesophageal reflux disease without esophagitis & Heartburn  -Avoid large meals, avoid eating within 2-3 hours of bedtime (avoid late night eating & lying down soon after eating), elevate head of bed if nocturnal symptoms are present, smoking cessation (if current smoker), & weight loss (if overweight).   -Avoid known foods which trigger reflux symptoms & to minimize/avoid high-fat foods, chocolate, caffeine, citrus, alcohol, & tomato products.  -Avoid/limit use of NSAID's, since they can cause GI upset, bleeding, and/or ulcers. If needed, take with food.  - continue omeprazole 40 mg once daily  - START:  famotidine (PEPCID) 40 MG tablet; Take 1 tablet (40 mg total) by mouth nightly as needed for Heartburn.  Dispense: 90 tablet; Refill: 3    Green stool  - Avoid known triggers  -     Occult blood x 1, stool; Future; Expected date: 06/12/2024  -     Stool  culture; Future; Expected date: 06/12/2024    Straining during bowel movements  - avoid constipation and straining with bowel movements; try using an OTC stool softener as directed and increase fiber in diet (20-30 grams daily)/OTC fiber supplement such as metamucil (take as directed)    History of colon polyps  -  follow-up for surveillance colonoscopy 03/2029    Long-term current use of proton pump inhibitor therapy  - preference to use lowest effective dose or discontinuing if possible  - recommend a diet high in calcium and/or taking OTC calcium and vitamin d supplements as directed (such as Citracal +D)  - recommend annual monitoring with blood work to include CMP, CBC, vitamin B12, and magnesium.  -     Vitamin B12; Future; Expected date: 06/12/2024  -     Magnesium; Future; Expected date: 06/12/2024    Follow up in about 4 weeks (around 7/10/2024), or if symptoms worsen or fail to improve.      If no improvement in symptoms or symptoms worsen, call/follow-up at clinic or go to ER.        Total time spent on the encounter includes face to face time and non-face to face time preparing to see the patient (eg, review of tests), Obtaining and/or reviewing separately obtained history, Documenting clinical information in the electronic or other health record, Independently interpreting results (not separately reported) and communicating results to the patient/family/caregiver, or Care coordination (not separately reported).     A dictation software program was used for this note. Please expect some simple typographical  errors in this note.

## 2024-06-19 ENCOUNTER — HOSPITAL ENCOUNTER (OUTPATIENT)
Dept: RADIOLOGY | Facility: HOSPITAL | Age: 29
Discharge: HOME | End: 2024-06-19
Payer: COMMERCIAL

## 2024-06-19 DIAGNOSIS — R10.10 UPPER ABDOMINAL PAIN: ICD-10-CM

## 2024-06-19 DIAGNOSIS — R10.31 RLQ ABDOMINAL PAIN: ICD-10-CM

## 2024-06-19 PROCEDURE — 74177 CT ABD & PELVIS W/CONTRAST: CPT | Mod: 26,,, | Performed by: RADIOLOGY

## 2024-06-19 PROCEDURE — 74177 CT ABD & PELVIS W/CONTRAST: CPT | Mod: TC

## 2024-06-19 PROCEDURE — 25500020 PHARM REV CODE 255

## 2024-06-19 RX ADMIN — IOHEXOL 100 ML: 350 INJECTION, SOLUTION INTRAVENOUS at 10:06

## 2024-07-05 DIAGNOSIS — R12 HEARTBURN: ICD-10-CM

## 2024-07-05 DIAGNOSIS — R10.10 PAIN OF UPPER ABDOMEN: ICD-10-CM

## 2024-07-05 DIAGNOSIS — K21.9 GASTROESOPHAGEAL REFLUX DISEASE, UNSPECIFIED WHETHER ESOPHAGITIS PRESENT: ICD-10-CM

## 2024-07-08 RX ORDER — OMEPRAZOLE 40 MG/1
40 CAPSULE, DELAYED RELEASE ORAL EVERY MORNING
Qty: 90 CAPSULE | Refills: 1 | Status: SHIPPED | OUTPATIENT
Start: 2024-07-08 | End: 2025-01-04

## 2024-09-10 DIAGNOSIS — K21.9 GASTROESOPHAGEAL REFLUX DISEASE, UNSPECIFIED WHETHER ESOPHAGITIS PRESENT: ICD-10-CM

## 2024-09-10 DIAGNOSIS — R10.10 UPPER ABDOMINAL PAIN: ICD-10-CM

## 2024-09-10 DIAGNOSIS — R10.10 PAIN OF UPPER ABDOMEN: ICD-10-CM

## 2024-09-10 DIAGNOSIS — R12 HEARTBURN: ICD-10-CM

## 2024-09-12 RX ORDER — FAMOTIDINE 40 MG/1
40 TABLET, FILM COATED ORAL NIGHTLY PRN
Qty: 90 TABLET | Refills: 2 | Status: SHIPPED | OUTPATIENT
Start: 2024-09-12 | End: 2025-09-07

## 2024-09-12 RX ORDER — OMEPRAZOLE 40 MG/1
40 CAPSULE, DELAYED RELEASE ORAL EVERY MORNING
Qty: 90 CAPSULE | Refills: 2 | Status: SHIPPED | OUTPATIENT
Start: 2024-09-12 | End: 2024-09-13 | Stop reason: SDUPTHER

## 2024-09-13 DIAGNOSIS — K21.9 GASTROESOPHAGEAL REFLUX DISEASE, UNSPECIFIED WHETHER ESOPHAGITIS PRESENT: ICD-10-CM

## 2024-09-13 DIAGNOSIS — R12 HEARTBURN: ICD-10-CM

## 2024-09-13 DIAGNOSIS — R10.10 PAIN OF UPPER ABDOMEN: ICD-10-CM

## 2024-09-13 RX ORDER — OMEPRAZOLE 40 MG/1
40 CAPSULE, DELAYED RELEASE ORAL EVERY MORNING
Qty: 90 CAPSULE | Refills: 2 | Status: SHIPPED | OUTPATIENT
Start: 2024-09-13 | End: 2025-06-10

## 2024-11-01 DIAGNOSIS — R10.10 UPPER ABDOMINAL PAIN: ICD-10-CM

## 2024-11-01 DIAGNOSIS — K21.9 GASTROESOPHAGEAL REFLUX DISEASE, UNSPECIFIED WHETHER ESOPHAGITIS PRESENT: ICD-10-CM

## 2024-11-01 DIAGNOSIS — R12 HEARTBURN: ICD-10-CM

## 2024-11-01 DIAGNOSIS — R10.10 PAIN OF UPPER ABDOMEN: ICD-10-CM

## 2024-11-01 RX ORDER — FAMOTIDINE 40 MG/1
40 TABLET, FILM COATED ORAL NIGHTLY PRN
Qty: 90 TABLET | Refills: 1 | Status: SHIPPED | OUTPATIENT
Start: 2024-11-01 | End: 2025-10-27

## 2024-11-01 RX ORDER — OMEPRAZOLE 40 MG/1
40 CAPSULE, DELAYED RELEASE ORAL EVERY MORNING
Qty: 90 CAPSULE | Refills: 1 | Status: SHIPPED | OUTPATIENT
Start: 2024-11-01 | End: 2025-07-29

## 2025-01-08 DIAGNOSIS — R10.10 PAIN OF UPPER ABDOMEN: ICD-10-CM

## 2025-01-08 DIAGNOSIS — R12 HEARTBURN: ICD-10-CM

## 2025-01-08 DIAGNOSIS — K21.9 GASTROESOPHAGEAL REFLUX DISEASE, UNSPECIFIED WHETHER ESOPHAGITIS PRESENT: ICD-10-CM

## 2025-01-08 DIAGNOSIS — R10.10 UPPER ABDOMINAL PAIN: ICD-10-CM

## 2025-01-09 RX ORDER — FAMOTIDINE 40 MG/1
40 TABLET, FILM COATED ORAL NIGHTLY PRN
Qty: 90 TABLET | Refills: 1 | Status: SHIPPED | OUTPATIENT
Start: 2025-01-09 | End: 2026-01-04

## 2025-01-09 RX ORDER — OMEPRAZOLE 40 MG/1
40 CAPSULE, DELAYED RELEASE ORAL EVERY MORNING
Qty: 90 CAPSULE | Refills: 1 | Status: SHIPPED | OUTPATIENT
Start: 2025-01-09

## 2025-01-13 ENCOUNTER — TELEPHONE (OUTPATIENT)
Dept: FAMILY MEDICINE | Facility: CLINIC | Age: 30
End: 2025-01-13
Payer: COMMERCIAL

## 2025-02-18 NOTE — PROGRESS NOTES
Subjective:       Patient ID: Chester Red Jr. is a 29 y.o. male Body mass index is 37.08 kg/m².    Chief Complaint: Abdominal Pain (Dizziness, for 2 days. Pt states it happens a few times and lasts for about an hour. Pt states it happens more with head changes. Constipation for about 6 months. Abdominal Pains.)    This patient is new to me.  Referring Provider: No ref. provider found for constipation.  Established patient of LEDA Mohan NP     Pt reports pain in upper to middle abdominal region. He reports constipation but with liquid watery stool this AM before visit.      Review of Systems   Constitutional:  Negative for activity change, appetite change, fatigue, fever and unexpected weight change.   HENT:  Negative for sore throat and trouble swallowing.    Respiratory:  Negative for cough and shortness of breath.    Cardiovascular:  Negative for chest pain.   Gastrointestinal:  Positive for abdominal pain, constipation and diarrhea. Negative for abdominal distention, anal bleeding, blood in stool, nausea, rectal pain and vomiting.       No LMP for male patient.  Past Medical History:   Diagnosis Date    Acid reflux     Gall stones     Hypertension      Past Surgical History:   Procedure Laterality Date    ACHILLES TENDON SURGERY      COLONOSCOPY N/A 3/22/2024    Procedure: COLONOSCOPY;  Surgeon: Sharad Garcia MD;  Location: [facility];  Service: Endoscopy;  Laterality: N/A;    ESOPHAGOGASTRODUODENOSCOPY N/A 08/01/2022    Procedure: EGD (ESOPHAGOGASTRODUODENOSCOPY);  Surgeon: Bubba Lu MD;  Location: [facility];  Service: Endoscopy;  Laterality: N/A;    UPPER GASTROINTESTINAL ENDOSCOPY       Family History   Problem Relation Name Age of Onset    Prostate cancer Paternal Grandfather      Colon cancer Neg Hx      Colon polyps Neg Hx      Esophageal cancer Neg Hx      Stomach cancer Neg Hx       Social History[1]  Wt Readings from Last 10 Encounters:   02/19/25 120.6 kg (265 lb 14 oz)   06/12/24 114.7  kg (252 lb 13.9 oz)   04/17/24 112.5 kg (248 lb 0.3 oz)   04/09/24 112.4 kg (247 lb 12.8 oz)   03/22/24 111.1 kg (245 lb)   03/06/24 111.5 kg (245 lb 13 oz)   02/28/24 113.4 kg (250 lb)   02/14/24 115.2 kg (254 lb)   02/07/24 115.1 kg (253 lb 12 oz)   12/01/23 115.4 kg (254 lb 6.6 oz)     Lab Results   Component Value Date    WBC 5.80 04/17/2024    HGB 13.8 (L) 04/17/2024    HCT 43.3 04/17/2024    MCV 85 04/17/2024     04/17/2024     CMP  Sodium   Date Value Ref Range Status   04/17/2024 140 136 - 145 mmol/L Final     Potassium   Date Value Ref Range Status   04/17/2024 4.3 3.5 - 5.1 mmol/L Final     Chloride   Date Value Ref Range Status   04/17/2024 105 95 - 110 mmol/L Final     CO2   Date Value Ref Range Status   04/17/2024 27 23 - 29 mmol/L Final     Glucose   Date Value Ref Range Status   04/17/2024 70 70 - 110 mg/dL Final     BUN   Date Value Ref Range Status   04/17/2024 12 6 - 20 mg/dL Final     Creatinine   Date Value Ref Range Status   04/17/2024 1.1 0.5 - 1.4 mg/dL Final     Calcium   Date Value Ref Range Status   04/17/2024 9.7 8.7 - 10.5 mg/dL Final     Total Protein   Date Value Ref Range Status   04/17/2024 7.2 6.0 - 8.4 g/dL Final     Albumin   Date Value Ref Range Status   04/17/2024 3.8 3.5 - 5.2 g/dL Final     Total Bilirubin   Date Value Ref Range Status   04/17/2024 0.5 0.1 - 1.0 mg/dL Final     Comment:     For infants and newborns, interpretation of results should be based  on gestational age, weight and in agreement with clinical  observations.    Premature Infant recommended reference ranges:  Up to 24 hours.............<8.0 mg/dL  Up to 48 hours............<12.0 mg/dL  3-5 days..................<15.0 mg/dL  6-29 days.................<15.0 mg/dL       Alkaline Phosphatase   Date Value Ref Range Status   04/17/2024 58 55 - 135 U/L Final     AST   Date Value Ref Range Status   04/17/2024 28 10 - 40 U/L Final     ALT   Date Value Ref Range Status   04/17/2024 26 10 - 44 U/L Final  "    Anion Gap   Date Value Ref Range Status   04/17/2024 8 8 - 16 mmol/L Final     eGFR if    Date Value Ref Range Status   03/25/2022 >60.0 >60 mL/min/1.73 m^2 Final     eGFR if non    Date Value Ref Range Status   03/25/2022 >60.0 >60 mL/min/1.73 m^2 Final     Comment:     Calculation used to obtain the estimated glomerular filtration  rate (eGFR) is the CKD-EPI equation.        No results found for: "AMYLASE"  Lab Results   Component Value Date    LIPASE 43 11/17/2023     No results found for: "LIPASERES"  Lab Results   Component Value Date    TSH 1.580 11/17/2022       Reviewed prior medical records including radiology report of NICOLAS Mohan NP 6/2024 & endoscopy history (see surgical history).    Objective:      Physical Exam  Constitutional:       General: He is not in acute distress.     Appearance: He is obese. He is not ill-appearing.   HENT:      Head: Normocephalic and atraumatic.      Mouth/Throat:      Mouth: Mucous membranes are moist.      Pharynx: Oropharynx is clear.   Eyes:      Conjunctiva/sclera: Conjunctivae normal.   Cardiovascular:      Rate and Rhythm: Normal rate and regular rhythm.      Pulses: Normal pulses.   Pulmonary:      Effort: Pulmonary effort is normal. No respiratory distress.   Abdominal:      General: Abdomen is flat. Bowel sounds are normal. There is no distension.      Palpations: Abdomen is soft.      Tenderness: There is abdominal tenderness.   Skin:     General: Skin is warm and dry.      Capillary Refill: Capillary refill takes 2 to 3 seconds.   Neurological:      Mental Status: He is alert and oriented to person, place, and time.         Assessment:       1. Dizziness    2. Heartburn    3. Gastroesophageal reflux disease, unspecified whether esophagitis present    4. Pain of upper abdomen    5. Upper abdominal pain    6. Constipation, unspecified constipation type        Plan:       Dizziness  Reports intermittent dizziness - will rule out " anemia and electrolyte abnormalities with labs. Plan depending on results of these studies  -     CBC Auto Differential; Standing  -     Comprehensive Metabolic Panel; Future; Expected date: 02/19/2025  -     Magnesium; Future; Expected date: 02/19/2025  -     Vitamin B12; Future; Expected date: 02/19/2025  -     IRON AND TIBC; Future; Expected date: 02/19/2025  -     Ferritin; Future; Expected date: 02/19/2025    Gastroesophageal reflux disease, unspecified whether esophagitis present, Heartburn, Pain of upper abdomen  -discussed about the different types of medications used to treat reflux and how to use them, antacids can be used PRN for breakthrough heartburn symptoms by reducing stomach acid that is already produced, H2 blockers work by limiting the amount acid production, & PPI's work to block acid production and are taken daily, patient verbalized understanding.  -Educated patient on lifestyle modifications to help control/reduce reflux/abdominal pain including: avoid large meals, avoid eating within 2-3 hours of bedtime (avoid late night eating & lying down soon after eating), elevate head of bed if nocturnal symptoms are present, smoking cessation (if current smoker), & weight loss (if overweight).   -Educated to avoid known foods which trigger reflux symptoms & to minimize/avoid high-fat foods, chocolate, caffeine, citrus, alcohol, & tomato products.  -Advised to avoid/limit use of NSAID's, since they can cause GI upset, bleeding, and/or ulcers. If needed, take with food.     Continue omeprazole and pepcid as ordered  -     omeprazole (PRILOSEC) 40 MG capsule; Take 1 capsule (40 mg total) by mouth every morning.  Dispense: 90 capsule; Refill: 3  -     omeprazole (PRILOSEC) 40 MG capsule; Take 1 capsule (40 mg total) by mouth every morning.  Dispense: 90 capsule; Refill: 3  -     X-Ray Abdomen AP 1 View; Future; Expected date: 02/19/2025      Constipation, unspecified constipation type  Recommend daily  exercise as tolerated, adequate water intake (six 8-oz glasses of water daily), and high fiber diet. OTC fiber supplements are recommended if diet does not reach daily fiber goal (20-30 grams daily), such as Metamucil, Citrucel, or FiberCon (take as directed, separate from other oral medications by >2 hours).  -Recommend taking an OTC stool softener such as Colace as directed to avoid hard stools and straining with bowel movements PRN  -Recommend trying OTC MiraLax once daily (17g PO) as directed  - If no improvement with above recommendations, try intermittently dosed Dulcolax OTC as directed (every 3-4  days) PRN to facilitate bowel movements  -If still no improvement with these measures, call/follow-up    X-ray to rule out obstructive process and assess stool burden  -     X-Ray Abdomen AP 1 View; Future; Expected date: 02/19/2025      Follow up if symptoms worsen or fail to improve.      If no improvement in symptoms or symptoms worsen, call/follow-up at clinic or go to ER.       KARLEE Tellez, BRYN    Encounter includes face to face time and non-face to face time preparing to see the patient (eg, review of tests), obtaining and/or reviewing separately obtained history, documenting clinical information in the electronic or other health record, independently interpreting results (not separately reported) and communicating results to the patient/family/caregiver, or care coordination (not separately reported).     A dictation software program was used for this note. Please expect some simple typographical errors in this note.         [1]   Social History  Tobacco Use    Smoking status: Every Day     Types: Vaping with nicotine    Smokeless tobacco: Never    Tobacco comments:     Cigar   Substance Use Topics    Alcohol use: Not Currently     Comment: stopped a few months ago    Drug use: No

## 2025-02-19 ENCOUNTER — HOSPITAL ENCOUNTER (OUTPATIENT)
Dept: RADIOLOGY | Facility: HOSPITAL | Age: 30
Discharge: HOME | End: 2025-02-19
Payer: COMMERCIAL

## 2025-02-19 ENCOUNTER — RESULTS FOLLOW-UP (OUTPATIENT)
Dept: GASTROENTEROLOGY | Facility: CLINIC | Age: 30
End: 2025-02-19

## 2025-02-19 ENCOUNTER — OFFICE VISIT (OUTPATIENT)
Dept: GASTROENTEROLOGY | Facility: CLINIC | Age: 30
End: 2025-02-19
Payer: COMMERCIAL

## 2025-02-19 VITALS
HEIGHT: 71 IN | WEIGHT: 265.88 LBS | HEART RATE: 65 BPM | DIASTOLIC BLOOD PRESSURE: 85 MMHG | SYSTOLIC BLOOD PRESSURE: 128 MMHG | BODY MASS INDEX: 37.22 KG/M2

## 2025-02-19 DIAGNOSIS — R12 HEARTBURN: ICD-10-CM

## 2025-02-19 DIAGNOSIS — R10.10 PAIN OF UPPER ABDOMEN: ICD-10-CM

## 2025-02-19 DIAGNOSIS — K21.9 GASTROESOPHAGEAL REFLUX DISEASE, UNSPECIFIED WHETHER ESOPHAGITIS PRESENT: ICD-10-CM

## 2025-02-19 DIAGNOSIS — R42 DIZZINESS: Primary | ICD-10-CM

## 2025-02-19 DIAGNOSIS — K59.00 CONSTIPATION, UNSPECIFIED CONSTIPATION TYPE: ICD-10-CM

## 2025-02-19 DIAGNOSIS — R10.10 UPPER ABDOMINAL PAIN: ICD-10-CM

## 2025-02-19 PROCEDURE — 99999 PR PBB SHADOW E&M-EST. PATIENT-LVL IV: CPT | Mod: PBBFAC,,,

## 2025-02-19 PROCEDURE — 74018 RADEX ABDOMEN 1 VIEW: CPT | Mod: TC

## 2025-02-19 RX ORDER — FAMOTIDINE 40 MG/1
40 TABLET, FILM COATED ORAL NIGHTLY PRN
Qty: 90 TABLET | Refills: 3 | Status: SHIPPED | OUTPATIENT
Start: 2025-02-19 | End: 2026-02-14

## 2025-02-19 RX ORDER — OMEPRAZOLE 40 MG/1
40 CAPSULE, DELAYED RELEASE ORAL EVERY MORNING
Qty: 90 CAPSULE | Refills: 3 | Status: SHIPPED | OUTPATIENT
Start: 2025-02-19

## 2025-02-19 NOTE — PATIENT INSTRUCTIONS
Recommend daily exercise as tolerated, adequate water intake (six 8-oz glasses of water daily), and high fiber diet. OTC fiber supplements are recommended if diet does not reach daily fiber goal (20-30 grams daily), such as Metamucil, Citrucel, or FiberCon (take as directed, separate from other oral medications by >2 hours).  -Recommend taking an OTC stool softener such as Colace as directed to avoid hard stools and straining with bowel movements PRN  -Recommend trying OTC MiraLax once daily (17g PO) as directed  - If no improvement with above recommendations, try intermittently dosed Dulcolax OTC as directed (every 3-4 days) PRN to facilitate bowel movements  -If still no improvement with these measures, call/follow-up

## 2025-03-19 ENCOUNTER — E-VISIT (OUTPATIENT)
Dept: CARDIOLOGY | Facility: CLINIC | Age: 30
End: 2025-03-19
Payer: COMMERCIAL

## 2025-03-19 DIAGNOSIS — R55 PRE-SYNCOPE: ICD-10-CM

## 2025-03-19 DIAGNOSIS — R42 DIZZINESS: Primary | ICD-10-CM

## 2025-03-19 NOTE — PROGRESS NOTES
Patient ID: Chester Red Jr. is a 29 y.o. male.    Chief Complaint: General Illness (Entered automatically based on patient selection in [employer].)    The patient initiated a request through [employer] on 3/19/2025 for evaluation and management with a chief complaint of General Illness (Entered automatically based on patient selection in [employer].)     I evaluated the questionnaire submission on unsure of the problem that is causing your dizziness and lightheadedness from a cardiac standpoint.  Stomach pains are usually not representative of a heart problem.  Neither are headaches.  If you are having nasal problems, that could potentially explain your dizziness and headache at times.  I would recommend reaching out to your primary care physician for the same.  From a cardiac standpoint, I will order a monitor to evaluate to make sure you not having any heart arrhythmias that can cause you to be dizzy and lightheaded and make you pass out.  I will also order a tilt-table test and an echocardiogram for the evaluation of the same.    [facility]    3/19/2025  8:07 AM CDT - Filed by Patient   What do you need help with? Other Concern   Do you agree to participate in an E-Visit? Yes   If you have any of the following symptoms, please go to the nearest emergency room or call 911: I acknowledge   What is the main issue you would like addressed today? for the pass 3 days i have been extremely lightheaded and dizzy along with stomach pains i am not sure whats going on but i tried to gove it time go past but im still qaking up like this   Please describe your symptoms. nose stopped up, headache on in off back of the head and waking up tired and dizzy along with stomach pains   Where is your problem located? head   On a scale of 1-10, where 10 is the worst you can imagine, how severe are your symptoms? (range: 1 - 10) 9   Have you had these symptoms before? No   How long have you had these symptoms? A  few days   What helps with your symptoms? hot baths been easing it   What makes your symptoms feel worse? waking up its ongoing   Are your symptoms related to a condition you currently have? Not sure   Please describe any probable cause for your symptoms. no sure   Provide any additional information you feel is important. n/a   Please attach any relevant images or files    Are you able to take your vital signs? No         No diagnosis found.     No orders of the defined types were placed in this encounter.           No follow-ups on file.      E-Visit Time Tracking:    Day 1 Time (in minutes): 6    Total Time (in minutes): 6

## 2025-03-26 ENCOUNTER — TELEPHONE (OUTPATIENT)
Dept: GASTROENTEROLOGY | Facility: CLINIC | Age: 30
End: 2025-03-26
Payer: COMMERCIAL

## 2025-03-26 NOTE — TELEPHONE ENCOUNTER
Patient complaining of flare up having abdominal pain and colon spasms he would like to come back and see you next week. Office or please advise?

## 2025-03-26 NOTE — TELEPHONE ENCOUNTER
----- Message from Debby sent at 3/26/2025 12:12 PM CDT -----  Contact: self  Type:  Needs Medical AdviceWho Called: pt Symptoms (please be specific):  How long has patient had these symptoms:  Pharmacy name and phone #:  Would the patient rather a call back or a response via [facility]? [facility]Best Call Back Number: [phone] Information: pt states he needs office to give him a call back   Please call back to advise. Thanks!

## 2025-03-26 NOTE — TELEPHONE ENCOUNTER
----- Message from Debby sent at 3/26/2025 12:12 PM CDT -----  Contact: self  Type:  Needs Medical AdviceWho Called: pt Symptoms (please be specific):  How long has patient had these symptoms:  Pharmacy name and phone #:  Would the patient rather a call back or a response via [facility]? [facility] Call Back Number: [phone] Information: pt states he needs office to give him a call back   Please call back to advise. Thanks!

## 2025-04-02 ENCOUNTER — HOSPITAL ENCOUNTER (OUTPATIENT)
Dept: CARDIOLOGY | Facility: CLINIC | Age: 30
Discharge: HOME | End: 2025-04-02
Attending: INTERNAL MEDICINE
Payer: COMMERCIAL

## 2025-04-02 ENCOUNTER — HOSPITAL ENCOUNTER (OUTPATIENT)
Dept: CARDIOLOGY | Facility: HOSPITAL | Age: 30
Discharge: HOME | End: 2025-04-02
Attending: INTERNAL MEDICINE
Payer: COMMERCIAL

## 2025-04-02 VITALS — WEIGHT: 269 LBS | HEIGHT: 71 IN | BODY MASS INDEX: 37.66 KG/M2

## 2025-04-02 DIAGNOSIS — R55 PRE-SYNCOPE: ICD-10-CM

## 2025-04-02 DIAGNOSIS — R42 DIZZINESS: ICD-10-CM

## 2025-04-02 PROCEDURE — 93306 TTE W/DOPPLER COMPLETE: CPT

## 2025-04-02 PROCEDURE — 93306 TTE W/DOPPLER COMPLETE: CPT | Mod: 26,,, | Performed by: INTERNAL MEDICINE

## 2025-04-03 ENCOUNTER — TELEPHONE (OUTPATIENT)
Dept: CARDIOLOGY | Facility: CLINIC | Age: 30
End: 2025-04-03
Payer: COMMERCIAL

## 2025-04-03 LAB
AORTIC ROOT ANNULUS: 3.8 CM
AORTIC VALVE CUSP SEPERATION: 2.1 CM
APICAL FOUR CHAMBER EJECTION FRACTION: 66 %
AV INDEX (PROSTH): 0.75
AV MEAN GRADIENT: 5 MMHG
AV PEAK GRADIENT: 10 MMHG
AV VALVE AREA BY VELOCITY RATIO: 2.4 CM²
AV VALVE AREA: 2.6 CM²
AV VELOCITY RATIO: 0.69
BSA FOR ECHO PROCEDURE: 2.47 M2
CV ECHO LV RWT: 0.54 CM
DOP CALC AO PEAK VEL: 1.6 M/S
DOP CALC AO VTI: 28.3 CM
DOP CALC LVOT AREA: 3.5 CM2
DOP CALC LVOT DIAMETER: 2.1 CM
DOP CALC LVOT PEAK VEL: 1.1 M/S
DOP CALC LVOT STROKE VOLUME: 73.7 CM3
DOP CALCLVOT PEAK VEL VTI: 21.3 CM
E WAVE DECELERATION TIME: 243 MSEC
E/A RATIO: 1.69
E/E' RATIO: 10 M/S
ECHO LV POSTERIOR WALL: 1.4 CM (ref 0.6–1.1)
FRACTIONAL SHORTENING: 40.4 % (ref 28–44)
INTERVENTRICULAR SEPTUM: 1.3 CM (ref 0.6–1.1)
IVRT: 100 MSEC
LEFT ATRIUM SIZE: 3.3 CM
LEFT INTERNAL DIMENSION IN SYSTOLE: 3.1 CM (ref 2.1–4)
LEFT VENTRICLE DIASTOLIC VOLUME INDEX: 54.81 ML/M2
LEFT VENTRICLE DIASTOLIC VOLUME: 131 ML
LEFT VENTRICLE END DIASTOLIC VOLUME APICAL 4 CHAMBER: 122 ML
LEFT VENTRICLE MASS INDEX: 122.9 G/M2
LEFT VENTRICLE SYSTOLIC VOLUME INDEX: 16.3 ML/M2
LEFT VENTRICLE SYSTOLIC VOLUME: 39 ML
LEFT VENTRICULAR INTERNAL DIMENSION IN DIASTOLE: 5.2 CM (ref 3.5–6)
LEFT VENTRICULAR MASS: 293.8 G
LV LATERAL E/E' RATIO: 8.1 M/S
LV SEPTAL E/E' RATIO: 11.6 M/S
LVED V (TEICH): 131 ML
LVES V (TEICH): 39.1 ML
LVOT MG: 2 MMHG
LVOT MV: 0.68 CM/S
MV PEAK A VEL: 0.48 M/S
MV PEAK E VEL: 0.81 M/S
MV STENOSIS PRESSURE HALF TIME: 45 MS
MV VALVE AREA P 1/2 METHOD: 4.89 CM2
OHS CV RV/LV RATIO: 0.58 CM
PV MV: 0.74 M/S
PV PEAK GRADIENT: 4 MMHG
PV PEAK VELOCITY: 1.04 M/S
RA PRESSURE ESTIMATED: 3 MMHG
RIGHT VENTRICLE DIASTOLIC BASEL DIMENSION: 3 CM
RIGHT VENTRICULAR END-DIASTOLIC DIMENSION: 3.01 CM
TDI LATERAL: 0.1 M/S
TDI SEPTAL: 0.07 M/S
TDI: 0.09 M/S
Z-SCORE OF LEFT VENTRICULAR DIMENSION IN END DIASTOLE: -7.21
Z-SCORE OF LEFT VENTRICULAR DIMENSION IN END SYSTOLE: -5.74

## 2025-04-03 NOTE — TELEPHONE ENCOUNTER
----- Message from Jacky sent at 4/3/2025 11:33 AM CDT -----  Regarding: results  Type:  Needs Medical AdviceWho Called: Jacob Call Back Number: [phone] Information: pt wants a callback to go over his results.  please call to discuss.

## 2025-04-04 ENCOUNTER — PATIENT MESSAGE (OUTPATIENT)
Dept: CARDIOLOGY | Facility: CLINIC | Age: 30
End: 2025-04-04
Payer: COMMERCIAL

## 2025-04-04 ENCOUNTER — PATIENT MESSAGE (OUTPATIENT)
Dept: FAMILY MEDICINE | Facility: CLINIC | Age: 30
End: 2025-04-04
Payer: COMMERCIAL

## 2025-04-04 NOTE — TELEPHONE ENCOUNTER
Spoke with patient notified him his last labs from our office was 04/17/24. States he was referring to his Echo. Will reach out to Dr. Mcleod office.

## 2025-04-07 ENCOUNTER — PATIENT MESSAGE (OUTPATIENT)
Dept: CARDIOLOGY | Facility: CLINIC | Age: 30
End: 2025-04-07
Payer: COMMERCIAL

## 2025-04-08 ENCOUNTER — TELEPHONE (OUTPATIENT)
Dept: CARDIOLOGY | Facility: CLINIC | Age: 30
End: 2025-04-08
Payer: COMMERCIAL

## 2025-04-08 NOTE — TELEPHONE ENCOUNTER
----- Message from Belle sent at 4/8/2025  3:52 PM CDT -----  Type:  Test ResultsWho Called: ptName of Test (Lab/Mammo/Etc): TRANSTHORACIC ECHO COMPLETEDate of Test: 4/2Ordering Provider: gargWhere the test was performed: [facility]. Would the patient rather a call back or a response via [facility]? Call [facility] Call Back Number: [phone] Additional Information:  pt st he's been reaching out regarding his results. pt st nobody has contacted him. he dont understand his results & is requesting someone give him a call. please call to discuss

## 2025-04-10 ENCOUNTER — TELEPHONE (OUTPATIENT)
Dept: CARDIOLOGY | Facility: CLINIC | Age: 30
End: 2025-04-10
Payer: COMMERCIAL

## 2025-04-10 NOTE — TELEPHONE ENCOUNTER
----- Message from Evette sent at 4/10/2025  3:50 PM CDT -----  Type:  Test ResultsWho Called: JAJA RAJAN JR. [[id number]]Name of Test (Lab/Mammo/Etc): holtor and ECHODate of Test: 04/02Ordering Provider: john Where the test was performed: [facility] Would the patient rather a call back or a response via [facility]? Call back Best Call Back Number: Additional Information:  patient states he has been trying to get in touch with the provider for days. Patient states he has sent multiple messages and tried to peak with someone directly to get information on his test results. Patient was advised based on the providers schedule the provider is out of office until 04/14. Patient expressed understanding and would like to speak with someone about results as soon as possible.

## 2025-04-30 ENCOUNTER — OFFICE VISIT (OUTPATIENT)
Dept: UROLOGY | Facility: CLINIC | Age: 30
End: 2025-04-30
Payer: COMMERCIAL

## 2025-04-30 ENCOUNTER — TELEPHONE (OUTPATIENT)
Dept: UROLOGY | Facility: CLINIC | Age: 30
End: 2025-04-30

## 2025-04-30 VITALS
HEART RATE: 73 BPM | BODY MASS INDEX: 37.65 KG/M2 | DIASTOLIC BLOOD PRESSURE: 85 MMHG | WEIGHT: 268.94 LBS | SYSTOLIC BLOOD PRESSURE: 134 MMHG | HEIGHT: 71 IN

## 2025-04-30 DIAGNOSIS — N48.1 BALANITIS: ICD-10-CM

## 2025-04-30 DIAGNOSIS — N47.8 REDUNDANT FORESKIN: Primary | ICD-10-CM

## 2025-04-30 DIAGNOSIS — N48.1 BALANITIS: Primary | ICD-10-CM

## 2025-04-30 PROCEDURE — 3008F BODY MASS INDEX DOCD: CPT | Mod: CPTII,S$GLB,, | Performed by: STUDENT IN AN ORGANIZED HEALTH CARE EDUCATION/TRAINING PROGRAM

## 2025-04-30 PROCEDURE — 1159F MED LIST DOCD IN RCRD: CPT | Mod: CPTII,S$GLB,, | Performed by: STUDENT IN AN ORGANIZED HEALTH CARE EDUCATION/TRAINING PROGRAM

## 2025-04-30 PROCEDURE — 99999 PR PBB SHADOW E&M-EST. PATIENT-LVL III: CPT | Mod: PBBFAC,,, | Performed by: STUDENT IN AN ORGANIZED HEALTH CARE EDUCATION/TRAINING PROGRAM

## 2025-04-30 PROCEDURE — 99204 OFFICE O/P NEW MOD 45 MIN: CPT | Mod: S$GLB,,, | Performed by: STUDENT IN AN ORGANIZED HEALTH CARE EDUCATION/TRAINING PROGRAM

## 2025-04-30 PROCEDURE — 3079F DIAST BP 80-89 MM HG: CPT | Mod: CPTII,S$GLB,, | Performed by: STUDENT IN AN ORGANIZED HEALTH CARE EDUCATION/TRAINING PROGRAM

## 2025-04-30 PROCEDURE — 3075F SYST BP GE 130 - 139MM HG: CPT | Mod: CPTII,S$GLB,, | Performed by: STUDENT IN AN ORGANIZED HEALTH CARE EDUCATION/TRAINING PROGRAM

## 2025-04-30 RX ORDER — CLOTRIMAZOLE 1 %
CREAM (GRAM) TOPICAL 2 TIMES DAILY
Qty: 60 G | Refills: 0 | Status: SHIPPED | OUTPATIENT
Start: 2025-04-30 | End: 2025-05-30

## 2025-04-30 NOTE — PROGRESS NOTES
It was great to see Chester today.    Chester Red Jr. is a pleasant 30 y.o. male presenting for management of redundant foreskin.     History of Present Illness     - Patient, a 30-year-old male, reports a 9-year history of penile issues. He initially sought medical attention in September 2016 for difficulty retracting his foreskin and small tears, diagnosed as phimosis at that time. A circumcision was planned but cancelled. He did not follow up due to apprehension and academic commitments.  - He describes ongoing difficulties with cleaning his penis and maintaining proper hygiene. He expresses concern about potentially disrupting the pH balance during intercourse with his wife. He mentions tears in his foreskin, which he attributes to possible dryness. He reports days when he has difficulty retracting his foreskin, although this was not observed during the current exam.  - He expresses a desire for circumcision, citing both functional concerns and self-consciousness as motivating factors. He indicates this has been a long-standing consideration.   - He denies having diabetes, heart attack, or stroke.    SOCIAL HISTORY:  - Vapes  - Occupation:     Marital status:              Past Medical History:   Diagnosis Date    Acid reflux     Gall stones     Hypertension       Past Surgical History:   Procedure Laterality Date    ACHILLES TENDON SURGERY      COLONOSCOPY N/A 3/22/2024    Procedure: COLONOSCOPY;  Surgeon: Sharad Garcia MD;  Location: [facility];  Service: Endoscopy;  Laterality: N/A;    ESOPHAGOGASTRODUODENOSCOPY N/A 08/01/2022    Procedure: EGD (ESOPHAGOGASTRODUODENOSCOPY);  Surgeon: Bubba Lu MD;  Location: [facility];  Service: Endoscopy;  Laterality: N/A;    UPPER GASTROINTESTINAL ENDOSCOPY         ROS: as per HPI    Tobacco Use History[1]     Physical Exam     General: No acute distress. Nontoxic appearing.  HENT: Normocephalic. Atraumatic.  Respiratory: Normal respiratory  "effort. No conversational dyspnea. No audible wheezing.  Abdomen: No obvious distension.  Skin: No visible abnormalities.  Extremities: No edema upper extremities. No edema lower extremities.  Neurological: Alert and oriented x3. Normal speech.  Psychiatric: Normal mood. Normal affect. No evidence of SI.   : Uncircumcised phallus. No phimosis, foreskin retracts easily. No palpable plaques. Patent meatus without discharge. There is very mild fungal balanitis. B/L testicles in scrotum, without masses or tenderness.       Data review  Prior internal/external notes have been reviewed: Yes  Care Everywhere reviewed for external records:  Yes    Pertinent labs and imaging independently reviewed include:   Lab Results   Component Value Date     02/19/2025    K 4.1 02/19/2025    CREATININE 1.2 02/19/2025    EGFRNORACEVR >60 02/19/2025     Lab Results   Component Value Date    HGBA1C 5.7 (H) 04/17/2024      No results found for: "PSA", "PSALABCORP"         Problems addressed during today's encounter  1. Redundant foreskin    2. Balanitis    Other orders  -     clotrimazole (LOTRIMIN) 1 % cream; Apply topically 2 (two) times daily.  Dispense: 60 g; Refill: 0         Plan for problems listed above includes:   Assessment & Plan    FUNGAL INFECTION (OTHER UROGENITAL CANDIDIASIS):   Mild fungal infection suspected.   Started antifungal cream for mild fungal infection; use for 1 month.   Patient to keep penis dry after cleaning to prevent fungal infections.   Patient to pull back foreskin and dry the area once or twice daily.   Explained balanitis and its potential to cause foreskin scarring if recurrent.    CIRCUMCISION (PERSONAL HISTORY OF OTHER DISEASES OF MALE GENITAL ORGANS):   We discussed that with a retractable foreskin, circumcision is not necessary/ He still feels like it inhibits his cleanliness and requests a circumcision.     Circumcision procedure to be scheduled.     NICOTINE DEPENDENCE:   Recommend " cessation of vaping 6 weeks before and after surgery to improve healing.      The patient has been counseled regarding options for phimosis (including but not limited to observation, dorsal slit, circumcision) and the risks/benefits of these options.  Specifically regarding circumcision, it has been explained that surgery carries risks of pain, bleeding, infection, scarring, altered sensation (either increased or decreased), injury to nearby structures, need for subsequent surgery, redundant penile skin, insufficient penile skin, patient dissatisfaction with cosmesis and decreased erectile function. We reviewed the expected postoperative course including the level of postoperative pain, options for management of postoperative pain, postoperative limitations, and postoperative wound care.    Risk for nontreatment of mentioned condition(s) includes, but is not limited to:   Continuation or worsening of the current condition(s)  Infection/sepsis  Impaired sexual function    Further evaluation ordered today:   None    This note was generated with the assistance of ambient listening technology. Verbal consent was obtained by the patient and accompanying visitor(s) for the recording of patient appointment to facilitate this note. I attest to having reviewed and edited the generated note for accuracy, though some syntax or spelling errors may persist. Please contact the author of this note for any clarification.     Bill New MD           [1]   Social History  Tobacco Use   Smoking Status Every Day    Types: Vaping with nicotine   Smokeless Tobacco Never   Tobacco Comments    Cigar

## 2025-04-30 NOTE — H&P (VIEW-ONLY)
It was great to see Chester today.    Chester Red Jr. is a pleasant 30 y.o. male presenting for management of redundant foreskin.     History of Present Illness     - Patient, a 30-year-old male, reports a 9-year history of penile issues. He initially sought medical attention in September 2016 for difficulty retracting his foreskin and small tears, diagnosed as phimosis at that time. A circumcision was planned but cancelled. He did not follow up due to apprehension and academic commitments.  - He describes ongoing difficulties with cleaning his penis and maintaining proper hygiene. He expresses concern about potentially disrupting the pH balance during intercourse with his wife. He mentions tears in his foreskin, which he attributes to possible dryness. He reports days when he has difficulty retracting his foreskin, although this was not observed during the current exam.  - He expresses a desire for circumcision, citing both functional concerns and self-consciousness as motivating factors. He indicates this has been a long-standing consideration.   - He denies having diabetes, heart attack, or stroke.    SOCIAL HISTORY:  - Vapes  - Occupation:     Marital status:              Past Medical History:   Diagnosis Date    Acid reflux     Gall stones     Hypertension       Past Surgical History:   Procedure Laterality Date    ACHILLES TENDON SURGERY      COLONOSCOPY N/A 3/22/2024    Procedure: COLONOSCOPY;  Surgeon: Sharad Garcia MD;  Location: [facility];  Service: Endoscopy;  Laterality: N/A;    ESOPHAGOGASTRODUODENOSCOPY N/A 08/01/2022    Procedure: EGD (ESOPHAGOGASTRODUODENOSCOPY);  Surgeon: Bubba Lu MD;  Location: [facility];  Service: Endoscopy;  Laterality: N/A;    UPPER GASTROINTESTINAL ENDOSCOPY         ROS: as per HPI    Tobacco Use History[1]     Physical Exam     General: No acute distress. Nontoxic appearing.  HENT: Normocephalic. Atraumatic.  Respiratory: Normal respiratory  "effort. No conversational dyspnea. No audible wheezing.  Abdomen: No obvious distension.  Skin: No visible abnormalities.  Extremities: No edema upper extremities. No edema lower extremities.  Neurological: Alert and oriented x3. Normal speech.  Psychiatric: Normal mood. Normal affect. No evidence of SI.   : Uncircumcised phallus. No phimosis, foreskin retracts easily. No palpable plaques. Patent meatus without discharge. There is very mild fungal balanitis. B/L testicles in scrotum, without masses or tenderness.       Data review  Prior internal/external notes have been reviewed: Yes  Care Everywhere reviewed for external records:  Yes    Pertinent labs and imaging independently reviewed include:   Lab Results   Component Value Date     02/19/2025    K 4.1 02/19/2025    CREATININE 1.2 02/19/2025    EGFRNORACEVR >60 02/19/2025     Lab Results   Component Value Date    HGBA1C 5.7 (H) 04/17/2024      No results found for: "PSA", "PSALABCORP"         Problems addressed during today's encounter  1. Redundant foreskin    2. Balanitis    Other orders  -     clotrimazole (LOTRIMIN) 1 % cream; Apply topically 2 (two) times daily.  Dispense: 60 g; Refill: 0         Plan for problems listed above includes:   Assessment & Plan    FUNGAL INFECTION (OTHER UROGENITAL CANDIDIASIS):   Mild fungal infection suspected.   Started antifungal cream for mild fungal infection; use for 1 month.   Patient to keep penis dry after cleaning to prevent fungal infections.   Patient to pull back foreskin and dry the area once or twice daily.   Explained balanitis and its potential to cause foreskin scarring if recurrent.    CIRCUMCISION (PERSONAL HISTORY OF OTHER DISEASES OF MALE GENITAL ORGANS):   We discussed that with a retractable foreskin, circumcision is not necessary/ He still feels like it inhibits his cleanliness and requests a circumcision.     Circumcision procedure to be scheduled.     NICOTINE DEPENDENCE:   Recommend " cessation of vaping 6 weeks before and after surgery to improve healing.      The patient has been counseled regarding options for phimosis (including but not limited to observation, dorsal slit, circumcision) and the risks/benefits of these options.  Specifically regarding circumcision, it has been explained that surgery carries risks of pain, bleeding, infection, scarring, altered sensation (either increased or decreased), injury to nearby structures, need for subsequent surgery, redundant penile skin, insufficient penile skin, patient dissatisfaction with cosmesis and decreased erectile function. We reviewed the expected postoperative course including the level of postoperative pain, options for management of postoperative pain, postoperative limitations, and postoperative wound care.    Risk for nontreatment of mentioned condition(s) includes, but is not limited to:   Continuation or worsening of the current condition(s)  Infection/sepsis  Impaired sexual function    Further evaluation ordered today:   None    This note was generated with the assistance of ambient listening technology. Verbal consent was obtained by the patient and accompanying visitor(s) for the recording of patient appointment to facilitate this note. I attest to having reviewed and edited the generated note for accuracy, though some syntax or spelling errors may persist. Please contact the author of this note for any clarification.     Bill New MD           [1]   Social History  Tobacco Use   Smoking Status Every Day    Types: Vaping with nicotine   Smokeless Tobacco Never   Tobacco Comments    Cigar

## 2025-04-30 NOTE — TELEPHONE ENCOUNTER
Spoke with patient in room with surgery date, 05/27/ reveiwed preop instructions . Verbalized understanding.

## 2025-05-07 NOTE — ANESTHESIA PAT ROS NOTE
05/07/2025  Chester Red Jr. is a 30 y.o., male.      Pre-op Assessment    I have reviewed the NPO Status.   I have reviewed the Medications.     Review of Systems  Anesthesia Hx:  No problems with previous Anesthesia   History of prior surgery of interest to airway management or planning:  Previous anesthesia: General 03/22/2024 Colonoscopy with general anesthesia.  Procedure performed at an [facility].       Denies Family Hx of Anesthesia complications.    Denies Personal Hx of Anesthesia complications.                    Social:  Vaping       Hematology/Oncology:    Oncology Normal    -- Denies Anemia:                                  EENT/Dental:  EENT/Dental Normal           Cardiovascular:  Exercise tolerance: good    Denies Hypertension.   Denies MI.        Denies Angina.       Denies CONTRERAS.                              Pulmonary:       Denies Shortness of breath.   Denies Sleep Apnea.                Renal/:  Renal/ Normal                 Hepatic/GI:     GERD                Musculoskeletal:  Musculoskeletal Normal                Neurological:    Denies CVA.    Denies Seizures.                                Endocrine:        Obesity / BMI > 30  Psych:  Psychiatric Normal                         Anesthesia Assessment: Preoperative EQUATION    Planned Procedure: Procedure(s) (LRB):  CIRCUMCISION (N/A)  Requested Anesthesia Type:General  Surgeon: Bill New MD  Service: Urology  Known or anticipated Date of Surgery:5/27/2025    Surgeon notes: reviewed    Electronic QUestionnaire Assessment completed via nurse interview with patient.        Triage considerations:     The patient has no apparent active cardiac condition (No unstable coronary Syndrome such as severe unstable angina or recent [<1 month] myocardial infarction, decompensated CHF, severe valvular   disease or significant  arrhythmia)    Previous anesthesia records:GETA and No problems  03/22/2024 Colonoscopy     Airway:  Mallampati: II   Mouth Opening: Normal  TM Distance: Normal  Neck ROM: Normal ROM    Last PCP note: > 1 year ago , within [facility]   Subspecialty notes: Gastroenterology, Urology    Other important co-morbidities: GERD, Obesity, and Vaping       Tests already available:  Available tests,  within 1 month , within 3 months , within [facility] .   04/02/2025 Echo EF 55-60%  02/19/2025 CBC, CMP             Instructions given. (See in Nurse's note)    Optimization:  Anesthesia Preop Clinic Assessment  Indicated phone screen         Plan:    Testing:  None Needed     Patient  has previously scheduled Medical Appointment: PCP 05/21/2025    Navigation:             Straight Line to surgery.               No tests, anesthesia preop clinic visit, or consult required.

## 2025-05-26 ENCOUNTER — TELEPHONE (OUTPATIENT)
Dept: UROLOGY | Facility: CLINIC | Age: 30
End: 2025-05-26
Payer: COMMERCIAL

## 2025-05-26 ENCOUNTER — ANESTHESIA EVENT (OUTPATIENT)
Dept: SURGERY | Facility: HOSPITAL | Age: 30
End: 2025-05-26
Payer: COMMERCIAL

## 2025-05-27 ENCOUNTER — HOSPITAL ENCOUNTER (OUTPATIENT)
Facility: HOSPITAL | Age: 30
Discharge: HOME | End: 2025-05-27
Attending: STUDENT IN AN ORGANIZED HEALTH CARE EDUCATION/TRAINING PROGRAM | Admitting: STUDENT IN AN ORGANIZED HEALTH CARE EDUCATION/TRAINING PROGRAM
Payer: COMMERCIAL

## 2025-05-27 ENCOUNTER — ANESTHESIA (OUTPATIENT)
Dept: SURGERY | Facility: HOSPITAL | Age: 30
End: 2025-05-27
Payer: COMMERCIAL

## 2025-05-27 VITALS
DIASTOLIC BLOOD PRESSURE: 89 MMHG | RESPIRATION RATE: 17 BRPM | OXYGEN SATURATION: 96 % | SYSTOLIC BLOOD PRESSURE: 140 MMHG | HEART RATE: 77 BPM | TEMPERATURE: 98 F

## 2025-05-27 DIAGNOSIS — N47.8 REDUNDANT FORESKIN: ICD-10-CM

## 2025-05-27 PROCEDURE — 71000015 HC POSTOP RECOV 1ST HR: Performed by: STUDENT IN AN ORGANIZED HEALTH CARE EDUCATION/TRAINING PROGRAM

## 2025-05-27 PROCEDURE — 37000009 HC ANESTHESIA EA ADD 15 MINS: Performed by: STUDENT IN AN ORGANIZED HEALTH CARE EDUCATION/TRAINING PROGRAM

## 2025-05-27 PROCEDURE — 63600175 PHARM REV CODE 636 W HCPCS: Performed by: STUDENT IN AN ORGANIZED HEALTH CARE EDUCATION/TRAINING PROGRAM

## 2025-05-27 PROCEDURE — 54161 CIRCUM 28 DAYS OR OLDER: CPT | Mod: ,,, | Performed by: STUDENT IN AN ORGANIZED HEALTH CARE EDUCATION/TRAINING PROGRAM

## 2025-05-27 PROCEDURE — 71000044 HC DOSC ROUTINE RECOVERY FIRST HOUR: Performed by: STUDENT IN AN ORGANIZED HEALTH CARE EDUCATION/TRAINING PROGRAM

## 2025-05-27 PROCEDURE — 36000706: Performed by: STUDENT IN AN ORGANIZED HEALTH CARE EDUCATION/TRAINING PROGRAM

## 2025-05-27 PROCEDURE — 25000003 PHARM REV CODE 250: Performed by: STUDENT IN AN ORGANIZED HEALTH CARE EDUCATION/TRAINING PROGRAM

## 2025-05-27 PROCEDURE — 36000707: Performed by: STUDENT IN AN ORGANIZED HEALTH CARE EDUCATION/TRAINING PROGRAM

## 2025-05-27 PROCEDURE — 63600175 PHARM REV CODE 636 W HCPCS

## 2025-05-27 PROCEDURE — 37000008 HC ANESTHESIA 1ST 15 MINUTES: Performed by: STUDENT IN AN ORGANIZED HEALTH CARE EDUCATION/TRAINING PROGRAM

## 2025-05-27 PROCEDURE — 25000003 PHARM REV CODE 250

## 2025-05-27 PROCEDURE — 27200651 HC AIRWAY, LMA: Performed by: SURGERY

## 2025-05-27 RX ORDER — PROPOFOL 10 MG/ML
VIAL (ML) INTRAVENOUS
Status: DISCONTINUED | OUTPATIENT
Start: 2025-05-27 | End: 2025-05-27

## 2025-05-27 RX ORDER — OXYCODONE HYDROCHLORIDE 5 MG/1
5 TABLET ORAL EVERY 6 HOURS PRN
Qty: 5 TABLET | Refills: 0 | Status: SHIPPED | OUTPATIENT
Start: 2025-05-27

## 2025-05-27 RX ORDER — DEXTROMETHORPHAN HYDROBROMIDE, GUAIFENESIN 5; 100 MG/5ML; MG/5ML
650 LIQUID ORAL EVERY 8 HOURS
Qty: 20 TABLET | Refills: 0 | Status: SHIPPED | OUTPATIENT
Start: 2025-05-27 | End: 2025-06-03

## 2025-05-27 RX ORDER — DEXAMETHASONE SODIUM PHOSPHATE 4 MG/ML
INJECTION, SOLUTION INTRA-ARTICULAR; INTRALESIONAL; INTRAMUSCULAR; INTRAVENOUS; SOFT TISSUE
Status: DISCONTINUED | OUTPATIENT
Start: 2025-05-27 | End: 2025-05-27

## 2025-05-27 RX ORDER — IBUPROFEN 800 MG/1
800 TABLET, FILM COATED ORAL EVERY 8 HOURS
Qty: 20 TABLET | Refills: 0 | Status: SHIPPED | OUTPATIENT
Start: 2025-05-27

## 2025-05-27 RX ORDER — FENTANYL CITRATE 50 UG/ML
25 INJECTION, SOLUTION INTRAMUSCULAR; INTRAVENOUS EVERY 5 MIN PRN
Status: DISCONTINUED | OUTPATIENT
Start: 2025-05-27 | End: 2025-05-27 | Stop reason: HOSPADM

## 2025-05-27 RX ORDER — LIDOCAINE HYDROCHLORIDE 20 MG/ML
INJECTION INTRAVENOUS
Status: DISCONTINUED | OUTPATIENT
Start: 2025-05-27 | End: 2025-05-27

## 2025-05-27 RX ORDER — FENTANYL CITRATE 50 UG/ML
INJECTION, SOLUTION INTRAMUSCULAR; INTRAVENOUS
Status: DISCONTINUED | OUTPATIENT
Start: 2025-05-27 | End: 2025-05-27

## 2025-05-27 RX ORDER — CEFAZOLIN 2 G/1
2 INJECTION, POWDER, FOR SOLUTION INTRAMUSCULAR; INTRAVENOUS
Status: COMPLETED | OUTPATIENT
Start: 2025-05-27 | End: 2025-05-27

## 2025-05-27 RX ORDER — MIDAZOLAM HYDROCHLORIDE 1 MG/ML
INJECTION INTRAMUSCULAR; INTRAVENOUS
Status: DISCONTINUED | OUTPATIENT
Start: 2025-05-27 | End: 2025-05-27

## 2025-05-27 RX ORDER — HALOPERIDOL LACTATE 5 MG/ML
0.5 INJECTION, SOLUTION INTRAMUSCULAR EVERY 10 MIN PRN
Status: DISCONTINUED | OUTPATIENT
Start: 2025-05-27 | End: 2025-05-27 | Stop reason: HOSPADM

## 2025-05-27 RX ORDER — ONDANSETRON HYDROCHLORIDE 2 MG/ML
INJECTION, SOLUTION INTRAVENOUS
Status: DISCONTINUED | OUTPATIENT
Start: 2025-05-27 | End: 2025-05-27

## 2025-05-27 RX ORDER — OXYCODONE HYDROCHLORIDE 5 MG/1
5 TABLET ORAL ONCE
Refills: 0 | Status: COMPLETED | OUTPATIENT
Start: 2025-05-27 | End: 2025-05-27

## 2025-05-27 RX ORDER — ACETAMINOPHEN 10 MG/ML
INJECTION, SOLUTION INTRAVENOUS
Status: DISCONTINUED | OUTPATIENT
Start: 2025-05-27 | End: 2025-05-27

## 2025-05-27 RX ORDER — KETAMINE HCL IN 0.9 % NACL 50 MG/5 ML
SYRINGE (ML) INTRAVENOUS
Status: DISCONTINUED | OUTPATIENT
Start: 2025-05-27 | End: 2025-05-27

## 2025-05-27 RX ORDER — PROCHLORPERAZINE EDISYLATE 5 MG/ML
5 INJECTION INTRAMUSCULAR; INTRAVENOUS EVERY 30 MIN PRN
Status: DISCONTINUED | OUTPATIENT
Start: 2025-05-27 | End: 2025-05-27 | Stop reason: HOSPADM

## 2025-05-27 RX ORDER — DEXMEDETOMIDINE HYDROCHLORIDE 100 UG/ML
INJECTION, SOLUTION INTRAVENOUS
Status: DISCONTINUED | OUTPATIENT
Start: 2025-05-27 | End: 2025-05-27

## 2025-05-27 RX ADMIN — ACETAMINOPHEN 1000 MG: 10 INJECTION INTRAVENOUS at 03:05

## 2025-05-27 RX ADMIN — OXYCODONE 5 MG: 5 TABLET ORAL at 05:05

## 2025-05-27 RX ADMIN — DEXMEDETOMIDINE 12 MCG: 100 INJECTION, SOLUTION, CONCENTRATE INTRAVENOUS at 03:05

## 2025-05-27 RX ADMIN — SODIUM CHLORIDE: 0.9 INJECTION, SOLUTION INTRAVENOUS at 03:05

## 2025-05-27 RX ADMIN — CEFAZOLIN 2 G: 2 INJECTION, POWDER, FOR SOLUTION INTRAMUSCULAR; INTRAVENOUS at 03:05

## 2025-05-27 RX ADMIN — ONDANSETRON 4 MG: 2 INJECTION INTRAMUSCULAR; INTRAVENOUS at 03:05

## 2025-05-27 RX ADMIN — PROPOFOL 30 MG: 10 INJECTION, EMULSION INTRAVENOUS at 03:05

## 2025-05-27 RX ADMIN — FENTANYL CITRATE 100 MCG: 50 INJECTION, SOLUTION INTRAMUSCULAR; INTRAVENOUS at 03:05

## 2025-05-27 RX ADMIN — DEXAMETHASONE SODIUM PHOSPHATE 4 MG: 4 INJECTION, SOLUTION INTRAMUSCULAR; INTRAVENOUS at 03:05

## 2025-05-27 RX ADMIN — LIDOCAINE HYDROCHLORIDE 100 MG: 20 INJECTION INTRAVENOUS at 03:05

## 2025-05-27 RX ADMIN — PROPOFOL 200 MG: 10 INJECTION, EMULSION INTRAVENOUS at 03:05

## 2025-05-27 RX ADMIN — MIDAZOLAM HYDROCHLORIDE 2 MG: 2 INJECTION, SOLUTION INTRAMUSCULAR; INTRAVENOUS at 03:05

## 2025-05-27 RX ADMIN — Medication 20 MG: at 04:05

## 2025-05-27 NOTE — TRANSFER OF CARE
Anesthesia Transfer of Care Note    Patient: Chester Red Jr.    Procedure(s) Performed: Procedure(s) (LRB):  CIRCUMCISION (N/A)    Patient location: [facility]    Anesthesia Type: general    Transport from OR: Transported from OR on 6-10 L/min O2 by face mask with adequate spontaneous ventilation    Post pain: adequate analgesia    Post assessment: no apparent anesthetic complications and tolerated procedure well    Post vital signs: stable    Level of consciousness: responds to stimulation    Nausea/Vomiting: no nausea/vomiting    Complications: none    Transfer of care protocol was followed      Last vitals: Visit Vitals  /74   Pulse 76   Temp 36.7 °C (98.1 °F) (Temporal)   Resp 18   SpO2 100%

## 2025-05-27 NOTE — ANESTHESIA POSTPROCEDURE EVALUATION
Anesthesia Post Evaluation    Patient: Chester Red Jr.    Procedure(s) Performed: Procedure(s) (LRB):  CIRCUMCISION (N/A)    Final Anesthesia Type: general      Patient location during evaluation: [facility]  Patient participation: Yes- Able to Participate  Level of consciousness: awake and alert  Post-procedure vital signs: reviewed and stable  Pain management: adequate  Airway patency: patent    PONV status at discharge: No PONV  Anesthetic complications: no      Cardiovascular status: hemodynamically stable  Respiratory status: unassisted  Hydration status: euvolemic  Follow-up not needed.              Vitals Value Taken Time   /89 05/27/25 17:32   Temp  05/27/25 17:45   Pulse 77 05/27/25 17:44   Resp 17 05/27/25 17:23   SpO2 96 % 05/27/25 17:44   Vitals shown include unfiled device data.      No case tracking events are documented in the log.      Pain/Niru Score: Pain Rating Prior to Med Admin: 7 (5/27/2025  5:23 PM)  Niru Score: 7 (5/27/2025  4:53 PM)

## 2025-05-27 NOTE — BRIEF OP NOTE
[facility] - Surgery ([facility])  Brief Operative Note    Surgery Date: 5/27/2025     Surgeons and Role:     * Bill New MD - Primary     * Mathieu Dominguez MD - Resident - Assisting     * Neetu Yin MD        Pre-op Diagnosis:  Balanitis [N48.1]    Post-op Diagnosis:  Post-Op Diagnosis Codes:     * Balanitis [N48.1]    Procedure(s) (LRB):  CIRCUMCISION (N/A)    Anesthesia: General    Operative Findings:   - Circumcision performed in standard fashion    Estimated Blood Loss: * No values recorded between 5/27/2025  3:26 PM and 5/27/2025  4:54 PM *         Specimens:   Specimen (24h ago, onward)      None            * No specimens in log *        Discharge Note    OUTCOME: Patient tolerated treatment/procedure well without complication and is now ready for discharge.    DISPOSITION: Home or Self Care    FINAL DIAGNOSIS:  <principal problem not specified>    FOLLOWUP: In clinic    DISCHARGE INSTRUCTIONS:  No discharge procedures on file.

## 2025-05-27 NOTE — DISCHARGE INSTRUCTIONS
Post Penis Surgery Instructions  No strenuous activity or exercise for 1 week  Take ibuprofen and tylenol for pain, opioid medications only as needed if uncontrolled by over the counter medications  Do not strain to have a bowel movement - take miralax as needed  Avoid sex/masturbation for 6 weeks    You can expect:  Swelling in your penis, if there is significant swelling or pain this should be evaluated by an MD or ER  Swelling should resolve in the next few weeks    You can shower in 24 hours    You can place ice on your scrotum for 30 min to 1 hour at a time for swelling  You can also elevate your scrotum under towels to help with swelling when you are sitting    You can wear a jock strap or tight white underwear to help with swelling    Call the doctor if:  Temperature is greater than 101F  Persistent vomiting and inability to keep food down

## 2025-05-27 NOTE — DISCHARGE SUMMARY
[facility]  Discharge Note  Short Stay    Admit Date: 5/27/2025    Discharge Date and Time: 05/28/2025 4:18 PM      Attending Physician: No att. providers found     Discharge Provider: Neetu Yin    Diagnoses:  Past Medical History:   Diagnosis Date    Acid reflux     Gall stones     Hypertension        Discharged Condition: good    Hospital Course: Patient was admitted for circumcision and tolerated the procedure well with no complications. The patient was discharged home in good condition on the same day.       Final Diagnoses: Same as principal problem.    Disposition: Home or Self Care    Follow up/Patient Instructions:    Medications:  Reconciled Home Medications:   Discharge Medication List as of 5/27/2025  5:08 PM        START taking these medications    Details   acetaminophen (TYLENOL) 650 MG TbSR Take 1 tablet (650 mg total) by mouth every 8 (eight) hours. for 7 days, Starting Tue 5/27/2025, Until Tue 6/3/2025, Normal      ibuprofen (ADVIL,MOTRIN) 800 MG tablet Take 1 tablet (800 mg total) by mouth every 8 (eight) hours., Starting Tue 5/27/2025, Normal      oxyCODONE (ROXICODONE) 5 MG immediate release tablet Take 1 tablet (5 mg total) by mouth every 6 (six) hours as needed for Pain., Starting Tue 5/27/2025, Normal           CONTINUE these medications which have NOT CHANGED    Details   albuterol (VENTOLIN HFA) 90 mcg/actuation inhaler Inhale 2 puffs into the lungs every 6 (six) hours as needed for Wheezing. Rescue, Starting Mon 4/1/2024, Until Tue 4/1/2025 at 2359, Normal      clotrimazole (LOTRIMIN) 1 % cream Apply topically 2 (two) times daily., Starting Wed 4/30/2025, Until Fri 5/30/2025, Normal      famotidine (PEPCID) 40 MG tablet Take 1 tablet (40 mg total) by mouth nightly as needed for Heartburn., Starting Wed 2/19/2025, Until Sat 2/14/2026 at 2359, Normal      omeprazole (PRILOSEC) 40 MG capsule Take 1 capsule (40 mg total) by mouth every morning., Starting Wed 2/19/2025,  Normal           No discharge procedures on file.   Follow-up Information       Bill New MD Follow up on 6/11/2025.    Specialty: Urology  Why: Post-op, Follow up  Contact information:  [address]atti Regan david  [facility] [address] [phone]                             Discharge Procedure Orders (must include Diet, Follow-up, Activity):  No discharge procedures on file.

## 2025-05-27 NOTE — ANESTHESIA PROCEDURE NOTES
Intubation    Date/Time: 5/27/2025 3:35 PM    Performed by: Jessica Padilla CRNA  Authorized by: Avila Ngo MD    Intubation:     Induction:  Intravenous    Intubated:  Postinduction    Mask Ventilation:  Easy mask    Attempts:  1    Attempted By:  Resident anesthesiologist    Difficult Airway Encountered?: No      Complications:  None    Airway Device:  Supraglottic airway/LMA    Airway Device Size:  5.0    Style/Cuff Inflation:  Cuffed    Secured at:  The lips    Placement Verified By:  Capnometry    Complicating Factors:  None    Findings Post-Intubation:  BS equal bilateral and atraumatic/condition of teeth unchanged

## 2025-05-27 NOTE — ANESTHESIA PREPROCEDURE EVALUATION
[facility]  Anesthesia Pre-Operative Evaluation        Patient Name: Chester Red Jr.  YOB: 1995  MRN: [id number]    SUBJECTIVE:     Pre-operative Evaluation for Procedure(s) (LRB):  CIRCUMCISION (N/A)     05/27/2025    Chester Red Jr. is a 30 y.o. male with a PMHx significant for GERD, tobacco abuse, and obesity (BMI 37) who presents for the above procedure with Dr. New.     Previous Airway: None documented       TTE:  Results for orders placed during the hospital encounter of 04/02/25  Interpretation Summary    Left Ventricle: The left ventricle is normal in size. Mildly increased wall thickness. Unable to assess wall motion.  Grossly appears to be normal There is normal systolic function with a visually estimated ejection fraction of 55 - 60%. There is normal diastolic function.    Right Ventricle: Right ventricle was not well visualized due to poor acoustic window.  Grossly appears to be normal size and function    IVC/SVC: Normal venous pressure at 3 mmHg.    Problem List[1]    Review of patient's allergies indicates:  No Known Allergies    Current Outpatient Medications   Medication Instructions    albuterol (VENTOLIN HFA) 90 mcg/actuation inhaler 2 puffs, Inhalation, Every 6 hours PRN, Rescue    clotrimazole (LOTRIMIN) 1 % cream Topical (Top), 2 times daily    famotidine (PEPCID) 40 mg, Oral, Nightly PRN    omeprazole (PRILOSEC) 40 mg, Oral, Every morning       Past Surgical History:   Procedure Laterality Date    ACHILLES TENDON SURGERY      COLONOSCOPY N/A 3/22/2024    Procedure: COLONOSCOPY;  Surgeon: Sharad Garcia MD;  Location: [facility];  Service: Endoscopy;  Laterality: N/A;    ESOPHAGOGASTRODUODENOSCOPY N/A 08/01/2022    Procedure: EGD (ESOPHAGOGASTRODUODENOSCOPY);  Surgeon: Bubba Lu MD;  Location: [facility];  Service: Endoscopy;  Laterality: N/A;    UPPER GASTROINTESTINAL ENDOSCOPY         Social History     Substance and Sexual Activity    Drug Use No     Alcohol Use: Not At Risk (4/30/2025)    AUDIT-C     Frequency of Alcohol Consumption: Monthly or less     Average Number of Drinks: Patient does not drink     Frequency of Binge Drinking: Never     Tobacco Use: Medium Risk (3/25/2025)    [facility] [place] [facility]    Patient History     Smoking Tobacco Use: Never     Smokeless Tobacco Use: Former     Passive Exposure: Never       OBJECTIVE:     Vital Signs Range (Last 24H):         Significant Labs    Heme Profile  Lab Results   Component Value Date    WBC 4.90 02/19/2025    HGB 14.2 02/19/2025    HCT 43.3 02/19/2025     02/19/2025       Coagulation Studies  Lab Results   Component Value Date    LABPROT 11.5 07/12/2023    INR 0.9 07/12/2023    APTT 28.0 07/12/2023       BMP  Lab Results   Component Value Date     02/19/2025    K 4.1 02/19/2025     02/19/2025    CO2 23 02/19/2025    BUN 16 02/19/2025    CREATININE 1.2 02/19/2025    MG 2.4 02/19/2025       Liver Function Tests  Lab Results   Component Value Date    AST 23 02/19/2025    ALT 33 02/19/2025    ALKPHOS 64 02/19/2025    BILITOT 0.6 02/19/2025    PROT 8.0 02/19/2025    ALBUMIN 4.2 02/19/2025       Lipid Profile  Lab Results   Component Value Date    CHOL 154 04/17/2024    HDL 39 (L) 04/17/2024    TRIG 222 (H) 04/17/2024       Endocrine Profile  Lab Results   Component Value Date    HGBA1C 5.7 (H) 04/17/2024    TSH 1.580 11/17/2022         Cardiac Studies    EKG:   Results for orders placed or performed in visit on 04/09/24   EKG 12-lead    Collection Time: 04/09/24  2:20 PM   Result Value Ref Range    QRS Duration 92 ms    [facility] QTC Calculation 402 ms    Narrative    Test Reason : R07.89,    Vent. Rate : 060 BPM     Atrial Rate : 060 BPM     P-R Int : 176 ms          QRS Dur : 092 ms      QT Int : 402 ms       P-R-T Axes : 060 054 023 degrees     QTc Int : 402 ms    Normal sinus rhythm  Normal ECG  When compared with ECG of 17-NOV-2023 18:51,  Left posterior fascicular block  is no longer Present likely due to  correction of lead misplacements.  Confirmed by Enmanuel Villafana MD ([id number]) on 4/10/2024 1:45:24 PM    Referred By: Urban Zarco           Confirmed By:Enmanuel Villfaana MD       TTE:  Results for orders placed during the hospital encounter of 04/02/25  Interpretation Summary    Left Ventricle: The left ventricle is normal in size. Mildly increased wall thickness. Unable to assess wall motion.  Grossly appears to be normal There is normal systolic function with a visually estimated ejection fraction of 55 - 60%. There is normal diastolic function.    Right Ventricle: Right ventricle was not well visualized due to poor acoustic window.  Grossly appears to be normal size and function    IVC/SVC: Normal venous pressure at 3 mmHg.      ASSESSMENT/PLAN:         Pre-op Assessment    I have reviewed the Patient Summary Reports.     I have reviewed the Nursing Notes. I have reviewed the NPO Status.   I have reviewed the Medications.     Review of Systems  Social:  Smoker, Vaping       Hematology/Oncology:  Hematology Normal   Oncology Normal                                   EENT/Dental:  EENT/Dental Normal           Cardiovascular:  Cardiovascular Normal                                              Pulmonary:  Pulmonary Normal                       Renal/:  Renal/ Normal                 Hepatic/GI:     GERD                Musculoskeletal:  Musculoskeletal Normal                Neurological:  Neurology Normal                                      Endocrine:  Endocrine Normal          Obesity / BMI > 30  Dermatological:  Skin Normal    Psych:  Psychiatric Normal                    Physical Exam  General: Well nourished, Cooperative and Alert    Airway:  Mallampati: II   Mouth Opening: Normal  TM Distance: Normal  Tongue: Normal  Neck ROM: Normal ROM    Dental:  Intact        Anesthesia Plan  Type of Anesthesia, risks & benefits discussed:    Anesthesia Type: Gen Supraglottic Airway, Gen Natural  Airway  Intra-op Monitoring Plan: Standard ASA Monitors  Post Op Pain Control Plan: multimodal analgesia and IV/PO Opioids PRN  Induction:  IV  Informed Consent: Informed consent signed with the Patient and all parties understand the risks and agree with anesthesia plan.  All questions answered.   ASA Score: 2  Day of Surgery Review of History & Physical: H&P Update referred to the surgeon/provider.    Ready For Surgery From Anesthesia Perspective.     .           [1]   Patient Active Problem List  Diagnosis    Tobacco use    Gastroesophageal reflux disease with esophagitis without hemorrhage

## 2025-05-28 NOTE — OP NOTE
[facility]  Operative Note    Date: 05/27/2025    Pre-Op Diagnosis: phimosis    Patient Active Problem List    Diagnosis Date Noted    Gastroesophageal reflux disease with esophagitis without hemorrhage 04/03/2023    Tobacco use 06/22/2022      Post-Op Diagnosis: same    Procedure(s) Performed:   1.  Circumcision     Specimen(s): none    Staff Surgeon: Bill New MD     Assistant Surgeon: Neetu Yin MD (); Mathieu Dominguez MD    Anesthesia: General LMA anesthesia    Indications: Chester Red Jr. is a 30 y.o. male with phimosis who presents for a circumcision.      Findings:   - Circumcision performed in standard fashion     Estimated Blood Loss: min    Drains: none    Procedure in detail:  After risks, benefits and possible complications of circumcision were discussed, the patient elected to under go the procedure and informed consent was obtained.  All questions were answered in the pre-operative area. The patient was transferred to the operative suite and placed in supine position. SCDs were applied and working.  After adequate anesthesia the patient was prepped and draped in the usual sterile fashion. Time out was preformed, haley-procedural antibiotic were confirmed.     A marking pen was used to asa our incisions, at the coronal sulcus with the foreskin in the normal anatomic position and 1 cm proximal to the glans in the retracted position.  A 15 blade was used to sharply incise our marked lines.  The foreskin was reduced and removed with electrocautery by connecting the two incisions we just made. The free edges were then re-approximated in a interrupted fashion using a 4-0 Monocryl.     A sterile dressing was applied. The patient tolerated the procedure well and was transferred to the PACU in stable condition    Disposition: The patient will follow up with Dr. New in a few weeks.  He was given prescriptions for pain medicine and instructed to  avoid sex/masturbation x 6 weeks.      Neetu Yin MD

## 2025-05-29 ENCOUNTER — TELEPHONE (OUTPATIENT)
Dept: UROLOGY | Facility: CLINIC | Age: 30
End: 2025-05-29
Payer: COMMERCIAL

## 2025-05-29 NOTE — TELEPHONE ENCOUNTER
----- Message from Mion sent at 5/29/2025  9:25 AM CDT -----  Regarding: Consult/Advisory  Contact: [phone]  Consult/Advisory Name Of Caller:pt  Contact Preference:[phone] [email or web address] Nature of call:  pt wants to know if  work excuse  for procedure on 5/27 can be sent via portal or email. Please call to advise thank you.

## 2025-06-11 ENCOUNTER — OFFICE VISIT (OUTPATIENT)
Dept: UROLOGY | Facility: CLINIC | Age: 30
End: 2025-06-11
Payer: COMMERCIAL

## 2025-06-11 VITALS
SYSTOLIC BLOOD PRESSURE: 135 MMHG | DIASTOLIC BLOOD PRESSURE: 76 MMHG | HEART RATE: 76 BPM | BODY MASS INDEX: 38.92 KG/M2 | HEIGHT: 71 IN | WEIGHT: 278 LBS

## 2025-06-11 DIAGNOSIS — N47.8 REDUNDANT FORESKIN: Primary | ICD-10-CM

## 2025-06-11 PROCEDURE — 99999 PR PBB SHADOW E&M-EST. PATIENT-LVL III: CPT | Mod: PBBFAC,,, | Performed by: STUDENT IN AN ORGANIZED HEALTH CARE EDUCATION/TRAINING PROGRAM

## 2025-06-11 PROCEDURE — 99499 UNLISTED E&M SERVICE: CPT | Mod: S$GLB,,, | Performed by: STUDENT IN AN ORGANIZED HEALTH CARE EDUCATION/TRAINING PROGRAM

## 2025-06-24 NOTE — PROGRESS NOTES
Postop visit  It was great to see Chester today.   He reports he has been healing well and has no concerns.   On exam, his incision is well healed.     Plan:  He has done well s/p circumcision. RTC PRN.

## 2025-07-30 ENCOUNTER — HOSPITAL ENCOUNTER (OUTPATIENT)
Dept: RADIOLOGY | Facility: HOSPITAL | Age: 30
Discharge: HOME | End: 2025-07-30
Payer: COMMERCIAL

## 2025-07-30 ENCOUNTER — TELEPHONE (OUTPATIENT)
Dept: FAMILY MEDICINE | Facility: CLINIC | Age: 30
End: 2025-07-30

## 2025-07-30 ENCOUNTER — LAB VISIT (OUTPATIENT)
Dept: LAB | Facility: HOSPITAL | Age: 30
End: 2025-07-30
Payer: COMMERCIAL

## 2025-07-30 ENCOUNTER — OFFICE VISIT (OUTPATIENT)
Dept: FAMILY MEDICINE | Facility: CLINIC | Age: 30
End: 2025-07-30
Payer: COMMERCIAL

## 2025-07-30 VITALS
WEIGHT: 272.5 LBS | HEART RATE: 65 BPM | BODY MASS INDEX: 38.15 KG/M2 | SYSTOLIC BLOOD PRESSURE: 126 MMHG | OXYGEN SATURATION: 100 % | RESPIRATION RATE: 18 BRPM | HEIGHT: 71 IN | DIASTOLIC BLOOD PRESSURE: 84 MMHG

## 2025-07-30 DIAGNOSIS — R10.32 LEFT LOWER QUADRANT PAIN: ICD-10-CM

## 2025-07-30 DIAGNOSIS — R10.10 UPPER ABDOMINAL PAIN: ICD-10-CM

## 2025-07-30 DIAGNOSIS — R10.9 ABDOMINAL PAIN, UNSPECIFIED ABDOMINAL LOCATION: ICD-10-CM

## 2025-07-30 DIAGNOSIS — R06.83 LOUD SNORING: ICD-10-CM

## 2025-07-30 DIAGNOSIS — K21.9 GASTROESOPHAGEAL REFLUX DISEASE, UNSPECIFIED WHETHER ESOPHAGITIS PRESENT: ICD-10-CM

## 2025-07-30 DIAGNOSIS — R10.13 EPIGASTRIC PAIN: ICD-10-CM

## 2025-07-30 DIAGNOSIS — R14.2 BELCHING: Primary | ICD-10-CM

## 2025-07-30 DIAGNOSIS — E66.01 MORBID OBESITY: ICD-10-CM

## 2025-07-30 DIAGNOSIS — R10.10 PAIN OF UPPER ABDOMEN: ICD-10-CM

## 2025-07-30 DIAGNOSIS — R12 HEARTBURN: ICD-10-CM

## 2025-07-30 LAB
ABSOLUTE EOSINOPHIL (OHS): 0.17 K/UL
ABSOLUTE MONOCYTE (OHS): 0.51 K/UL (ref 0.3–1)
ABSOLUTE NEUTROPHIL COUNT (OHS): 3.82 K/UL (ref 1.8–7.7)
ALBUMIN SERPL BCP-MCNC: 4.3 G/DL (ref 3.5–5.2)
ALP SERPL-CCNC: 67 UNIT/L (ref 40–150)
ALT SERPL W/O P-5'-P-CCNC: 45 UNIT/L (ref 0–55)
AMYLASE SERPL-CCNC: 81 UNIT/L (ref 20–110)
ANION GAP (OHS): 11 MMOL/L (ref 8–16)
AST SERPL-CCNC: 33 UNIT/L (ref 0–50)
BASOPHILS # BLD AUTO: 0.05 K/UL
BASOPHILS NFR BLD AUTO: 0.7 %
BILIRUB SERPL-MCNC: 0.6 MG/DL (ref 0.1–1)
BUN SERPL-MCNC: 13 MG/DL (ref 6–20)
CALCIUM SERPL-MCNC: 9.4 MG/DL (ref 8.7–10.5)
CHLORIDE SERPL-SCNC: 104 MMOL/L (ref 95–110)
CO2 SERPL-SCNC: 24 MMOL/L (ref 23–29)
CREAT SERPL-MCNC: 1.2 MG/DL (ref 0.5–1.4)
ERYTHROCYTE [DISTWIDTH] IN BLOOD BY AUTOMATED COUNT: 14.1 % (ref 11.5–14.5)
GFR SERPLBLD CREATININE-BSD FMLA CKD-EPI: >60 ML/MIN/1.73/M2
GLUCOSE SERPL-MCNC: 101 MG/DL (ref 70–110)
HCT VFR BLD AUTO: 43.5 % (ref 40–54)
HGB BLD-MCNC: 13.9 GM/DL (ref 14–18)
IMM GRANULOCYTES # BLD AUTO: 0.02 K/UL (ref 0–0.04)
IMM GRANULOCYTES NFR BLD AUTO: 0.3 % (ref 0–0.5)
LIPASE SERPL-CCNC: 39 U/L (ref 4–60)
LYMPHOCYTES # BLD AUTO: 2.45 K/UL (ref 1–4.8)
MCH RBC QN AUTO: 26.9 PG (ref 27–31)
MCHC RBC AUTO-ENTMCNC: 32 G/DL (ref 32–36)
MCV RBC AUTO: 84 FL (ref 82–98)
NUCLEATED RBC (/100WBC) (OHS): 0 /100 WBC
PLATELET # BLD AUTO: 336 K/UL (ref 150–450)
PMV BLD AUTO: 10.4 FL (ref 9.2–12.9)
POTASSIUM SERPL-SCNC: 4.2 MMOL/L (ref 3.5–5.1)
PROT SERPL-MCNC: 7.9 GM/DL (ref 6–8.4)
RBC # BLD AUTO: 5.16 M/UL (ref 4.6–6.2)
RELATIVE EOSINOPHIL (OHS): 2.4 %
RELATIVE LYMPHOCYTE (OHS): 34.9 % (ref 18–48)
RELATIVE MONOCYTE (OHS): 7.3 % (ref 4–15)
RELATIVE NEUTROPHIL (OHS): 54.4 % (ref 38–73)
SODIUM SERPL-SCNC: 139 MMOL/L (ref 136–145)
WBC # BLD AUTO: 7.02 K/UL (ref 3.9–12.7)

## 2025-07-30 PROCEDURE — 85025 COMPLETE CBC W/AUTO DIFF WBC: CPT

## 2025-07-30 PROCEDURE — 1160F RVW MEDS BY RX/DR IN RCRD: CPT | Mod: CPTII,S$GLB,,

## 2025-07-30 PROCEDURE — 83690 ASSAY OF LIPASE: CPT

## 2025-07-30 PROCEDURE — 74177 CT ABD & PELVIS W/CONTRAST: CPT | Mod: 26,,, | Performed by: RADIOLOGY

## 2025-07-30 PROCEDURE — 99214 OFFICE O/P EST MOD 30 MIN: CPT | Mod: S$GLB,,,

## 2025-07-30 PROCEDURE — A9698 NON-RAD CONTRAST MATERIALNOC: HCPCS

## 2025-07-30 PROCEDURE — 74177 CT ABD & PELVIS W/CONTRAST: CPT | Mod: TC

## 2025-07-30 PROCEDURE — 3008F BODY MASS INDEX DOCD: CPT | Mod: CPTII,S$GLB,,

## 2025-07-30 PROCEDURE — 25500020 PHARM REV CODE 255

## 2025-07-30 PROCEDURE — 82150 ASSAY OF AMYLASE: CPT

## 2025-07-30 PROCEDURE — 80053 COMPREHEN METABOLIC PANEL: CPT

## 2025-07-30 PROCEDURE — 1159F MED LIST DOCD IN RCRD: CPT | Mod: CPTII,S$GLB,,

## 2025-07-30 PROCEDURE — 3074F SYST BP LT 130 MM HG: CPT | Mod: CPTII,S$GLB,,

## 2025-07-30 PROCEDURE — 3079F DIAST BP 80-89 MM HG: CPT | Mod: CPTII,S$GLB,,

## 2025-07-30 PROCEDURE — 99999 PR PBB SHADOW E&M-EST. PATIENT-LVL IV: CPT | Mod: PBBFAC,,,

## 2025-07-30 PROCEDURE — G2211 COMPLEX E/M VISIT ADD ON: HCPCS | Mod: S$GLB,,,

## 2025-07-30 PROCEDURE — 36415 COLL VENOUS BLD VENIPUNCTURE: CPT | Mod: PO

## 2025-07-30 RX ORDER — FAMOTIDINE 40 MG/1
40 TABLET, FILM COATED ORAL NIGHTLY PRN
Qty: 90 TABLET | Refills: 3 | Status: SHIPPED | OUTPATIENT
Start: 2025-07-30 | End: 2026-07-25

## 2025-07-30 RX ORDER — OMEPRAZOLE 40 MG/1
40 CAPSULE, DELAYED RELEASE ORAL
Qty: 90 CAPSULE | Refills: 3 | Status: SHIPPED | OUTPATIENT
Start: 2025-07-30

## 2025-07-30 RX ADMIN — IOHEXOL 100 ML: 350 INJECTION, SOLUTION INTRAVENOUS at 04:07

## 2025-07-30 RX ADMIN — BARIUM SULFATE 900 ML: 20 SUSPENSION ORAL at 04:07

## 2025-07-30 NOTE — TELEPHONE ENCOUNTER
Writer is remote.  Reached out to in office staff member MARC Holder for further assistance.  Was advised 12:30 pm is available.  Writer contacted patient and confirmed he can come in for 12:30 pm today.  This has been indicated in appointment notes for registration notification upon patient's arrival for check in.

## 2025-07-30 NOTE — TELEPHONE ENCOUNTER
Copied from CRM #[id number]. Topic: General Inquiry - Patient Advice  >> Jul 30, 2025  8:30 AM Nehal wrote:  Type: Needs Medical Advice  Who Called:  the patient     Best Call Back Number: [phone]  Additional Information: pt asking if he may come in earlier today if possible about 11, please call      Routing History     From To Priority   07/30/2025 08:32 AM Nehal ShaikhON STAFF Routine

## 2025-07-30 NOTE — PROGRESS NOTES
"Subjective:       Patient ID: Chester Red Jr. is a 30 y.o. male.    Chief Complaint: Abdominal Pain    Abdominal Pain        History of Present Illness    CHIEF COMPLAINT:  Patient presents today for stomach issues    GASTROINTESTINAL:  He reports abdominal pain for one week with severity of 7/10, described as throbbing with intermittent sharp jabbing sensations across multiple abdominal quadrants. He has experienced frequent belching and sensation of a ball in his abdomen with pressure and discomfort for the past two months. He underwent upper endoscopy and colonoscopy in 2022.    CURRENT MEDICATIONS:  He takes Famotidine and Omeprazole daily in the morning.    SLEEP:  He reports enlarged palate causing potential airway blockage with associated snoring. He experiences significant daytime fatigue and feels tired upon waking.    LABS:  Labs from February were within normal limits.      ROS:  General: positive fatigue  Respiratory: positive snoring  Gastrointestinal: positive abdominal pain, positive excessive belching, positive abdominal distention, positive bloating          Past Medical History:   Diagnosis Date    Acid reflux     Gall stones     Hypertension        Review of patient's allergies indicates:  No Known Allergies    Current Medications[1]    Review of Systems   Gastrointestinal:  Positive for abdominal pain.       Objective:      /84 (BP Location: Right arm, Patient Position: Sitting)   Pulse 65   Resp 18   Ht 5' 11" (1.803 m)   Wt 123.6 kg (272 lb 7.8 oz)   SpO2 100%   BMI 38.00 kg/m²   Physical Exam  Vitals reviewed.   Constitutional:       General: He is not in acute distress.     Appearance: Normal appearance. He is obese. He is not ill-appearing, toxic-appearing or diaphoretic.   HENT:      Head: Normocephalic.      Right Ear: External ear normal.      Left Ear: External ear normal.      Nose: Nose normal. No congestion or rhinorrhea.      Mouth/Throat:      Mouth: Mucous membranes " are moist.      Pharynx: Oropharynx is clear.   Eyes:      General: No scleral icterus.        Right eye: No discharge.         Left eye: No discharge.      Extraocular Movements: Extraocular movements intact.      Conjunctiva/sclera: Conjunctivae normal.   Cardiovascular:      Rate and Rhythm: Normal rate and regular rhythm.      Pulses: Normal pulses.      Heart sounds: Normal heart sounds. No murmur heard.     No friction rub. No gallop.   Pulmonary:      Effort: Pulmonary effort is normal. No respiratory distress.      Breath sounds: Normal breath sounds. No wheezing, rhonchi or rales.   Chest:      Chest wall: No tenderness.   Abdominal:      General: There is distension.      Tenderness: There is no abdominal tenderness.      Comments: Very frequent belching. Every few seconds    Musculoskeletal:         General: No swelling, tenderness or deformity. Normal range of motion.      Cervical back: Normal range of motion.      Right lower leg: No edema.      Left lower leg: No edema.   Skin:     General: Skin is warm and dry.      Capillary Refill: Capillary refill takes less than 2 seconds.      Coloration: Skin is not jaundiced.      Findings: No bruising, erythema, lesion or rash.   Neurological:      Mental Status: He is alert and oriented to person, place, and time.      Gait: Gait normal.   Psychiatric:         Mood and Affect: Mood normal.         Behavior: Behavior normal.         Thought Content: Thought content normal.         Judgment: Judgment normal.             Assessment:       1. Belching    2. Heartburn    3. Gastroesophageal reflux disease, unspecified whether esophagitis present    4. Pain of upper abdomen    5. Upper abdominal pain    6. Abdominal pain, unspecified abdominal location    7. Epigastric pain    8. Left lower quadrant pain    9. Loud snoring    10. Morbid obesity          Assessment & Plan    IMPRESSION:  - Assessed persistent abdominal pain and frequent belching.  - Considered  potential diagnoses including ulcer, bowel obstruction, or pancreatic inflammation.    PLAN SUMMARY:  - Upper endoscopy (EGD) scheduled to re-evaluate upper GI tract  - Home sleep study ordered to evaluate for obstructive sleep apnea  - CT Abdomen stat ordered for comprehensive abdominal evaluation  - Amylase, lipase, CMP, CBC ordered to assess pancreatic function and overall health status  - Increased Omeprazole to twice daily  - Continued Famotidine with refill provided  - Will consider referral to sleep specialist based on sleep study results    LEFT UPPER QUADRANT PAIN:  - Patient reports severe throbbing pain (7/10) in the left upper quadrant.  - Examination confirmed generalized pain in this region.  - Ordered CT Abdomen stat to evaluate abdominal organs and rule out serious conditions such as ulcer or bowel obstruction.  - Will perform Upper endoscopy (EGD) to re-evaluate upper GI tract due to ongoing symptoms.  - Ordered amylase, lipase, CMP, CBC to assess pancreatic function and overall health status.  - Increased Omeprazole to twice daily and continued Famotidine with refill provided.    LEFT LOWER QUADRANT PAIN:  - Patient reports severe throbbing pain (7/10) in the left lower quadrant.  - Examination confirmed generalized pain in this region.  - CT Abdomen stat ordered as part of comprehensive abdominal evaluation.    RIGHT UPPER QUADRANT PAIN:  - Patient reports severe throbbing pain (7/10) in the right upper quadrant.  - Examination confirmed generalized pain in this region.  - CT Abdomen stat ordered as part of comprehensive abdominal evaluation.  - Amylase, lipase, CMP, CBC ordered to assess pancreatic function and overall health status.    RIGHT LOWER QUADRANT PAIN:  - Patient reports severe throbbing pain (7/10) in the right lower quadrant.  - Examination confirmed generalized pain in this region.  - CT Abdomen stat ordered as part of comprehensive abdominal evaluation.    EPIGASTRIC SWELLING, MASS  OR LUMP:  - Patient reports feeling like a ball or pressure in the epigastric region.  - Examination revealed distension and bloating in this area.  - CT Abdomen stat ordered to evaluate epigastric swelling and potential causes.  - Upper endoscopy (EGD) scheduled to re-evaluate upper GI tract.  - Increased Omeprazole to twice daily and continued Famotidine with refill provided.    OBSTRUCTIVE SLEEP APNEA:  - Patient reports snoring at night and feeling tired upon waking.  - Examination revealed enlarged palate, potentially causing obstruction.  - Ordered home sleep study to evaluate for obstructive sleep apnea.  - Will consider referral to sleep specialist if study results are positive.    OTHER FATIGUE:  - Patient reports feeling tired upon waking.  - Ordered CMP and CBC to assess overall health status.  - Home sleep study ordered as noted above.  - Will consider referral to sleep specialist based on sleep study results.          Plan:       Belching  -     Case Request Endoscopy: EGD (ESOPHAGOGASTRODUODENOSCOPY)    Heartburn  -     omeprazole (PRILOSEC) 40 MG capsule; Take 1 capsule (40 mg total) by mouth 2 (two) times daily before meals.  Dispense: 90 capsule; Refill: 3  -     famotidine (PEPCID) 40 MG tablet; Take 1 tablet (40 mg total) by mouth nightly as needed for Heartburn.  Dispense: 90 tablet; Refill: 3    Gastroesophageal reflux disease, unspecified whether esophagitis present  -     omeprazole (PRILOSEC) 40 MG capsule; Take 1 capsule (40 mg total) by mouth 2 (two) times daily before meals.  Dispense: 90 capsule; Refill: 3    Pain of upper abdomen  -     omeprazole (PRILOSEC) 40 MG capsule; Take 1 capsule (40 mg total) by mouth 2 (two) times daily before meals.  Dispense: 90 capsule; Refill: 3    Upper abdominal pain  -     famotidine (PEPCID) 40 MG tablet; Take 1 tablet (40 mg total) by mouth nightly as needed for Heartburn.  Dispense: 90 tablet; Refill: 3    Abdominal pain, unspecified abdominal  location  -     Cancel: CT Abdomen Pelvis W Wo Contrast; Future; Expected date: 07/30/2025  -     Comprehensive Metabolic Panel; Future; Expected date: 07/30/2025  -     CBC Auto Differential; Future; Expected date: 07/30/2025  -     Amylase; Future; Expected date: 07/30/2025  -     Lipase; Future; Expected date: 07/30/2025  -     CT Abdomen Pelvis With IV Contrast Routine Oral Contrast; Future; Expected date: 07/30/2025    Epigastric pain  -     Cancel: CT Abdomen Pelvis W Wo Contrast; Future; Expected date: 07/30/2025  -     Comprehensive Metabolic Panel; Future; Expected date: 07/30/2025  -     CBC Auto Differential; Future; Expected date: 07/30/2025  -     Amylase; Future; Expected date: 07/30/2025  -     Lipase; Future; Expected date: 07/30/2025  -     CT Abdomen Pelvis With IV Contrast Routine Oral Contrast; Future; Expected date: 07/30/2025    Left lower quadrant pain  -     Cancel: CT Abdomen Pelvis W Wo Contrast; Future; Expected date: 07/30/2025  -     Comprehensive Metabolic Panel; Future; Expected date: 07/30/2025  -     CBC Auto Differential; Future; Expected date: 07/30/2025  -     Amylase; Future; Expected date: 07/30/2025  -     Lipase; Future; Expected date: 07/30/2025  -     CT Abdomen Pelvis With IV Contrast Routine Oral Contrast; Future; Expected date: 07/30/2025    Loud snoring  -     Home Sleep Study; Future    Morbid obesity                   Darío Rios PA-C  Family Medicine Physician Assistant              I spent a total of 10 minutes on the day of the visit.This includes face to face time and non-face to face time preparing to see the patient (eg, review of tests), obtaining and/or reviewing separately obtained history, documenting clinical information in the electronic or other health record, independently interpreting results and communicating results to the patient/family/caregiver, or care coordinator.      We have addressed [4] Moderate: 1 or more chronic illnesses with  exacerbation, progression, or side effects of treatment / 2 or more stable chronic illnesses / 1 undiagnosed new problem with uncertain prognosis / 1 acute illness with systemic symptoms / 1 acute complicated injury  The complexity of the data reviewed and analyzed for this visit was [3] Limited (Reviewed prior external note, ordered unique testing or reviewed the results of each unique test)   The risk of complications and/or morbidity or mortality are [4] Moderate risk (I.e. prescription drug management / decision regarding minor surgery with identified pt or procedure risk factors / decision regarding elective major surgery without identified pt or procedure risk factors / diagnosis or treatment significantly limited by social determinants of health)   The level of Medical Decision Making for this visit is [4] Moderate     This note may have been generated with the assistance of ambient listening technology. If used, verbal consent was obtained by the patient and accompanying visitor(s) for the recording of patient appointment to facilitate this note. I attest to having reviewed and edited the generated note for accuracy, though some syntax or spelling errors may persist. Please contact the author of this note for any clarification.         [1]   Current Outpatient Medications:     albuterol (VENTOLIN HFA) 90 mcg/actuation inhaler, Inhale 2 puffs into the lungs every 6 (six) hours as needed for Wheezing. Rescue (Patient not taking: Reported on 7/30/2025), Disp: 18 g, Rfl: 0    clotrimazole (LOTRIMIN) 1 % cream, Apply topically 2 (two) times daily. (Patient not taking: Reported on 7/30/2025), Disp: 60 g, Rfl: 0    famotidine (PEPCID) 40 MG tablet, Take 1 tablet (40 mg total) by mouth nightly as needed for Heartburn., Disp: 90 tablet, Rfl: 3    ibuprofen (ADVIL,MOTRIN) 800 MG tablet, Take 1 tablet (800 mg total) by mouth every 8 (eight) hours. (Patient not taking: Reported on 7/30/2025), Disp: 20 tablet, Rfl: 0     omeprazole (PRILOSEC) 40 MG capsule, Take 1 capsule (40 mg total) by mouth 2 (two) times daily before meals., Disp: 90 capsule, Rfl: 3    oxyCODONE (ROXICODONE) 5 MG immediate release tablet, Take 1 tablet (5 mg total) by mouth every 6 (six) hours as needed for Pain. (Patient not taking: Reported on 7/30/2025), Disp: 5 tablet, Rfl: 0

## 2025-07-31 ENCOUNTER — TELEPHONE (OUTPATIENT)
Dept: GASTROENTEROLOGY | Facility: CLINIC | Age: 30
End: 2025-07-31
Payer: COMMERCIAL

## 2025-07-31 NOTE — PROGRESS NOTES
Subjective:       Patient ID: Chester Red Jr. is a 30 y.o. male Body mass index is 38.53 kg/m².    Chief Complaint: Abdominal Pain, Gastroesophageal Reflux, Gas (Belching ), Dysphagia, and Nausea    Referring Provider: No ref. provider found for heart burn/chest pain.  Established patient of Dr. Garcia and myself.     Pt reports continued pain in abdomen and chest that comes out of no where. Last EGD in 2022 with Dr BALDWIN.       Review of Systems   Constitutional:  Positive for activity change. Negative for appetite change, fatigue, fever and unexpected weight change.   HENT:  Negative for sore throat and trouble swallowing.    Respiratory:  Negative for cough and shortness of breath.    Cardiovascular:  Positive for chest pain.   Gastrointestinal:  Positive for abdominal pain. Negative for abdominal distention, anal bleeding, blood in stool, constipation, diarrhea, nausea, rectal pain and vomiting.       No LMP for male patient.  Past Medical History:   Diagnosis Date    Acid reflux     Gall stones     Hypertension      Past Surgical History:   Procedure Laterality Date    ACHILLES TENDON SURGERY      CIRCUMCISION N/A 5/27/2025    Procedure: CIRCUMCISION;  Surgeon: Bill New MD;  Location: [address];  Service: Urology;  Laterality: N/A;    COLONOSCOPY N/A 3/22/2024    Procedure: COLONOSCOPY;  Surgeon: Sharad Garcia MD;  Location: [facility];  Service: Endoscopy;  Laterality: N/A;    ESOPHAGOGASTRODUODENOSCOPY N/A 08/01/2022    Procedure: EGD (ESOPHAGOGASTRODUODENOSCOPY);  Surgeon: Bubba Lu MD;  Location: [facility];  Service: Endoscopy;  Laterality: N/A;    UPPER GASTROINTESTINAL ENDOSCOPY       Family History   Problem Relation Name Age of Onset    Prostate cancer Paternal Grandfather      Colon cancer Neg Hx      Colon polyps Neg Hx      Esophageal cancer Neg Hx      Stomach cancer Neg Hx       Social History[1]  Wt Readings from Last 10 Encounters:   08/06/25 125.3 kg (276 lb 3.8  oz)   07/30/25 123.6 kg (272 lb 7.8 oz)   06/11/25 126.1 kg (278 lb)   04/30/25 122 kg (268 lb 15.4 oz)   04/02/25 122 kg (269 lb)   02/19/25 120.6 kg (265 lb 14 oz)   06/12/24 114.7 kg (252 lb 13.9 oz)   04/17/24 112.5 kg (248 lb 0.3 oz)   04/09/24 112.4 kg (247 lb 12.8 oz)   03/22/24 111.1 kg (245 lb)     Lab Results   Component Value Date    WBC 7.02 07/30/2025    HGB 13.9 (L) 07/30/2025    HCT 43.5 07/30/2025    MCV 84 07/30/2025     07/30/2025     CMP  Sodium   Date Value Ref Range Status   07/30/2025 139 136 - 145 mmol/L Final   02/19/2025 140 136 - 145 mmol/L Final     Potassium   Date Value Ref Range Status   07/30/2025 4.2 3.5 - 5.1 mmol/L Final   02/19/2025 4.1 3.5 - 5.1 mmol/L Final     Chloride   Date Value Ref Range Status   07/30/2025 104 95 - 110 mmol/L Final   02/19/2025 105 95 - 110 mmol/L Final     CO2   Date Value Ref Range Status   07/30/2025 24 23 - 29 mmol/L Final   02/19/2025 23 23 - 29 mmol/L Final     Glucose   Date Value Ref Range Status   07/30/2025 101 70 - 110 mg/dL Final   02/19/2025 96 70 - 110 mg/dL Final     BUN   Date Value Ref Range Status   07/30/2025 13 6 - 20 mg/dL Final     Creatinine   Date Value Ref Range Status   07/30/2025 1.2 0.5 - 1.4 mg/dL Final     Calcium   Date Value Ref Range Status   07/30/2025 9.4 8.7 - 10.5 mg/dL Final   02/19/2025 9.4 8.7 - 10.5 mg/dL Final     Protein Total   Date Value Ref Range Status   07/30/2025 7.9 6.0 - 8.4 gm/dL Final     Total Protein   Date Value Ref Range Status   02/19/2025 8.0 6.0 - 8.4 g/dL Final     Albumin   Date Value Ref Range Status   07/30/2025 4.3 3.5 - 5.2 g/dL Final   02/19/2025 4.2 3.5 - 5.2 g/dL Final     Total Bilirubin   Date Value Ref Range Status   02/19/2025 0.6 0.1 - 1.0 mg/dL Final     Comment:     For infants and newborns, interpretation of results should be based  on gestational age, weight and in agreement with clinical  observations.    Premature Infant recommended reference ranges:  Up to 24  "hours.............<8.0 mg/dL  Up to 48 hours............<12.0 mg/dL  3-5 days..................<15.0 mg/dL  6-29 days.................<15.0 mg/dL       Bilirubin Total   Date Value Ref Range Status   07/30/2025 0.6 0.1 - 1.0 mg/dL Final     Comment:     For infants and newborns, interpretation of results should be based   on gestational age, weight and in agreement with clinical   observations.    Premature Infant recommended reference ranges:   0-24 hours:  <8.0 mg/dL   24-48 hours: <12.0 mg/dL   3-5 days:    <15.0 mg/dL   6-29 days:   <15.0 mg/dL     Alkaline Phosphatase   Date Value Ref Range Status   02/19/2025 64 40 - 150 U/L Final     ALP   Date Value Ref Range Status   07/30/2025 67 40 - 150 unit/L Final     AST   Date Value Ref Range Status   07/30/2025 33 0 - 50 unit/L Final     Comment:       An activated reagent was used, which may result in slightly higher values compared to standard method.   02/19/2025 23 10 - 40 U/L Final     ALT   Date Value Ref Range Status   07/30/2025 45 0 - 55 unit/L Final     Comment:       An activated reagent was used, which may result in slightly higher values compared to standard method.   02/19/2025 33 10 - 44 U/L Final     Anion Gap   Date Value Ref Range Status   07/30/2025 11 8 - 16 mmol/L Final     eGFR if    Date Value Ref Range Status   03/25/2022 >60.0 >60 mL/min/1.73 m^2 Final     eGFR if non    Date Value Ref Range Status   03/25/2022 >60.0 >60 mL/min/1.73 m^2 Final     Comment:     Calculation used to obtain the estimated glomerular filtration  rate (eGFR) is the CKD-EPI equation.        Lab Results   Component Value Date    AMYLASE 81 07/30/2025     Lab Results   Component Value Date    LIPASE 39 07/30/2025     No results found for: "LIPASERES"  Lab Results   Component Value Date    TSH 1.580 11/17/2022       Reviewed prior medical records including radiology report of OV 2/2025 & endoscopy history (see surgical " history).    Objective:      Physical Exam  Vitals and nursing note reviewed.   Constitutional:       General: He is not in acute distress.     Appearance: He is obese. He is not ill-appearing.   HENT:      Head: Normocephalic and atraumatic.      Mouth/Throat:      Mouth: Mucous membranes are moist.      Pharynx: Oropharynx is clear.   Eyes:      Conjunctiva/sclera: Conjunctivae normal.   Pulmonary:      Effort: Pulmonary effort is normal. No respiratory distress.   Abdominal:      General: Abdomen is flat. Bowel sounds are normal. There is no distension.      Palpations: Abdomen is soft.      Tenderness: There is no abdominal tenderness.   Skin:     General: Skin is warm and dry.      Capillary Refill: Capillary refill takes 2 to 3 seconds.   Neurological:      Mental Status: He is alert and oriented to person, place, and time.         Assessment:       1. Heartburn    2. Gastroesophageal reflux disease, unspecified whether esophagitis present    3. Pain of upper abdomen        Plan:       Gastroesophageal reflux disease, unspecified whether esophagitis present, Pain of upper abdomen, & Heartburn  -discussed about the different types of medications used to treat reflux and how to use them, antacids can be used PRN for breakthrough heartburn symptoms by reducing stomach acid that is already produced, H2 blockers work by limiting the amount acid production, & PPI's work to block acid production and are taken daily, patient verbalized understanding.  -Educated patient on lifestyle modifications to help control/reduce reflux/abdominal pain including: avoid large meals, avoid eating within 2-3 hours of bedtime (avoid late night eating & lying down soon after eating), elevate head of bed if nocturnal symptoms are present, smoking cessation (if current smoker), & weight loss (if overweight).   -Educated to avoid known foods which trigger reflux symptoms & to minimize/avoid high-fat foods, chocolate, caffeine, citrus,  alcohol, & tomato products.  -Advised to avoid/limit use of NSAID's, since they can cause GI upset, bleeding, and/or ulcers. If needed, take with food.     Continue pepcid nightly and omeprazole BID  Trial of carafate   - schedule EGD, discussed procedure with patient, including risks and benefits, patient verbalized understanding    -     sucralfate (CARAFATE) 1 gram tablet; Take 1 tablet (1 g total) by mouth 4 (four) times daily before meals and nightly. for 10 days  Dispense: 40 tablet; Refill: 0  -     Case Request Endoscopy: EGD (ESOPHAGOGASTRODUODENOSCOPY)      Follow up if symptoms worsen or fail to improve.      If no improvement in symptoms or symptoms worsen, call/follow-up at clinic or go to ER.       KARLEE Tellez, BRYN    Encounter includes face to face time and non-face to face time preparing to see the patient (eg, review of tests), obtaining and/or reviewing separately obtained history, documenting clinical information in the electronic or other health record, independently interpreting results (not separately reported) and communicating results to the patient/family/caregiver, or care coordination (not separately reported).     A dictation software program was used for this note. Please expect some simple typographical errors in this note.         [1]   Social History  Tobacco Use    Smoking status: Every Day     Types: Vaping with nicotine    Smokeless tobacco: Never    Tobacco comments:     Cigar   Substance Use Topics    Alcohol use: Not Currently     Comment: stopped a few months ago    Drug use: No

## 2025-08-06 ENCOUNTER — OFFICE VISIT (OUTPATIENT)
Dept: GASTROENTEROLOGY | Facility: CLINIC | Age: 30
End: 2025-08-06
Payer: COMMERCIAL

## 2025-08-06 VITALS — WEIGHT: 276.25 LBS | BODY MASS INDEX: 38.67 KG/M2 | HEIGHT: 71 IN

## 2025-08-06 DIAGNOSIS — R12 HEARTBURN: Primary | ICD-10-CM

## 2025-08-06 DIAGNOSIS — R10.10 PAIN OF UPPER ABDOMEN: ICD-10-CM

## 2025-08-06 DIAGNOSIS — K21.9 GASTROESOPHAGEAL REFLUX DISEASE, UNSPECIFIED WHETHER ESOPHAGITIS PRESENT: ICD-10-CM

## 2025-08-06 PROCEDURE — 99214 OFFICE O/P EST MOD 30 MIN: CPT | Mod: S$GLB,,,

## 2025-08-06 PROCEDURE — 99999 PR PBB SHADOW E&M-EST. PATIENT-LVL III: CPT | Mod: PBBFAC,,,

## 2025-08-06 PROCEDURE — 3008F BODY MASS INDEX DOCD: CPT | Mod: CPTII,S$GLB,,

## 2025-08-06 PROCEDURE — 1159F MED LIST DOCD IN RCRD: CPT | Mod: CPTII,S$GLB,,

## 2025-08-06 RX ORDER — SUCRALFATE 1 G/1
1 TABLET ORAL
Qty: 40 TABLET | Refills: 0 | Status: SHIPPED | OUTPATIENT
Start: 2025-08-06 | End: 2025-08-16

## 2025-08-06 NOTE — PATIENT INSTRUCTIONS
Recommend daily exercise as tolerated, adequate water intake (six 8-oz glasses of water daily), and high fiber diet. OTC fiber supplements are recommended if diet does not reach daily fiber goal (20-30 grams daily), such as Metamucil, Citrucel, or FiberCon (take as directed, separate from other oral medications by >2 hours).  -Recommend taking an OTC stool softener such as Colace as directed to avoid hard stools and straining with bowel movements PRN  -Recommend trying OTC MiraLax once daily (17g PO) as directed  - If no improvement with above recommendations, try intermittently dosed Dulcolax OTC as directed (every 3-4 days) PRN to facilitate bowel movements  -If still no improvement with these measures, call/follow-up    -discussed about the different types of medications used to treat reflux and how to use them, antacids can be used PRN for breakthrough heartburn symptoms by reducing stomach acid that is already produced, H2 blockers work by limiting the amount acid production, & PPI's work to block acid production and are taken daily, patient verbalized understanding.  -Educated patient on lifestyle modifications to help control/reduce reflux/abdominal pain including: avoid large meals, avoid eating within 2-3 hours of bedtime (avoid late night eating & lying down soon after eating), elevate head of bed if nocturnal symptoms are present, smoking cessation (if current smoker), & weight loss (if overweight).   -Educated to avoid known foods which trigger reflux symptoms & to minimize/avoid high-fat foods, chocolate, caffeine, citrus, alcohol, & tomato products.  -Advised to avoid/limit use of NSAID's, since they can cause GI upset, bleeding, and/or ulcers. If needed, take with food.

## 2025-08-18 ENCOUNTER — HOSPITAL ENCOUNTER (EMERGENCY)
Facility: HOSPITAL | Age: 30
Discharge: HOME | End: 2025-08-18
Attending: EMERGENCY MEDICINE
Payer: COMMERCIAL

## 2025-08-18 VITALS
SYSTOLIC BLOOD PRESSURE: 130 MMHG | DIASTOLIC BLOOD PRESSURE: 88 MMHG | OXYGEN SATURATION: 98 % | BODY MASS INDEX: 38.64 KG/M2 | TEMPERATURE: 99 F | HEIGHT: 71 IN | WEIGHT: 276 LBS | HEART RATE: 61 BPM | RESPIRATION RATE: 18 BRPM

## 2025-08-18 DIAGNOSIS — U07.1 COVID-19 VIRUS DETECTED: ICD-10-CM

## 2025-08-18 DIAGNOSIS — U07.1 COVID: Primary | ICD-10-CM

## 2025-08-18 LAB
FLUAV AG UPPER RESP QL IA.RAPID: NEGATIVE
FLUBV AG UPPER RESP QL IA.RAPID: NEGATIVE
GROUP A STREP MOLECULAR (OHS): NEGATIVE
SARS-COV-2 RDRP RESP QL NAA+PROBE: POSITIVE

## 2025-08-18 PROCEDURE — 87651 STREP A DNA AMP PROBE: CPT | Performed by: EMERGENCY MEDICINE

## 2025-08-18 PROCEDURE — 87502 INFLUENZA DNA AMP PROBE: CPT | Performed by: EMERGENCY MEDICINE

## 2025-08-18 PROCEDURE — 25000003 PHARM REV CODE 250: Performed by: EMERGENCY MEDICINE

## 2025-08-18 PROCEDURE — 99284 EMERGENCY DEPT VISIT MOD MDM: CPT | Mod: 25

## 2025-08-18 PROCEDURE — U0002 COVID-19 LAB TEST NON-CDC: HCPCS | Performed by: EMERGENCY MEDICINE

## 2025-08-18 RX ORDER — IBUPROFEN 600 MG/1
600 TABLET, FILM COATED ORAL
Status: COMPLETED | OUTPATIENT
Start: 2025-08-18 | End: 2025-08-18

## 2025-08-18 RX ADMIN — IBUPROFEN 600 MG: 600 TABLET ORAL at 01:08

## 2025-08-29 ENCOUNTER — ANESTHESIA EVENT (OUTPATIENT)
Dept: ENDOSCOPY | Facility: HOSPITAL | Age: 30
End: 2025-08-29
Payer: COMMERCIAL

## 2025-08-29 ENCOUNTER — HOSPITAL ENCOUNTER (OUTPATIENT)
Facility: HOSPITAL | Age: 30
Discharge: HOME | End: 2025-08-29
Attending: INTERNAL MEDICINE | Admitting: INTERNAL MEDICINE
Payer: COMMERCIAL

## 2025-08-29 ENCOUNTER — ANESTHESIA (OUTPATIENT)
Dept: ENDOSCOPY | Facility: HOSPITAL | Age: 30
End: 2025-08-29
Payer: COMMERCIAL

## 2025-08-29 VITALS
DIASTOLIC BLOOD PRESSURE: 86 MMHG | SYSTOLIC BLOOD PRESSURE: 134 MMHG | OXYGEN SATURATION: 98 % | TEMPERATURE: 98 F | HEART RATE: 80 BPM | RESPIRATION RATE: 16 BRPM

## 2025-08-29 DIAGNOSIS — K44.9 HIATAL HERNIA: ICD-10-CM

## 2025-08-29 DIAGNOSIS — K29.70 GASTRITIS, PRESENCE OF BLEEDING UNSPECIFIED, UNSPECIFIED CHRONICITY, UNSPECIFIED GASTRITIS TYPE: ICD-10-CM

## 2025-08-29 DIAGNOSIS — R12 HEARTBURN: ICD-10-CM

## 2025-08-29 DIAGNOSIS — K22.2 SCHATZKI RING OF DISTAL ESOPHAGUS: Primary | ICD-10-CM

## 2025-08-29 PROCEDURE — 37000008 HC ANESTHESIA 1ST 15 MINUTES: Performed by: INTERNAL MEDICINE

## 2025-08-29 PROCEDURE — 43239 EGD BIOPSY SINGLE/MULTIPLE: CPT | Mod: XS | Performed by: INTERNAL MEDICINE

## 2025-08-29 PROCEDURE — 43248 EGD GUIDE WIRE INSERTION: CPT | Performed by: INTERNAL MEDICINE

## 2025-08-29 PROCEDURE — 43239 EGD BIOPSY SINGLE/MULTIPLE: CPT | Mod: XS,,, | Performed by: INTERNAL MEDICINE

## 2025-08-29 PROCEDURE — C1769 GUIDE WIRE: HCPCS | Performed by: INTERNAL MEDICINE

## 2025-08-29 PROCEDURE — 27201012 HC FORCEPS, HOT/COLD, DISP: Performed by: INTERNAL MEDICINE

## 2025-08-29 PROCEDURE — 63600175 PHARM REV CODE 636 W HCPCS: Performed by: NURSE ANESTHETIST, CERTIFIED REGISTERED

## 2025-08-29 PROCEDURE — 43248 EGD GUIDE WIRE INSERTION: CPT | Mod: ,,, | Performed by: INTERNAL MEDICINE

## 2025-08-29 RX ORDER — LIDOCAINE HYDROCHLORIDE 20 MG/ML
INJECTION INTRAVENOUS
Status: DISCONTINUED | OUTPATIENT
Start: 2025-08-29 | End: 2025-08-29

## 2025-08-29 RX ORDER — SODIUM CHLORIDE 9 MG/ML
INJECTION, SOLUTION INTRAVENOUS CONTINUOUS
Status: DISCONTINUED | OUTPATIENT
Start: 2025-08-29 | End: 2025-08-29 | Stop reason: HOSPADM

## 2025-08-29 RX ORDER — PROPOFOL 10 MG/ML
VIAL (ML) INTRAVENOUS
Status: DISCONTINUED | OUTPATIENT
Start: 2025-08-29 | End: 2025-08-29

## 2025-08-29 RX ADMIN — PROPOFOL 50 MG: 10 INJECTION, EMULSION INTRAVENOUS at 08:08

## 2025-08-29 RX ADMIN — LIDOCAINE HYDROCHLORIDE 100 MG: 20 INJECTION, SOLUTION INTRAVENOUS at 08:08

## 2025-08-29 RX ADMIN — PROPOFOL 150 MG: 10 INJECTION, EMULSION INTRAVENOUS at 08:08

## (undated) DEVICE — SUT CHROMIC 3-0 SH 27IN GUT

## (undated) DEVICE — DRESSING XEROFORM NONADH 1X8IN

## (undated) DEVICE — GOWN SURGICAL X-LARGE

## (undated) DEVICE — ELECTRODE REM PLYHSV RETURN 9

## (undated) DEVICE — DRAPE LAP T SHT W/ INSTR PAD

## (undated) DEVICE — SYR 10CC LUER LOCK

## (undated) DEVICE — PENCIL ROCKER SWITCH 10FT CORD

## (undated) DEVICE — TRAY MINOR GEN SURG OMC

## (undated) DEVICE — BNDG COFLEX FOAM LF2 ST 4X5YD